# Patient Record
Sex: MALE | Race: WHITE | NOT HISPANIC OR LATINO | Employment: FULL TIME | URBAN - METROPOLITAN AREA
[De-identification: names, ages, dates, MRNs, and addresses within clinical notes are randomized per-mention and may not be internally consistent; named-entity substitution may affect disease eponyms.]

---

## 2019-08-13 ENCOUNTER — OFFICE VISIT (OUTPATIENT)
Dept: FAMILY MEDICINE CLINIC | Facility: CLINIC | Age: 64
End: 2019-08-13

## 2019-08-13 VITALS
HEART RATE: 105 BPM | SYSTOLIC BLOOD PRESSURE: 183 MMHG | WEIGHT: 205.4 LBS | RESPIRATION RATE: 12 BRPM | DIASTOLIC BLOOD PRESSURE: 128 MMHG

## 2019-08-13 DIAGNOSIS — R06.01 ORTHOPNEA: ICD-10-CM

## 2019-08-13 DIAGNOSIS — Z00.00 PHYSICAL EXAM: Primary | ICD-10-CM

## 2019-08-13 DIAGNOSIS — Z12.11 COLON CANCER SCREENING: ICD-10-CM

## 2019-08-13 PROBLEM — I10 ESSENTIAL HYPERTENSION: Status: ACTIVE | Noted: 2019-08-13

## 2019-08-13 PROCEDURE — 99396 PREV VISIT EST AGE 40-64: CPT | Performed by: FAMILY MEDICINE

## 2019-08-13 RX ORDER — LISINOPRIL 10 MG/1
10 TABLET ORAL DAILY
Qty: 30 TABLET | Refills: 5 | Status: SHIPPED | OUTPATIENT
Start: 2019-08-13 | End: 2019-08-27

## 2019-08-13 NOTE — PATIENT INSTRUCTIONS

## 2019-08-13 NOTE — PROGRESS NOTES
Rooming Tab(CC,VS,Pt Hx,Fall Screen)  Chief Complaint   Patient presents with   • Annual Exam     The patient is here for a new patient apt.       Subjective 64-year-old male here for a physical.  He is a new patient.  Patient does not exercise.  Patient denies any chest pain or dyspnea with exertion.  He has no dizziness or syncope.  The patient does complain of orthopnea and PND.  This is been going on for a couple of weeks.  He has no cough but does feel somewhat wheezy at times.  This only happens at night when he lays down.  He has no fever or chills.  He has no runny nose or congested nose and no history of lung disease and does not smoke.  He has no shortness of breath with walking and no history of congestive heart failure.  He does have untreated hypertension and is had this for years.  He denies any issues with bowel or bladder function.  He has no blood in his stool or blood in his urine.  He tries to eat right.    I have reviewed and updated his medications, medical history and problem list during today's office visit.     Patient Care Team:  Zev Li MD as PCP - General (Family Medicine)    Problem List Tab  Medications Tab  Synopsis Tab  Chart Review Tab  Care Everywhere Tab  Immunizations Tab  Patient History Tab    Social History     Tobacco Use   • Smoking status: Never Smoker   • Smokeless tobacco: Never Used   Substance Use Topics   • Alcohol use: No     Frequency: Never     ECG 12 Lead  Date/Time: 8/13/2019 2:30 PM  Performed by: Zev Li MD  Authorized by: Zev Li MD   Comparison: not compared with previous ECG   Previous ECG: no previous ECG available  Rhythm: sinus rhythm  Rate: normal  Conduction: left bundle branch block  QRS axis: left    Clinical impression: abnormal EKG  Comments: Will jahaira echo              Review of Systems   Constitutional: Negative for chills, diaphoresis, fatigue and fever.   HENT: Negative for nosebleeds.    Respiratory: Negative for chest  tightness and shortness of breath.         Orthopnea   Cardiovascular: Negative for chest pain and palpitations.   Gastrointestinal: Negative for abdominal pain and blood in stool.   Genitourinary: Negative for dysuria and hematuria.   Musculoskeletal: Negative for arthralgias.   Neurological: Negative for dizziness and syncope.   Psychiatric/Behavioral: Negative for depressed mood. The patient is not nervous/anxious.        Objective     Rooming Tab(CC,VS,Pt Hx,Fall Screen)  BP (!) 183/128 (BP Location: Left arm, Patient Position: Sitting, Cuff Size: Large Adult)   Pulse 105   Resp 12   Wt 93.2 kg (205 lb 6.4 oz)     There is no height or weight on file to calculate BMI.    Physical Exam   Constitutional: He is oriented to person, place, and time. He appears well-developed and well-nourished. He is cooperative.   HENT:   Head: Normocephalic and atraumatic.   Nose: Nose normal.   Mouth/Throat: Oropharynx is clear and moist.   Eyes: Conjunctivae and EOM are normal. Pupils are equal, round, and reactive to light.   Neck: Trachea normal and normal range of motion. Neck supple. No hepatojugular reflux and no JVD present. Carotid bruit is not present. No thyroid mass and no thyromegaly present.   No carotid bruits   Cardiovascular: Normal pulses.   No murmur heard.  Regular rate and rhythm with ectopy   Pulmonary/Chest: Effort normal and breath sounds normal. He exhibits no tenderness.   Abdominal: Soft. Normal appearance and bowel sounds are normal. He exhibits no mass. There is no hepatosplenomegaly. There is no tenderness. There is no guarding.   Genitourinary: Rectum normal.   Genitourinary Comments: Prostate mildly enlarged   Musculoskeletal: Normal range of motion. He exhibits no edema.   Neurological: He is alert and oriented to person, place, and time. He has normal strength and normal reflexes. No cranial nerve deficit or sensory deficit. He displays a negative Romberg sign.   Skin: Skin is warm and dry.  Turgor is normal.   Psychiatric: He has a normal mood and affect. His speech is normal and behavior is normal. Judgment and thought content normal. Cognition and memory are normal.   Nursing note and vitals reviewed.       Statin Choice Calculator  Data Reviewed:                   Assessment/Plan   Order Review Tab  Health Maintenance Tab  Patient Plan/Order Tab  Diagnoses and all orders for this visit:    1. Physical exam (Primary)  Assessment & Plan:  Weight loss  Diet  Weight loss  immunizations discussed    Orders:  -     Urinalysis With Microscopic - Urine, Clean Catch; Future  -     PSA Screen; Future  -     TSH; Future  -     Lipid Panel; Future  -     CBC & Differential; Future  -     Comprehensive Metabolic Panel; Future  -     ECG 12 Lead    2. Colon cancer screening  -     Ambulatory Referral For Screening Colonoscopy    3. Orthopnea  Assessment & Plan:  Chest x-ray PA and lateral, EKG  After review of his EKG he will need an echo.    Orders:  -     XR Chest PA & Lateral; Future    Other orders  -     lisinopril (PRINIVIL,ZESTRIL) 10 MG tablet; Take 1 tablet by mouth Daily.  Dispense: 30 tablet; Refill: 5      Wrapup Tab  Return in about 2 weeks (around 8/27/2019) for Recheck.

## 2019-08-14 DIAGNOSIS — I11.9 HYPERTENSIVE HEART DISEASE, UNSPECIFIED WHETHER HEART FAILURE PRESENT: Primary | ICD-10-CM

## 2019-08-14 DIAGNOSIS — I44.7 BUNDLE BRANCH BLOCK, LEFT: ICD-10-CM

## 2019-08-15 ENCOUNTER — LAB (OUTPATIENT)
Dept: FAMILY MEDICINE CLINIC | Facility: CLINIC | Age: 64
End: 2019-08-15

## 2019-08-15 DIAGNOSIS — Z00.00 PHYSICAL EXAM: ICD-10-CM

## 2019-08-15 DIAGNOSIS — R06.01 ORTHOPNEA: ICD-10-CM

## 2019-08-15 LAB
ALBUMIN SERPL-MCNC: 3.6 G/DL (ref 3.5–4.8)
ALBUMIN/GLOB SERPL: 1.5 G/DL (ref 1–1.7)
ALP SERPL-CCNC: 50 U/L (ref 32–91)
ALT SERPL W P-5'-P-CCNC: 30 U/L (ref 17–63)
ANION GAP SERPL CALCULATED.3IONS-SCNC: 13.6 MMOL/L (ref 5–15)
ARTICHOKE IGE QN: 144 MG/DL (ref 0–100)
AST SERPL-CCNC: 23 U/L (ref 15–41)
BACTERIA UR QL AUTO: ABNORMAL /HPF
BASOPHILS # BLD AUTO: 0 10*3/MM3 (ref 0–0.2)
BASOPHILS NFR BLD AUTO: 0.8 % (ref 0–1.5)
BILIRUB SERPL-MCNC: 1.2 MG/DL (ref 0.3–1.2)
BILIRUB UR QL STRIP: NEGATIVE
BUN BLD-MCNC: 13 MG/DL (ref 8–20)
BUN/CREAT SERPL: 10 (ref 6.2–20.3)
CALCIUM SPEC-SCNC: 8.8 MG/DL (ref 8.9–10.3)
CHLORIDE SERPL-SCNC: 107 MMOL/L (ref 101–111)
CHOLEST SERPL-MCNC: 182 MG/DL
CLARITY UR: CLEAR
CO2 SERPL-SCNC: 25 MMOL/L (ref 22–32)
COLOR UR: YELLOW
CREAT BLD-MCNC: 1.3 MG/DL (ref 0.7–1.2)
DEPRECATED RDW RBC AUTO: 45.1 FL (ref 37–54)
EOSINOPHIL # BLD AUTO: 0.1 10*3/MM3 (ref 0–0.4)
EOSINOPHIL NFR BLD AUTO: 2.1 % (ref 0.3–6.2)
ERYTHROCYTE [DISTWIDTH] IN BLOOD BY AUTOMATED COUNT: 14.3 % (ref 12.3–15.4)
GFR SERPL CREATININE-BSD FRML MDRD: 56 ML/MIN/1.73
GIANT PLATELETS: NORMAL
GLOBULIN UR ELPH-MCNC: 2.4 GM/DL (ref 2.5–3.8)
GLUCOSE BLD-MCNC: 97 MG/DL (ref 65–99)
GLUCOSE UR STRIP-MCNC: NEGATIVE MG/DL
HCT VFR BLD AUTO: 42.7 % (ref 37.5–51)
HDLC SERPL QL: 4.79
HDLC SERPL-MCNC: 38 MG/DL
HGB BLD-MCNC: 14.3 G/DL (ref 13–17.7)
HGB UR QL STRIP.AUTO: NEGATIVE
HYALINE CASTS UR QL AUTO: ABNORMAL /LPF
KETONES UR QL STRIP: NEGATIVE
LDLC/HDLC SERPL: 3.35 {RATIO}
LEUKOCYTE ESTERASE UR QL STRIP.AUTO: NEGATIVE
LYMPHOCYTES # BLD AUTO: 1.7 10*3/MM3 (ref 0.7–3.1)
LYMPHOCYTES NFR BLD AUTO: 29.1 % (ref 19.6–45.3)
MCH RBC QN AUTO: 30.3 PG (ref 26.6–33)
MCHC RBC AUTO-ENTMCNC: 33.6 G/DL (ref 31.5–35.7)
MCV RBC AUTO: 90.3 FL (ref 79–97)
MONOCYTES # BLD AUTO: 0.6 10*3/MM3 (ref 0.1–0.9)
MONOCYTES NFR BLD AUTO: 9.9 % (ref 5–12)
NEUTROPHILS # BLD AUTO: 3.4 10*3/MM3 (ref 1.7–7)
NEUTROPHILS NFR BLD AUTO: 58.1 % (ref 42.7–76)
NITRITE UR QL STRIP: NEGATIVE
NRBC BLD AUTO-RTO: 0.1 /100 WBC (ref 0–0.2)
PH UR STRIP.AUTO: 6 [PH] (ref 5–8)
PLATELET # BLD AUTO: 117 10*3/MM3 (ref 140–450)
PMV BLD AUTO: 11.6 FL (ref 6–12)
POTASSIUM BLD-SCNC: 4.6 MMOL/L (ref 3.6–5.1)
PROT SERPL-MCNC: 6 G/DL (ref 6.1–7.9)
PROT UR QL STRIP: NEGATIVE
PSA SERPL-MCNC: 0.52 NG/ML (ref 0–4)
RBC # BLD AUTO: 4.73 10*6/MM3 (ref 4.14–5.8)
RBC # UR: ABNORMAL /HPF
RBC MORPH BLD: NORMAL
REF LAB TEST METHOD: ABNORMAL
SMALL PLATELETS BLD QL SMEAR: ADEQUATE
SODIUM BLD-SCNC: 141 MMOL/L (ref 136–144)
SP GR UR STRIP: 1.01 (ref 1–1.03)
SQUAMOUS #/AREA URNS HPF: ABNORMAL /HPF
TRIGL SERPL-MCNC: 84 MG/DL
TSH SERPL DL<=0.05 MIU/L-ACNC: 2.13 MIU/ML (ref 0.34–5.6)
UROBILINOGEN UR QL STRIP: NORMAL
VLDLC SERPL-MCNC: 16.8 MG/DL
WBC MORPH BLD: NORMAL
WBC NRBC COR # BLD: 5.8 10*3/MM3 (ref 3.4–10.8)
WBC UR QL AUTO: ABNORMAL /HPF

## 2019-08-15 PROCEDURE — G0103 PSA SCREENING: HCPCS | Performed by: FAMILY MEDICINE

## 2019-08-15 PROCEDURE — 80061 LIPID PANEL: CPT | Performed by: FAMILY MEDICINE

## 2019-08-15 PROCEDURE — 85007 BL SMEAR W/DIFF WBC COUNT: CPT | Performed by: FAMILY MEDICINE

## 2019-08-15 PROCEDURE — 80053 COMPREHEN METABOLIC PANEL: CPT | Performed by: FAMILY MEDICINE

## 2019-08-15 PROCEDURE — 84443 ASSAY THYROID STIM HORMONE: CPT | Performed by: FAMILY MEDICINE

## 2019-08-15 PROCEDURE — 81001 URINALYSIS AUTO W/SCOPE: CPT | Performed by: FAMILY MEDICINE

## 2019-08-15 PROCEDURE — 85025 COMPLETE CBC W/AUTO DIFF WBC: CPT | Performed by: FAMILY MEDICINE

## 2019-08-16 ENCOUNTER — TELEPHONE (OUTPATIENT)
Dept: FAMILY MEDICINE CLINIC | Facility: CLINIC | Age: 64
End: 2019-08-16

## 2019-08-22 ENCOUNTER — HOSPITAL ENCOUNTER (OUTPATIENT)
Dept: CARDIOLOGY | Facility: HOSPITAL | Age: 64
Discharge: HOME | End: 2019-08-22
Admitting: FAMILY MEDICINE

## 2019-08-22 VITALS
SYSTOLIC BLOOD PRESSURE: 158 MMHG | BODY MASS INDEX: 28.63 KG/M2 | HEIGHT: 70 IN | DIASTOLIC BLOOD PRESSURE: 100 MMHG | WEIGHT: 200 LBS

## 2019-08-22 DIAGNOSIS — I11.9 HYPERTENSIVE HEART DISEASE, UNSPECIFIED WHETHER HEART FAILURE PRESENT: ICD-10-CM

## 2019-08-22 DIAGNOSIS — I44.7 BUNDLE BRANCH BLOCK, LEFT: ICD-10-CM

## 2019-08-22 LAB
BH CV ECHO MEAS - ACS: 2.3 CM
BH CV ECHO MEAS - AI DEC SLOPE: 250 CM/SEC^2
BH CV ECHO MEAS - AI DEC TIME: 2 SEC
BH CV ECHO MEAS - AI MAX PG: 97.8 MMHG
BH CV ECHO MEAS - AI MAX VEL: 494.4 CM/SEC
BH CV ECHO MEAS - AI P1/2T: 579.3 MSEC
BH CV ECHO MEAS - AO MAX PG (FULL): 3.2 MMHG
BH CV ECHO MEAS - AO MAX PG: 6.5 MMHG
BH CV ECHO MEAS - AO MEAN PG (FULL): 1.6 MMHG
BH CV ECHO MEAS - AO MEAN PG: 3.4 MMHG
BH CV ECHO MEAS - AO ROOT AREA (BSA CORRECTED): 1.8
BH CV ECHO MEAS - AO ROOT AREA: 11.1 CM^2
BH CV ECHO MEAS - AO ROOT DIAM: 3.8 CM
BH CV ECHO MEAS - AO V2 MAX: 127.6 CM/SEC
BH CV ECHO MEAS - AO V2 MEAN: 86.2 CM/SEC
BH CV ECHO MEAS - AO V2 VTI: 19.9 CM
BH CV ECHO MEAS - AORTIC HR: 73.8 BPM
BH CV ECHO MEAS - AORTIC R-R: 0.81 SEC
BH CV ECHO MEAS - AVA(I,A): 3.4 CM^2
BH CV ECHO MEAS - AVA(I,D): 3.4 CM^2
BH CV ECHO MEAS - AVA(V,A): 3.1 CM^2
BH CV ECHO MEAS - AVA(V,D): 3.1 CM^2
BH CV ECHO MEAS - BSA(HAYCOCK): 2.1 M^2
BH CV ECHO MEAS - BSA: 2.1 M^2
BH CV ECHO MEAS - BZI_BMI: 28.7 KILOGRAMS/M^2
BH CV ECHO MEAS - BZI_METRIC_HEIGHT: 177.8 CM
BH CV ECHO MEAS - BZI_METRIC_WEIGHT: 90.7 KG
BH CV ECHO MEAS - CI(AO): 7.8 L/MIN/M^2
BH CV ECHO MEAS - CI(LVOT): 2.4 L/MIN/M^2
BH CV ECHO MEAS - CO(AO): 16.3 L/MIN
BH CV ECHO MEAS - CO(LVOT): 5 L/MIN
BH CV ECHO MEAS - EDV(CUBED): 246.8 ML
BH CV ECHO MEAS - EDV(MOD-SP4): 255.8 ML
BH CV ECHO MEAS - EDV(TEICH): 199.2 ML
BH CV ECHO MEAS - EF(CUBED): 35.3 %
BH CV ECHO MEAS - EF(MOD-BP): 20 %
BH CV ECHO MEAS - EF(MOD-SP4): 20.1 %
BH CV ECHO MEAS - EF(TEICH): 28.3 %
BH CV ECHO MEAS - ESV(CUBED): 159.7 ML
BH CV ECHO MEAS - ESV(MOD-SP4): 204.3 ML
BH CV ECHO MEAS - ESV(TEICH): 142.9 ML
BH CV ECHO MEAS - FS: 13.5 %
BH CV ECHO MEAS - IVS/LVPW: 1
BH CV ECHO MEAS - IVSD: 1.4 CM
BH CV ECHO MEAS - LA DIMENSION(2D): 5 CM
BH CV ECHO MEAS - LV DIASTOLIC VOL/BSA (35-75): 122.5 ML/M^2
BH CV ECHO MEAS - LV MASS(C)D: 414.1 GRAMS
BH CV ECHO MEAS - LV MASS(C)DI: 198.4 GRAMS/M^2
BH CV ECHO MEAS - LV MAX PG: 3.3 MMHG
BH CV ECHO MEAS - LV MEAN PG: 1.8 MMHG
BH CV ECHO MEAS - LV SYSTOLIC VOL/BSA (12-30): 97.9 ML/M^2
BH CV ECHO MEAS - LV V1 MAX: 91 CM/SEC
BH CV ECHO MEAS - LV V1 MEAN: 61.1 CM/SEC
BH CV ECHO MEAS - LV V1 VTI: 15.7 CM
BH CV ECHO MEAS - LVIDD: 6.3 CM
BH CV ECHO MEAS - LVIDS: 5.4 CM
BH CV ECHO MEAS - LVOT AREA: 4.3 CM^2
BH CV ECHO MEAS - LVOT DIAM: 2.3 CM
BH CV ECHO MEAS - LVPWD: 1.4 CM
BH CV ECHO MEAS - MV A MAX VEL: 103.2 CM/SEC
BH CV ECHO MEAS - MV DEC SLOPE: 370.6 CM/SEC^2
BH CV ECHO MEAS - MV DEC TIME: 0.25 SEC
BH CV ECHO MEAS - MV E MAX VEL: 93.2 CM/SEC
BH CV ECHO MEAS - MV E/A: 0.9
BH CV ECHO MEAS - MV MAX PG: 4.9 MMHG
BH CV ECHO MEAS - MV MEAN PG: 3.4 MMHG
BH CV ECHO MEAS - MV V2 MAX: 110.8 CM/SEC
BH CV ECHO MEAS - MV V2 MEAN: 88.3 CM/SEC
BH CV ECHO MEAS - MV V2 VTI: 25.5 CM
BH CV ECHO MEAS - MVA(VTI): 2.6 CM^2
BH CV ECHO MEAS - PA MAX PG (FULL): 0.43 MMHG
BH CV ECHO MEAS - PA MAX PG: 1.4 MMHG
BH CV ECHO MEAS - PA V2 MAX: 59.8 CM/SEC
BH CV ECHO MEAS - PI END-D VEL: 180.1 CM/SEC
BH CV ECHO MEAS - PI MAX PG: 23.6 MMHG
BH CV ECHO MEAS - PI MAX VEL: 243.2 CM/SEC
BH CV ECHO MEAS - PULM A REVS DUR: 0.11 SEC
BH CV ECHO MEAS - PULM A REVS VEL: 22.5 CM/SEC
BH CV ECHO MEAS - PULM DIAS VEL: 33.2 CM/SEC
BH CV ECHO MEAS - PULM S/D: 1.6
BH CV ECHO MEAS - PULM SYS VEL: 54.3 CM/SEC
BH CV ECHO MEAS - PVA(V,A): 7.1 CM^2
BH CV ECHO MEAS - PVA(V,D): 7.1 CM^2
BH CV ECHO MEAS - QP/QS: 1.2
BH CV ECHO MEAS - RV MAX PG: 1 MMHG
BH CV ECHO MEAS - RV MEAN PG: 0.54 MMHG
BH CV ECHO MEAS - RV V1 MAX: 50 CM/SEC
BH CV ECHO MEAS - RV V1 MEAN: 35.2 CM/SEC
BH CV ECHO MEAS - RV V1 VTI: 9.7 CM
BH CV ECHO MEAS - RVDD: 3.6 CM
BH CV ECHO MEAS - RVOT AREA: 8.4 CM^2
BH CV ECHO MEAS - RVOT DIAM: 3.3 CM
BH CV ECHO MEAS - SI(AO): 106 ML/M^2
BH CV ECHO MEAS - SI(CUBED): 41.7 ML/M^2
BH CV ECHO MEAS - SI(LVOT): 32.3 ML/M^2
BH CV ECHO MEAS - SI(MOD-SP4): 24.7 ML/M^2
BH CV ECHO MEAS - SI(TEICH): 27 ML/M^2
BH CV ECHO MEAS - SV(AO): 221.3 ML
BH CV ECHO MEAS - SV(CUBED): 87 ML
BH CV ECHO MEAS - SV(LVOT): 67.4 ML
BH CV ECHO MEAS - SV(MOD-SP4): 51.5 ML
BH CV ECHO MEAS - SV(RVOT): 81.4 ML
BH CV ECHO MEAS - SV(TEICH): 56.3 ML
BH CV ECHO MEAS - TR MAX VEL: 191.1 CM/SEC
LV EF 2D ECHO EST: 20 %

## 2019-08-22 PROCEDURE — 93306 TTE W/DOPPLER COMPLETE: CPT

## 2019-08-22 PROCEDURE — 93306 TTE W/DOPPLER COMPLETE: CPT | Performed by: INTERNAL MEDICINE

## 2019-08-26 ENCOUNTER — OFFICE VISIT (OUTPATIENT)
Dept: CARDIOLOGY | Facility: CLINIC | Age: 64
End: 2019-08-26

## 2019-08-26 ENCOUNTER — TELEPHONE (OUTPATIENT)
Dept: FAMILY MEDICINE CLINIC | Facility: CLINIC | Age: 64
End: 2019-08-26

## 2019-08-26 ENCOUNTER — TELEPHONE (OUTPATIENT)
Dept: CARDIOLOGY | Facility: CLINIC | Age: 64
End: 2019-08-26

## 2019-08-26 VITALS
WEIGHT: 195 LBS | BODY MASS INDEX: 27.92 KG/M2 | OXYGEN SATURATION: 98 % | RESPIRATION RATE: 18 BRPM | HEIGHT: 70 IN | DIASTOLIC BLOOD PRESSURE: 104 MMHG | SYSTOLIC BLOOD PRESSURE: 173 MMHG | HEART RATE: 69 BPM

## 2019-08-26 DIAGNOSIS — I10 ESSENTIAL HYPERTENSION: ICD-10-CM

## 2019-08-26 DIAGNOSIS — I50.21 ACUTE SYSTOLIC CONGESTIVE HEART FAILURE (HCC): ICD-10-CM

## 2019-08-26 DIAGNOSIS — I42.0 DILATED CARDIOMYOPATHY (HCC): Primary | ICD-10-CM

## 2019-08-26 PROCEDURE — 99203 OFFICE O/P NEW LOW 30 MIN: CPT | Performed by: INTERNAL MEDICINE

## 2019-08-26 RX ORDER — SPIRONOLACTONE 25 MG/1
12.5 TABLET ORAL DAILY
Qty: 30 TABLET | Refills: 2 | Status: SHIPPED | OUTPATIENT
Start: 2019-08-26 | End: 2019-08-27

## 2019-08-26 NOTE — PROGRESS NOTES
Subjective:     Encounter Date:08/26/2019      Patient ID: Devaughn Valdez is a 64 y.o. male.    Chief Complaint:      Dear ,  It is my pleasure seeing patient Devaughn Valdez in the office today.  He is a pleasant 64-year-old male that was recently diagnosed with hypertension and also cardiomegaly presented with symptoms of orthopnea and PND  Patient denies any prior cardiac history  No recent cardiac evaluation or work-up  Presesented with symptoms of PND and orthopnea  He had labs and EKG and chest x-ray done so for that were reviewed with the patient  Chest x-ray with evidence of cardiomegaly  EKG with left bundle branch block  Patient has severe cardiomyopathy on echocardiogram with LV ejection fraction of 20%  Moderate to severe mitral regurgitation  Plan to arrange for cardiac catheterization left and right to further evaluate the cardiomyopathy and mitral regurgitation  Arrange for a wearable defibrillator  Referral for a sleep study  Start patient on beta-blocker and spironolactone  Close monitoring of renal function and potassium  Differential diagnosis and treatment options discussed the patient and family at length  Low-salt diet  Follow-up in office in 1 month            The following portions of the patient's history were reviewed and updated as appropriate: allergies, current medications, past family history, past medical history, past social history, past surgical history and problem list.    No Known Allergies      Current Outpatient Medications:   •  lisinopril (PRINIVIL,ZESTRIL) 10 MG tablet, Take 1 tablet by mouth Daily. (Patient taking differently: Take 10 mg by mouth 2 (Two) Times a Day.), Disp: 30 tablet, Rfl: 5    Family History   Problem Relation Age of Onset   • Cancer Mother    • Heart attack Father        Past Medical History:   Diagnosis Date   • Hypertension        History reviewed. No pertinent surgical history.    Social History     Socioeconomic History   • Marital status:       Spouse name: Not on file   • Number of children: Not on file   • Years of education: Not on file   • Highest education level: Not on file   Tobacco Use   • Smoking status: Never Smoker   • Smokeless tobacco: Never Used   Substance and Sexual Activity   • Alcohol use: No     Frequency: Never   • Drug use: No       Review of Systems   Constitution: Negative for chills, decreased appetite and malaise/fatigue.   HENT: Negative for congestion.    Eyes: Negative for blurred vision and double vision.   Cardiovascular: Positive for dyspnea on exertion, orthopnea and paroxysmal nocturnal dyspnea. Negative for chest pain, irregular heartbeat, leg swelling, near-syncope, palpitations and syncope.   Respiratory: Negative for cough and shortness of breath.    Hematologic/Lymphatic: Negative for adenopathy. Does not bruise/bleed easily.   Skin: Negative for dry skin, flushing, nail changes and rash.   Musculoskeletal: Negative for arthritis, back pain, gout and joint pain.   Gastrointestinal: Negative for bloating and abdominal pain.   Genitourinary: Negative for bladder incontinence, dysuria, flank pain and frequency.   Neurological: Negative for dizziness and focal weakness.   Psychiatric/Behavioral: Negative for altered mental status, hallucinations and hypervigilance. The patient does not have insomnia.             Objective:         Vitals:    08/26/19 1258   BP: (!) 173/104   Pulse: 69   Resp: 18   SpO2: 98%       Physical Exam   Constitutional: He is oriented to person, place, and time. He appears well-developed and well-nourished.   HENT:   Head: Normocephalic and atraumatic.   Eyes: Conjunctivae are normal. Pupils are equal, round, and reactive to light.   Neck: Normal range of motion. Neck supple. No thyromegaly present.   Cardiovascular: Normal rate, regular rhythm, S1 normal, S2 normal and intact distal pulses.   Pulmonary/Chest: Effort normal and breath sounds normal.   Abdominal: Soft. Bowel sounds are  normal.   Musculoskeletal: He exhibits no edema.   Neurological: He is alert and oriented to person, place, and time.   Skin: Skin is warm.   Nursing note and vitals reviewed.      EKG and labs reviewed

## 2019-08-26 NOTE — TELEPHONE ENCOUNTER
L/M with Dr. Diaz office to inform them that the mutal Pt had an echo and our office wanted him seen to discuss the echo. The Pt wanted to make sure Dr. Diaz office was aware of the follow up with Dr. Hawkins.

## 2019-08-27 ENCOUNTER — OFFICE VISIT (OUTPATIENT)
Dept: FAMILY MEDICINE CLINIC | Facility: CLINIC | Age: 64
End: 2019-08-27

## 2019-08-27 VITALS
SYSTOLIC BLOOD PRESSURE: 180 MMHG | DIASTOLIC BLOOD PRESSURE: 102 MMHG | TEMPERATURE: 97.6 F | HEART RATE: 60 BPM | RESPIRATION RATE: 8 BRPM | BODY MASS INDEX: 28.12 KG/M2 | WEIGHT: 196 LBS

## 2019-08-27 DIAGNOSIS — I42.0 DILATED CARDIOMYOPATHY (HCC): ICD-10-CM

## 2019-08-27 DIAGNOSIS — I10 ESSENTIAL HYPERTENSION: Primary | ICD-10-CM

## 2019-08-27 PROCEDURE — 99213 OFFICE O/P EST LOW 20 MIN: CPT | Performed by: FAMILY MEDICINE

## 2019-08-27 RX ORDER — LISINOPRIL 40 MG/1
40 TABLET ORAL DAILY
Qty: 30 TABLET | Refills: 5 | Status: SHIPPED | OUTPATIENT
Start: 2019-08-27 | End: 2019-09-18 | Stop reason: HOSPADM

## 2019-08-27 RX ORDER — SPIRONOLACTONE 25 MG/1
25 TABLET ORAL DAILY
Qty: 30 TABLET | Refills: 2 | COMMUNITY
Start: 2019-08-27 | End: 2019-09-12 | Stop reason: SDUPTHER

## 2019-08-27 NOTE — PROGRESS NOTES
Rooming Tab(CC,VS,Pt Hx,Fall Screen)  Chief Complaint   Patient presents with   • Follow-up     2 week       Subjective 64-year-old here for follow-up of his cardiomyopathy.  He had an echocardiogram done with noted an ejection fraction of 20%.  He has seen Dr. Hawkins and is to undergo a cardiac catheterization which I agree with.  Patient has no further complaints of orthopnea or PND.  He has no ARRIETA.  He has no chest pain with exertion.  He is tolerating his lisinopril and had spironolactone as well as metoprolol added.  He is doing okay otherwise.  I have reviewed and updated his medications, medical history and problem list during today's office visit.     Patient Care Team:  Zev Li MD as PCP - General (Family Medicine)    Problem List Tab  Medications Tab  Synopsis Tab  Chart Review Tab  Care Everywhere Tab  Immunizations Tab  Patient History Tab    Social History     Tobacco Use   • Smoking status: Never Smoker   • Smokeless tobacco: Never Used   Substance Use Topics   • Alcohol use: No     Frequency: Never       Review of Systems   Constitutional: Negative for chills, fatigue and fever.   HENT: Negative for nosebleeds.    Eyes: Negative for double vision.   Respiratory: Negative for chest tightness and shortness of breath.    Cardiovascular: Negative for chest pain and palpitations.   Gastrointestinal: Negative for blood in stool.   Genitourinary: Negative for dysuria and hematuria.   Neurological: Negative for dizziness and syncope.   Psychiatric/Behavioral: Negative for depressed mood.       Objective     Rooming Tab(CC,VS,Pt Hx,Fall Screen)  BP (!) 180/102 (BP Location: Right arm, Patient Position: Sitting, Cuff Size: Large Adult)   Pulse 60   Temp 97.6 °F (36.4 °C) (Oral)   Resp 8   Wt 88.9 kg (196 lb)   BMI 28.12 kg/m²     Body mass index is 28.12 kg/m².    Physical Exam   Constitutional: He is oriented to person, place, and time. He appears well-developed and well-nourished. No distress.    HENT:   Head: Normocephalic and atraumatic.   Nose: Nose normal.   Mouth/Throat: Oropharynx is clear and moist.   Eyes: Conjunctivae, EOM and lids are normal. Pupils are equal, round, and reactive to light.   Neck: Trachea normal and normal range of motion. Neck supple. No JVD present. Carotid bruit is not present. No thyroid mass and no thyromegaly present.   No carotid bruits   Cardiovascular: Normal rate, regular rhythm, normal heart sounds and intact distal pulses.   Pulmonary/Chest: Effort normal and breath sounds normal.   Musculoskeletal:   No c/c/e   Neurological: He is alert and oriented to person, place, and time. No cranial nerve deficit.   Skin: Skin is warm and dry.   Psychiatric: He has a normal mood and affect. His speech is normal and behavior is normal. He is attentive.   Nursing note and vitals reviewed.       Statin Choice Calculator  Data Reviewed:               Lab Results   Component Value Date    BUN 13 08/15/2019    CREATININE 1.30 (H) 08/15/2019    EGFRIFNONA 56 (L) 08/15/2019     08/15/2019    K 4.6 08/15/2019     08/15/2019    CALCIUM 8.8 (L) 08/15/2019    ALBUMIN 3.60 08/15/2019    BILITOT 1.2 08/15/2019    ALKPHOS 50 08/15/2019    AST 23 08/15/2019    ALT 30 08/15/2019    TRIG 84 08/15/2019    HDL 38 (L) 08/15/2019    VLDL 16.8 08/15/2019     (H) 08/15/2019    LDLHDL 3.35 08/15/2019    WBC 5.80 08/15/2019    RBC 4.73 08/15/2019    HCT 42.7 08/15/2019    MCV 90.3 08/15/2019    MCH 30.3 08/15/2019    TSH 2.130 08/15/2019    PSA 0.520 08/15/2019      Assessment/Plan   Order Review Tab  Health Maintenance Tab  Patient Plan/Order Tab  Diagnoses and all orders for this visit:    1. Essential hypertension (Primary)  Assessment & Plan:  Hypertension is improving with treatment.  Dietary sodium restriction.  Medication changes per orders.  Blood pressure will be reassessed in 4 weeks.  Increase lisinopril to 40 mg daily    Orders:  -     Basic Metabolic Panel; Future    2.  Dilated cardiomyopathy (CMS/HCC)  Assessment & Plan:  Congestive heart failure due to hypertension.  Heart failure is unchanged.  NYHA Class III.  Dietary sodium restriction.  Heart failure will be reassessed in 1 month.  Increase spironolactone to 25 mg daily      Other orders  -     lisinopril (PRINIVIL,ZESTRIL) 40 MG tablet; Take 1 tablet by mouth Daily.  Dispense: 30 tablet; Refill: 5  -     spironolactone (ALDACTONE) 25 MG tablet; Take 1 tablet by mouth Daily.  Dispense: 30 tablet; Refill: 2      Wrapup Tab  Return in about 4 weeks (around 9/24/2019) for Recheck.

## 2019-08-27 NOTE — ASSESSMENT & PLAN NOTE
Congestive heart failure due to hypertension.  Heart failure is unchanged.  NYHA Class III.  Dietary sodium restriction.  Heart failure will be reassessed in 1 month.  Increase spironolactone to 25 mg daily

## 2019-08-27 NOTE — ASSESSMENT & PLAN NOTE
Hypertension is improving with treatment.  Dietary sodium restriction.  Medication changes per orders.  Blood pressure will be reassessed in 4 weeks.  Increase lisinopril to 40 mg daily

## 2019-09-04 ENCOUNTER — LAB (OUTPATIENT)
Dept: FAMILY MEDICINE CLINIC | Facility: CLINIC | Age: 64
End: 2019-09-04

## 2019-09-04 DIAGNOSIS — I10 ESSENTIAL HYPERTENSION: ICD-10-CM

## 2019-09-04 LAB
ANION GAP SERPL CALCULATED.3IONS-SCNC: 11.3 MMOL/L (ref 5–15)
BUN BLD-MCNC: 20 MG/DL (ref 8–20)
BUN/CREAT SERPL: 18.2 (ref 6.2–20.3)
CALCIUM SPEC-SCNC: 8.8 MG/DL (ref 8.9–10.3)
CHLORIDE SERPL-SCNC: 106 MMOL/L (ref 101–111)
CO2 SERPL-SCNC: 25 MMOL/L (ref 22–32)
CREAT BLD-MCNC: 1.1 MG/DL (ref 0.7–1.2)
GFR SERPL CREATININE-BSD FRML MDRD: 68 ML/MIN/1.73
GLUCOSE BLD-MCNC: 108 MG/DL (ref 65–99)
POTASSIUM BLD-SCNC: 4.3 MMOL/L (ref 3.6–5.1)
SODIUM BLD-SCNC: 138 MMOL/L (ref 136–144)

## 2019-09-04 PROCEDURE — 80048 BASIC METABOLIC PNL TOTAL CA: CPT | Performed by: FAMILY MEDICINE

## 2019-09-12 ENCOUNTER — TELEPHONE (OUTPATIENT)
Dept: FAMILY MEDICINE CLINIC | Facility: CLINIC | Age: 64
End: 2019-09-12

## 2019-09-12 RX ORDER — SPIRONOLACTONE 25 MG/1
25 TABLET ORAL DAILY
Qty: 30 TABLET | Refills: 6 | Status: ON HOLD | COMMUNITY
Start: 2019-09-12 | End: 2019-09-13

## 2019-09-12 NOTE — TELEPHONE ENCOUNTER
Needs an rx sent in for Spironolactone 25mg once daily.  Said Dr Hawkins changed him from half tablet daily to a whole tablet daily.

## 2019-09-13 ENCOUNTER — HOSPITAL ENCOUNTER (INPATIENT)
Facility: HOSPITAL | Age: 64
LOS: 5 days | Discharge: HOME | End: 2019-09-18
Attending: INTERNAL MEDICINE | Admitting: INTERNAL MEDICINE

## 2019-09-13 ENCOUNTER — LAB (OUTPATIENT)
Dept: LAB | Facility: HOSPITAL | Age: 64
End: 2019-09-13

## 2019-09-13 ENCOUNTER — ANESTHESIA (OUTPATIENT)
Dept: CARDIOLOGY | Facility: HOSPITAL | Age: 64
End: 2019-09-13

## 2019-09-13 ENCOUNTER — APPOINTMENT (OUTPATIENT)
Dept: ULTRASOUND IMAGING | Facility: HOSPITAL | Age: 64
End: 2019-09-13

## 2019-09-13 ENCOUNTER — ANESTHESIA EVENT (OUTPATIENT)
Dept: CARDIOLOGY | Facility: HOSPITAL | Age: 64
End: 2019-09-13

## 2019-09-13 ENCOUNTER — APPOINTMENT (OUTPATIENT)
Dept: CARDIOLOGY | Facility: HOSPITAL | Age: 64
End: 2019-09-13

## 2019-09-13 VITALS — OXYGEN SATURATION: 100 %

## 2019-09-13 DIAGNOSIS — I10 ESSENTIAL HYPERTENSION: ICD-10-CM

## 2019-09-13 DIAGNOSIS — I50.21 ACUTE SYSTOLIC CONGESTIVE HEART FAILURE (HCC): ICD-10-CM

## 2019-09-13 DIAGNOSIS — I42.0 DILATED CARDIOMYOPATHY (HCC): ICD-10-CM

## 2019-09-13 DIAGNOSIS — R06.01 ORTHOPNEA: Primary | ICD-10-CM

## 2019-09-13 LAB
ANION GAP SERPL CALCULATED.3IONS-SCNC: 13.6 MMOL/L (ref 5–15)
ANION GAP SERPL CALCULATED.3IONS-SCNC: 14 MMOL/L (ref 5–15)
APTT PPP: 25.8 SECONDS (ref 24–31)
ARTICHOKE IGE QN: 152 MG/DL (ref 0–100)
BASOPHILS # BLD AUTO: 0 10*3/MM3 (ref 0–0.2)
BASOPHILS NFR BLD AUTO: 0.3 % (ref 0–1.5)
BH CV ECHO MEAS - AI DEC SLOPE: 44.9 CM/SEC^2
BH CV ECHO MEAS - AI DEC TIME: 9.1 SEC
BH CV ECHO MEAS - AI MAX PG: 66.6 MMHG
BH CV ECHO MEAS - AI MAX VEL: 407.9 CM/SEC
BH CV ECHO MEAS - AI P1/2T: 2662 MSEC
BH CV ECHO MEAS - BSA(HAYCOCK): 2.1 M^2
BH CV ECHO MEAS - BSA: 2.1 M^2
BH CV ECHO MEAS - BZI_BMI: 28 KILOGRAMS/M^2
BH CV ECHO MEAS - BZI_METRIC_HEIGHT: 177.8 CM
BH CV ECHO MEAS - BZI_METRIC_WEIGHT: 88.5 KG
BH CV ECHO MEAS - EDV(MOD-SP4): 173.7 ML
BH CV ECHO MEAS - EF(MOD-SP4): 42.5 %
BH CV ECHO MEAS - ESV(MOD-SP4): 99.9 ML
BH CV ECHO MEAS - LV DIASTOLIC VOL/BSA (35-75): 84.1 ML/M^2
BH CV ECHO MEAS - LV SYSTOLIC VOL/BSA (12-30): 48.4 ML/M^2
BH CV ECHO MEAS - SI(MOD-SP4): 35.7 ML/M^2
BH CV ECHO MEAS - SV(MOD-SP4): 73.8 ML
BILIRUB UR QL STRIP: NEGATIVE
BUN BLD-MCNC: 17 MG/DL (ref 8–20)
BUN BLD-MCNC: 17 MG/DL (ref 8–20)
BUN/CREAT SERPL: 13.1 (ref 6.2–20.3)
BUN/CREAT SERPL: 13.1 (ref 6.2–20.3)
CALCIUM SPEC-SCNC: 8.8 MG/DL (ref 8.9–10.3)
CALCIUM SPEC-SCNC: 9.1 MG/DL (ref 8.9–10.3)
CHLORIDE SERPL-SCNC: 105 MMOL/L (ref 101–111)
CHLORIDE SERPL-SCNC: 105 MMOL/L (ref 101–111)
CHOLEST SERPL-MCNC: 209 MG/DL
CK SERPL-CCNC: 42 U/L (ref 49–397)
CLARITY UR: ABNORMAL
CO2 SERPL-SCNC: 25 MMOL/L (ref 22–32)
CO2 SERPL-SCNC: 27 MMOL/L (ref 22–32)
COLOR UR: YELLOW
CORTIS SERPL-MCNC: 18.93 MCG/DL
CREAT BLD-MCNC: 1.3 MG/DL (ref 0.7–1.2)
CREAT BLD-MCNC: 1.3 MG/DL (ref 0.7–1.2)
DEPRECATED RDW RBC AUTO: 42.9 FL (ref 37–54)
EOSINOPHIL # BLD AUTO: 0 10*3/MM3 (ref 0–0.4)
EOSINOPHIL NFR BLD AUTO: 0.2 % (ref 0.3–6.2)
EOSINOPHIL SPEC QL MICRO: 0 % EOS/100 CELLS (ref 0–0)
ERYTHROCYTE [DISTWIDTH] IN BLOOD BY AUTOMATED COUNT: 13.9 % (ref 12.3–15.4)
GFR SERPL CREATININE-BSD FRML MDRD: 56 ML/MIN/1.73
GFR SERPL CREATININE-BSD FRML MDRD: 56 ML/MIN/1.73
GLUCOSE BLD-MCNC: 102 MG/DL (ref 65–99)
GLUCOSE BLD-MCNC: 165 MG/DL (ref 65–99)
GLUCOSE UR STRIP-MCNC: NEGATIVE MG/DL
HCT VFR BLD AUTO: 43.6 % (ref 37.5–51)
HDLC SERPL QL: 5.81
HDLC SERPL-MCNC: 36 MG/DL
HGB BLD-MCNC: 14.6 G/DL (ref 13–17.7)
HGB UR QL STRIP.AUTO: NEGATIVE
INR PPP: 1.03 (ref 0.9–1.1)
KETONES UR QL STRIP: NEGATIVE
LDLC/HDLC SERPL: 3.74 {RATIO}
LEUKOCYTE ESTERASE UR QL STRIP.AUTO: NEGATIVE
LYMPHOCYTES # BLD AUTO: 1.1 10*3/MM3 (ref 0.7–3.1)
LYMPHOCYTES NFR BLD AUTO: 9.8 % (ref 19.6–45.3)
MCH RBC QN AUTO: 29.8 PG (ref 26.6–33)
MCHC RBC AUTO-ENTMCNC: 33.5 G/DL (ref 31.5–35.7)
MCV RBC AUTO: 88.9 FL (ref 79–97)
MONOCYTES # BLD AUTO: 0.6 10*3/MM3 (ref 0.1–0.9)
MONOCYTES NFR BLD AUTO: 5.6 % (ref 5–12)
NEUTROPHILS # BLD AUTO: 9.3 10*3/MM3 (ref 1.7–7)
NEUTROPHILS NFR BLD AUTO: 84.1 % (ref 42.7–76)
NITRITE UR QL STRIP: NEGATIVE
NRBC BLD AUTO-RTO: 0 /100 WBC (ref 0–0.2)
PH UR STRIP.AUTO: 5.5 [PH] (ref 5–8)
PLATELET # BLD AUTO: 93 10*3/MM3 (ref 140–450)
PMV BLD AUTO: 10.6 FL (ref 6–12)
POTASSIUM BLD-SCNC: 4 MMOL/L (ref 3.6–5.1)
POTASSIUM BLD-SCNC: 4.6 MMOL/L (ref 3.6–5.1)
PROT UR QL STRIP: NEGATIVE
PROTHROMBIN TIME: 10.5 SECONDS (ref 9.6–11.7)
PTH-INTACT SERPL-MCNC: 38 PG/ML (ref 11–72)
RBC # BLD AUTO: 4.91 10*6/MM3 (ref 4.14–5.8)
SODIUM BLD-SCNC: 140 MMOL/L (ref 136–144)
SODIUM BLD-SCNC: 141 MMOL/L (ref 136–144)
SODIUM UR-SCNC: 101 MMOL/L
SP GR UR STRIP: 1.02 (ref 1–1.03)
TRIGL SERPL-MCNC: 191 MG/DL
URATE SERPL-MCNC: 7.9 MG/DL (ref 4.8–8.7)
UROBILINOGEN UR QL STRIP: ABNORMAL
VLDLC SERPL-MCNC: 38.2 MG/DL
WBC NRBC COR # BLD: 11.1 10*3/MM3 (ref 3.4–10.8)

## 2019-09-13 PROCEDURE — 84155 ASSAY OF PROTEIN SERUM: CPT | Performed by: INTERNAL MEDICINE

## 2019-09-13 PROCEDURE — 76775 US EXAM ABDO BACK WALL LIM: CPT

## 2019-09-13 PROCEDURE — 99153 MOD SED SAME PHYS/QHP EA: CPT | Performed by: INTERNAL MEDICINE

## 2019-09-13 PROCEDURE — 99254 IP/OBS CNSLTJ NEW/EST MOD 60: CPT | Performed by: NURSE PRACTITIONER

## 2019-09-13 PROCEDURE — 93320 DOPPLER ECHO COMPLETE: CPT | Performed by: INTERNAL MEDICINE

## 2019-09-13 PROCEDURE — 93005 ELECTROCARDIOGRAM TRACING: CPT | Performed by: INTERNAL MEDICINE

## 2019-09-13 PROCEDURE — 82550 ASSAY OF CK (CPK): CPT | Performed by: INTERNAL MEDICINE

## 2019-09-13 PROCEDURE — 25010000002 MIDAZOLAM PER 1 MG: Performed by: INTERNAL MEDICINE

## 2019-09-13 PROCEDURE — 93312 ECHO TRANSESOPHAGEAL: CPT

## 2019-09-13 PROCEDURE — 80048 BASIC METABOLIC PNL TOTAL CA: CPT | Performed by: INTERNAL MEDICINE

## 2019-09-13 PROCEDURE — 83970 ASSAY OF PARATHORMONE: CPT | Performed by: INTERNAL MEDICINE

## 2019-09-13 PROCEDURE — 81003 URINALYSIS AUTO W/O SCOPE: CPT | Performed by: INTERNAL MEDICINE

## 2019-09-13 PROCEDURE — 82533 TOTAL CORTISOL: CPT | Performed by: INTERNAL MEDICINE

## 2019-09-13 PROCEDURE — C1769 GUIDE WIRE: HCPCS | Performed by: INTERNAL MEDICINE

## 2019-09-13 PROCEDURE — 93320 DOPPLER ECHO COMPLETE: CPT

## 2019-09-13 PROCEDURE — 85730 THROMBOPLASTIN TIME PARTIAL: CPT | Performed by: INTERNAL MEDICINE

## 2019-09-13 PROCEDURE — 85610 PROTHROMBIN TIME: CPT | Performed by: INTERNAL MEDICINE

## 2019-09-13 PROCEDURE — 25010000002 PROPOFOL 10 MG/ML EMULSION: Performed by: ANESTHESIOLOGY

## 2019-09-13 PROCEDURE — 93325 DOPPLER ECHO COLOR FLOW MAPG: CPT

## 2019-09-13 PROCEDURE — 80061 LIPID PANEL: CPT | Performed by: INTERNAL MEDICINE

## 2019-09-13 PROCEDURE — 84165 PROTEIN E-PHORESIS SERUM: CPT | Performed by: INTERNAL MEDICINE

## 2019-09-13 PROCEDURE — 93312 ECHO TRANSESOPHAGEAL: CPT | Performed by: INTERNAL MEDICINE

## 2019-09-13 PROCEDURE — 93325 DOPPLER ECHO COLOR FLOW MAPG: CPT | Performed by: INTERNAL MEDICINE

## 2019-09-13 PROCEDURE — 4A023N8 MEASUREMENT OF CARDIAC SAMPLING AND PRESSURE, BILATERAL, PERCUTANEOUS APPROACH: ICD-10-PCS | Performed by: INTERNAL MEDICINE

## 2019-09-13 PROCEDURE — B2111ZZ FLUOROSCOPY OF MULTIPLE CORONARY ARTERIES USING LOW OSMOLAR CONTRAST: ICD-10-PCS | Performed by: INTERNAL MEDICINE

## 2019-09-13 PROCEDURE — 99152 MOD SED SAME PHYS/QHP 5/>YRS: CPT | Performed by: INTERNAL MEDICINE

## 2019-09-13 PROCEDURE — 36415 COLL VENOUS BLD VENIPUNCTURE: CPT

## 2019-09-13 PROCEDURE — 87205 SMEAR GRAM STAIN: CPT | Performed by: INTERNAL MEDICINE

## 2019-09-13 PROCEDURE — 84550 ASSAY OF BLOOD/URIC ACID: CPT | Performed by: INTERNAL MEDICINE

## 2019-09-13 PROCEDURE — 93460 R&L HRT ART/VENTRICLE ANGIO: CPT | Performed by: INTERNAL MEDICINE

## 2019-09-13 PROCEDURE — C1894 INTRO/SHEATH, NON-LASER: HCPCS | Performed by: INTERNAL MEDICINE

## 2019-09-13 PROCEDURE — 0 IOPAMIDOL PER 1 ML: Performed by: INTERNAL MEDICINE

## 2019-09-13 PROCEDURE — 84300 ASSAY OF URINE SODIUM: CPT | Performed by: INTERNAL MEDICINE

## 2019-09-13 PROCEDURE — 80048 BASIC METABOLIC PNL TOTAL CA: CPT

## 2019-09-13 PROCEDURE — 99252 IP/OBS CONSLTJ NEW/EST SF 35: CPT | Performed by: INTERNAL MEDICINE

## 2019-09-13 PROCEDURE — 85025 COMPLETE CBC W/AUTO DIFF WBC: CPT | Performed by: INTERNAL MEDICINE

## 2019-09-13 PROCEDURE — 99253 IP/OBS CNSLTJ NEW/EST LOW 45: CPT | Performed by: NURSE PRACTITIONER

## 2019-09-13 PROCEDURE — 25010000002 FENTANYL CITRATE (PF) 100 MCG/2ML SOLUTION: Performed by: INTERNAL MEDICINE

## 2019-09-13 RX ORDER — FENTANYL CITRATE 50 UG/ML
INJECTION, SOLUTION INTRAMUSCULAR; INTRAVENOUS AS NEEDED
Status: DISCONTINUED | OUTPATIENT
Start: 2019-09-13 | End: 2019-09-13 | Stop reason: HOSPADM

## 2019-09-13 RX ORDER — LISINOPRIL 20 MG/1
40 TABLET ORAL DAILY
Status: DISCONTINUED | OUTPATIENT
Start: 2019-09-13 | End: 2019-09-13

## 2019-09-13 RX ORDER — HYDRALAZINE HYDROCHLORIDE 25 MG/1
50 TABLET, FILM COATED ORAL EVERY 12 HOURS SCHEDULED
Status: DISCONTINUED | OUTPATIENT
Start: 2019-09-13 | End: 2019-09-17

## 2019-09-13 RX ORDER — SODIUM CHLORIDE 9 MG/ML
50 INJECTION, SOLUTION INTRAVENOUS CONTINUOUS
Status: DISCONTINUED | OUTPATIENT
Start: 2019-09-13 | End: 2019-09-13

## 2019-09-13 RX ORDER — ASPIRIN 81 MG/1
81 TABLET ORAL DAILY
Status: DISCONTINUED | OUTPATIENT
Start: 2019-09-13 | End: 2019-09-18 | Stop reason: HOSPADM

## 2019-09-13 RX ORDER — ACETAMINOPHEN 325 MG/1
650 TABLET ORAL EVERY 4 HOURS PRN
Status: DISCONTINUED | OUTPATIENT
Start: 2019-09-13 | End: 2019-09-18 | Stop reason: HOSPADM

## 2019-09-13 RX ORDER — SODIUM CHLORIDE 9 MG/ML
250 INJECTION, SOLUTION INTRAVENOUS ONCE AS NEEDED
Status: DISCONTINUED | OUTPATIENT
Start: 2019-09-13 | End: 2019-09-13

## 2019-09-13 RX ORDER — LIDOCAINE HYDROCHLORIDE 20 MG/ML
INJECTION, SOLUTION INFILTRATION; PERINEURAL AS NEEDED
Status: DISCONTINUED | OUTPATIENT
Start: 2019-09-13 | End: 2019-09-13 | Stop reason: HOSPADM

## 2019-09-13 RX ORDER — ATORVASTATIN CALCIUM 40 MG/1
40 TABLET, FILM COATED ORAL NIGHTLY
Status: DISCONTINUED | OUTPATIENT
Start: 2019-09-13 | End: 2019-09-18 | Stop reason: HOSPADM

## 2019-09-13 RX ORDER — ISOSORBIDE MONONITRATE 30 MG/1
30 TABLET, EXTENDED RELEASE ORAL
Status: DISCONTINUED | OUTPATIENT
Start: 2019-09-13 | End: 2019-09-17

## 2019-09-13 RX ORDER — PROPOFOL 10 MG/ML
VIAL (ML) INTRAVENOUS AS NEEDED
Status: DISCONTINUED | OUTPATIENT
Start: 2019-09-13 | End: 2019-09-13 | Stop reason: SURG

## 2019-09-13 RX ORDER — SODIUM CHLORIDE 9 MG/ML
75 INJECTION, SOLUTION INTRAVENOUS CONTINUOUS
Status: DISCONTINUED | OUTPATIENT
Start: 2019-09-13 | End: 2019-09-13

## 2019-09-13 RX ORDER — CLOPIDOGREL BISULFATE 75 MG/1
75 TABLET ORAL DAILY
Status: DISCONTINUED | OUTPATIENT
Start: 2019-09-13 | End: 2019-09-18 | Stop reason: HOSPADM

## 2019-09-13 RX ORDER — MIDAZOLAM HYDROCHLORIDE 1 MG/ML
INJECTION INTRAMUSCULAR; INTRAVENOUS AS NEEDED
Status: DISCONTINUED | OUTPATIENT
Start: 2019-09-13 | End: 2019-09-13 | Stop reason: HOSPADM

## 2019-09-13 RX ORDER — FAMOTIDINE 20 MG/1
20 TABLET, FILM COATED ORAL DAILY
Status: DISCONTINUED | OUTPATIENT
Start: 2019-09-13 | End: 2019-09-18 | Stop reason: HOSPADM

## 2019-09-13 RX ORDER — SODIUM CHLORIDE 9 MG/ML
30 INJECTION, SOLUTION INTRAVENOUS CONTINUOUS
Status: DISCONTINUED | OUTPATIENT
Start: 2019-09-13 | End: 2019-09-13

## 2019-09-13 RX ORDER — ATROPINE SULFATE 1 MG/ML
.5-1 INJECTION, SOLUTION INTRAMUSCULAR; INTRAVENOUS; SUBCUTANEOUS
Status: DISCONTINUED | OUTPATIENT
Start: 2019-09-13 | End: 2019-09-18 | Stop reason: HOSPADM

## 2019-09-13 RX ADMIN — PROPOFOL 20 MG: 10 INJECTION, EMULSION INTRAVENOUS at 11:19

## 2019-09-13 RX ADMIN — SODIUM CHLORIDE 30 ML/HR: 900 INJECTION, SOLUTION INTRAVENOUS at 06:47

## 2019-09-13 RX ADMIN — ATORVASTATIN CALCIUM 40 MG: 40 TABLET, FILM COATED ORAL at 21:23

## 2019-09-13 RX ADMIN — PROPOFOL 20 MG: 10 INJECTION, EMULSION INTRAVENOUS at 11:17

## 2019-09-13 RX ADMIN — CLOPIDOGREL BISULFATE 75 MG: 75 TABLET ORAL at 18:27

## 2019-09-13 RX ADMIN — HYDRALAZINE HYDROCHLORIDE 50 MG: 25 TABLET, FILM COATED ORAL at 14:40

## 2019-09-13 RX ADMIN — SODIUM CHLORIDE 50 ML/HR: 900 INJECTION, SOLUTION INTRAVENOUS at 07:02

## 2019-09-13 RX ADMIN — PROPOFOL 20 MG: 10 INJECTION, EMULSION INTRAVENOUS at 11:22

## 2019-09-13 RX ADMIN — SODIUM CHLORIDE 50 ML/HR: 900 INJECTION, SOLUTION INTRAVENOUS at 14:04

## 2019-09-13 RX ADMIN — Medication 1200 MG: at 14:39

## 2019-09-13 RX ADMIN — PROPOFOL 20 MG: 10 INJECTION, EMULSION INTRAVENOUS at 11:24

## 2019-09-13 RX ADMIN — PROPOFOL 20 MG: 10 INJECTION, EMULSION INTRAVENOUS at 11:14

## 2019-09-13 RX ADMIN — PROPOFOL 20 MG: 10 INJECTION, EMULSION INTRAVENOUS at 11:28

## 2019-09-13 RX ADMIN — METOPROLOL TARTRATE 25 MG: 25 TABLET, FILM COATED ORAL at 21:23

## 2019-09-13 RX ADMIN — PROPOFOL 20 MG: 10 INJECTION, EMULSION INTRAVENOUS at 11:20

## 2019-09-13 RX ADMIN — SODIUM CHLORIDE 75 ML/HR: 900 INJECTION, SOLUTION INTRAVENOUS at 14:27

## 2019-09-13 RX ADMIN — PROPOFOL 20 MG: 10 INJECTION, EMULSION INTRAVENOUS at 11:26

## 2019-09-13 RX ADMIN — Medication 1200 MG: at 21:23

## 2019-09-13 RX ADMIN — PROPOFOL 100 MG: 10 INJECTION, EMULSION INTRAVENOUS at 11:13

## 2019-09-13 RX ADMIN — FAMOTIDINE 20 MG: 20 TABLET, FILM COATED ORAL at 14:40

## 2019-09-13 RX ADMIN — ASPIRIN 81 MG: 81 TABLET, DELAYED RELEASE ORAL at 18:27

## 2019-09-13 RX ADMIN — ISOSORBIDE MONONITRATE 30 MG: 30 TABLET, EXTENDED RELEASE ORAL at 18:18

## 2019-09-13 RX ADMIN — PROPOFOL 20 MG: 10 INJECTION, EMULSION INTRAVENOUS at 11:15

## 2019-09-13 NOTE — CONSULTS
Patient Care Team:  Zev Li MD as PCP - General (Family Medicine)  Referring Provider:  Dr. Kowalski  Reason for consultation:  MR/AI/CAD/dilated CMO    Chief complaint:  Orthopnea/PND    Subjective     History of Present Illness:  65 [facility] Li [facility] mid-August to establish care.  At that visit, his BP was 183/128 and he reported orthopnea and PND for the last couple weeks.  His wife reports he was sitting up in bed to sleep and had audible crackles and rhonchi.  He denies any viral illnesses, edema, congestion, or weight gain.  He also denies any exertional SOA or CP.  Dr. Li obtained CXR which showed cardiomegaly and echo showed EF 20%, dilated LV, mod to severe MR, mod AI, mild to mod SC, and mild TR. He was started on lisinopril which has been titrated up to 40 mg and metoprolol.  Pt denies any edema but it is documented from office appts that he had pedal edema.  He was referred to Dr. Kowalski and had elective right and left cardiac cath and JENNY today.  Cath revealed 2V CAD and JENNY's verbal report is mod MR/AI.  Right heart cath showed normal PA pressures, CI 2.7.  LVEDP 12.  PMHx is negative except for known untreated HTN.  Father had rheumatic heart disease and first MI at age 39 with death at 59 r/t MI.  Brother has atrial fib.  Since starting BP meds, he has been sleeping well without snoring or orthopnea per wife's report.  We were consulted for surgical evaluation.    Review of Systems   Constitutional: Negative for fatigue, fever and unexpected weight change.   HENT: Negative for congestion.    Respiratory: Positive for cough and shortness of breath.    Cardiovascular: Negative for chest pain, palpitations and leg swelling.   Gastrointestinal: Negative for abdominal pain, constipation, diarrhea, nausea and vomiting.        Past Medical History:   Diagnosis Date   • Cardiomyopathy (CMS/HCC)     e.f 20%   • Hypertension      Past Surgical History:   Procedure  "Laterality Date   • CARDIAC CATHETERIZATION N/A 2019    Procedure: Left Heart Cath and right heart cath;  Surgeon: Griselda Kowalski MD;  Location: [facility] CATH INVASIVE LOCATION;  Service: Cardiovascular     Family History   Problem Relation Age of Onset   • Cancer Mother    • Heart attack Father    · Brother has atrial fib  · Father had rheumatic heart disease, CAD,  at age 59 from MI  Social History   · Pt is a [employer]  and is .  Wife supportive.  Tobacco Use   • Smoking status: Never Smoker   • Smokeless tobacco: Never Used   Substance Use Topics   • Alcohol use: No     Frequency: Never   • Drug use: No     Medications Prior to Admission   Medication Sig Dispense Refill Last Dose   • lisinopril (PRINIVIL,ZESTRIL) 40 MG tablet Take 1 tablet by mouth Daily. 30 tablet 5 2019 at Unknown time   • metoprolol tartrate (LOPRESSOR) 25 MG tablet Take 1 tablet by mouth 2 (Two) Times a Day. 60 tablet 2 2019 at Unknown time       lisinopril 40 mg Oral Daily   metoprolol tartrate 25 mg Oral BID     Allergies:  Patient has no known allergies.    Objective      Vital Signs  Temp:  [97.7 °F (36.5 °C)-98 °F (36.7 °C)] 98 °F (36.7 °C)  Heart Rate:  [45-64] 53  Resp:  [13-18] 18  BP: (127-155)/(68-95) 142/71    Flowsheet Rows      First Filed Value   Admission Height  172.7 cm (68\") Documented at 2019 0628   Admission Weight  87 kg (191 lb 12.8 oz) Documented at 2019 0628        177.8 cm (70\")    Physical Exam   Constitutional: He is oriented to person, place, and time. Vital signs are normal. He appears well-developed and well-nourished. He is active and cooperative.   HENT:   Head: Normocephalic and atraumatic.   Nose: Nose normal.   Mouth/Throat: Uvula is midline, oropharynx is clear and moist and mucous membranes are normal.   Eyes: Conjunctivae, EOM and lids are normal. Pupils are equal, round, and reactive to light. No scleral icterus.   +glasses   Neck: Trachea normal. Neck " supple. Normal carotid pulses, no hepatojugular reflux and no JVD present. Carotid bruit is not present. No thyroid mass and no thyromegaly present.   Cardiovascular: Normal rate, regular rhythm, normal heart sounds and intact distal pulses.   No murmur heard.  Pulses:       Carotid pulses are 2+ on the right side, and 2+ on the left side.       Radial pulses are 2+ on the right side, and 2+ on the left side.        Femoral pulses are 2+ on the right side, and 2+ on the left side.       Popliteal pulses are 2+ on the right side, and 2+ on the left side.        Dorsalis pedis pulses are 2+ on the right side, and 2+ on the left side.        Posterior tibial pulses are 2+ on the right side, and 2+ on the left side.   Pulmonary/Chest: Effort normal and breath sounds normal.   On room air   Abdominal: Soft. Normal appearance, normal aorta and bowel sounds are normal. He exhibits no distension and no abdominal bruit. There is no hepatosplenomegaly. There is no tenderness.   Lymphadenopathy:     He has no cervical adenopathy.        Right: No supraclavicular adenopathy present.        Left: No supraclavicular adenopathy present.   Neurological: He is alert and oriented to person, place, and time. GCS eye subscore is 4. GCS verbal subscore is 5. GCS motor subscore is 6.   Skin: Skin is warm, dry and intact. Capillary refill takes less than 2 seconds. No rash noted. No cyanosis or erythema. Nails show no clubbing.   Psychiatric: He has a normal mood and affect. His speech is normal and behavior is normal. Judgment and thought content normal. Cognition and memory are normal.   Nursing note and vitals reviewed.      Results Review:   Lab Results (last 24 hours)     ** No results found for the last 24 hours. **      na 141  k 4.6  Cr 1.3 (1.1)        Assessment/Plan       Essential hypertension    Dilated cardiomyopathy (CMS/HCC)    Acute systolic congestive heart failure (CMS/HCC)    Dilated non-ischemic cardiomyopathy, EF  20%--evaluation in progress  MV CAD--evaluation in progress  VHD--moderate MR/AI  Acute HFrEF, NYHA class 4--improved  HTN  CKD, stage 3--renal consulted  Left bundle branch block    Assessment & Plan  Patient has mild to moderate AI and MR and 2 vessel CAD. No CP. Recommend PCI vs. Medical TX per cardiology. F/U of his valves with echo     Aviva Lawson, ROSALINA  09/13/19  1:55 PM    **all problems new to this examiner  **EKG and CXR independently reviewed and interpreted

## 2019-09-13 NOTE — CONSULTS
Referring Provider: Dr. Garcia  Reason for Consultation: Cardiomyopathy    Chief complaint shortness of breath        History of present illness:  Devaughn Valdez is a 64 y.o. male who presents with recent diagnosis of severe dilated cardiomyopathy and severe LV dysfunction.   Presented for outpatient cardiac catheterization and noted to have worsening renal failure and diagnosed with a severe two-vessel coronary artery disease    Plan to admit the patient to the hospital for worsening renal failure for nephrology evaluation and also for consideration for high risk PCI to the LAD and right coronary artery    Assessment and plan:   Known severe cardiomyopathy  Severe two-vessel coronary artery disease  Moderate to severe mitral regurgitation on echocardiogram  Moderate aortic regurgitation on echocardiogram  Normal PA pressures  Worsening renal failure  Severe LV dysfunction     RECOMMENDATIONS:   Admit the patient to the hospital for further evaluation treatment options  JENNY to further assess the mitral regurgitation and aortic regurgitation  Nephrology evaluation for worsening renal failure  Surgical consultation for the valvular heart disease  Consider high risk intervention to the LAD  Further recommendations based on patient course      Review of Systems  Review of Systems   Constitution: Negative for chills, decreased appetite, malaise/fatigue and night sweats.   HENT: Negative for congestion and nosebleeds.    Eyes: Negative for blurred vision and double vision.   Cardiovascular: Positive for dyspnea on exertion, leg swelling, orthopnea and paroxysmal nocturnal dyspnea. Negative for chest pain, near-syncope, palpitations and syncope.   Respiratory: Positive for shortness of breath. Negative for cough, hemoptysis and wheezing.    Hematologic/Lymphatic: Negative for adenopathy. Does not bruise/bleed easily.   Skin: Negative for rash.   Musculoskeletal: Negative for arthritis, falls, joint swelling and muscle  weakness.   Gastrointestinal: Negative for bloating, abdominal pain, bowel incontinence, constipation, hematemesis and hematochezia.   Genitourinary: Negative for flank pain and hematuria.   Neurological: Negative for dizziness, focal weakness, loss of balance, paresthesias and seizures.   Psychiatric/Behavioral: Negative for altered mental status and substance abuse.       Past Medical History  Past Medical History:   Diagnosis Date   • Cardiomyopathy (CMS/HCC)     e.f 20%   • Hypertension     and History reviewed. No pertinent surgical history.    Family History  Family History   Problem Relation Age of Onset   • Cancer Mother    • Heart attack Father        Social History  Social History     Socioeconomic History   • Marital status:      Spouse name: Not on file   • Number of children: Not on file   • Years of education: Not on file   • Highest education level: Not on file   Tobacco Use   • Smoking status: Never Smoker   • Smokeless tobacco: Never Used   Substance and Sexual Activity   • Alcohol use: No     Frequency: Never   • Drug use: No       Objective     Physical Exam:  Physical Exam   Constitutional: He is oriented to person, place, and time. He appears well-developed and well-nourished.   HENT:   Head: Normocephalic and atraumatic.   Eyes: Conjunctivae are normal. Pupils are equal, round, and reactive to light.   Neck: Normal range of motion. Neck supple. No thyromegaly present.   Cardiovascular: Normal rate, regular rhythm, S1 normal, S2 normal and intact distal pulses. PMI is displaced.   Murmur heard.   Medium-pitched holosystolic murmur is present with a grade of 2/6 at the upper right sternal border.  Pulmonary/Chest: Effort normal and breath sounds normal.   Abdominal: Soft. Bowel sounds are normal.   Musculoskeletal: He exhibits no edema.   Neurological: He is alert and oriented to person, place, and time.   Skin: Skin is warm.   Nursing note and vitals reviewed.      Vital Signs  Vitals:     "09/13/19 0803 09/13/19 0813 09/13/19 0836 09/13/19 1135   BP:    127/74   Pulse:    64   Resp:    14   Temp:       TempSrc:       SpO2: 100% 93% 98% 98%   Weight:       Height:           Weight  Flowsheet Rows      First Filed Value   Admission Height  172.7 cm (68\") Documented at 09/13/2019 0628   Admission Weight  87 kg (191 lb 12.8 oz) Documented at 09/13/2019 0628              Results Review:  Lab Results (last 24 hours)     ** No results found for the last 24 hours. **        Imaging Results (last 72 hours)     ** No results found for the last 72 hours. **          Results for orders placed during the hospital encounter of 08/22/19   Adult Transthoracic Echo Complete W/ Cont if Necessary Per Protocol    Narrative · The left ventricular cavity is moderately dilated.  · Left ventricular wall thickness is consistent with mild concentric   hypertrophy.  · Mild-to-moderate pulmonic valve regurgitation is present.  · Estimated EF = 20%.  · Left ventricular systolic function is severely decreased.  · Moderate-to-severe mitral valve regurgitation is present  · Left ventricular diastolic dysfunction (grade I a) consistent with   impaired relaxation.  · Moderate aortic valve regurgitation is present.  · Mild tricuspid valve regurgitation is present.  · Left atrial cavity size is mild-to-moderately dilated.  · Right ventricular cavity is mildly dilated.  · Mildly reduced right ventricular systolic function noted.  · The following left ventricular wall segments are hypokinetic: mid   anterior, apical anterior, basal anterolateral, mid anterolateral, apical   lateral, basal inferolateral, mid inferolateral, apical inferior, mid   inferior, apical septal, basal inferoseptal, mid inferoseptal, apex   hypokinetic, mid anteroseptal, basal anterior, basal inferior and basal   inferoseptal.          Medication Review  Scheduled Meds:  lisinopril 40 mg Oral Daily   metoprolol tartrate 25 mg Oral BID     Continuous " Infusions:  sodium chloride 30 mL/hr Last Rate: 0 mL/hr (09/13/19 0700)   sodium chloride 50 mL/hr Last Rate: 50 mL/hr (09/13/19 0702)     PRN Meds:.•  atropine  •  sodium chloride    Assessment/Plan       Dilated cardiomyopathy (CMS/HCC)    Acute systolic congestive heart failure (CMS/HCC)    Essential hypertension      Patient Active Problem List   Diagnosis   • Physical exam   • Orthopnea   • Colon cancer screening   • Essential hypertension   • Dilated cardiomyopathy (CMS/HCC)   • Acute systolic congestive heart failure (CMS/HCC)           Griselda Kowalski MD  09/13/19  11:58 AM

## 2019-09-13 NOTE — ANESTHESIA POSTPROCEDURE EVALUATION
Patient: Devaughn Valdez    Procedure Summary     Date:  09/13/19 Room / Location:  [facility]    Anesthesia Start:  1110 Anesthesia Stop:  1137    Procedure:  ADULT TRANSESOPHAGEAL ECHO (JENNY) W/ CONT IF NECESSARY PER PROTOCOL Diagnosis:  (Valvular Disease)    Scheduled Providers:  Raphael Muniz DO Provider:  Eugene Hollins MD    Anesthesia Type:  MAC ASA Status:  4          Anesthesia Type: MAC  Last vitals  BP   127/74 (09/13/19 1135)   Temp   97.7 °F (36.5 °C) (09/13/19 0628)   Pulse   64 (09/13/19 1135)   Resp   14 (09/13/19 1135)     SpO2   98 % (09/13/19 1135)     Post Anesthesia Care and Evaluation    Patient location during evaluation: bedside  Patient participation: complete - patient participated  Level of consciousness: awake  Pain scale: See nurse's notes for pain score.  Pain management: adequate  Airway patency: patent  Anesthetic complications: No anesthetic complications  PONV Status: none  Cardiovascular status: acceptable  Respiratory status: acceptable  Hydration status: acceptable    Comments: Patient seen and examined postoperatively; vital signs stable; SpO2 greater than or equal to 90%; cardiopulmonary status stable; nausea/vomiting adequately controlled; pain adequately controlled; no apparent anesthesia complications; patient discharged from anesthesia care when discharge criteria were met

## 2019-09-13 NOTE — ANESTHESIA POSTPROCEDURE EVALUATION
Patient: Devaughn Valdez    Procedure Summary     Date:  09/13/19 Room / Location:  [facility]    Anesthesia Start:  1110 Anesthesia Stop:  1137    Procedure:  ADULT TRANSESOPHAGEAL ECHO (JENNY) W/ CONT IF NECESSARY PER PROTOCOL Diagnosis:  (Valvular Disease)    Scheduled Providers:  Raphael Muniz DO Provider:  Eugene Hollins MD    Anesthesia Type:  MAC ASA Status:  4          Anesthesia Type: MAC  Last vitals  BP   127/74 (09/13/19 1135)   Temp   97.7 °F (36.5 °C) (09/13/19 0628)   Pulse   64 (09/13/19 1135)   Resp   14 (09/13/19 1135)     SpO2   98 % (09/13/19 1135)     Post Anesthesia Care and Evaluation    Patient location during evaluation: bedside  Patient participation: complete - patient participated  Level of consciousness: awake  Pain scale: See nurse's notes for pain score.  Pain management: adequate  Airway patency: patent  Anesthetic complications: No anesthetic complications  PONV Status: none  Cardiovascular status: acceptable  Respiratory status: acceptable  Hydration status: acceptable    Comments: Patient seen and examined postoperatively; vital signs stable; SpO2 greater than or equal to 90%; cardiopulmonary status stable; nausea/vomiting adequately controlled; pain adequately controlled; no apparent anesthesia complications; patient discharged from anesthesia care when discharge criteria were met

## 2019-09-13 NOTE — ADDENDUM NOTE
Addendum  created 09/13/19 1212 by Eugene Hollins MD    Allergies reviewed, Review and Sign - Ready for Procedure

## 2019-09-13 NOTE — CONSULTS
Referring Provider: Griselda Kowalski,*  Reason for Consultation:  medical management    Patient Care Team:  Zev Li MD as PCP - General (Family Medicine)    Chief complaint shortness of breath, LE edema     Subjective .     History of present illness:  Devaughn Valdez is a 64 y.o. male with recent diagnosis of severe dilated cardiomyopathy and severe LV dysfunction EF 20% who was brought in for left heart catheterization. He was found to have severe two-vessel disease. Patient was admitted to the PCU for further evaluation by CT surgery to determine if patient is a candidate for surgery versus high risk PCI to LAD and right coronary artery. Patient also has some acute kidney injury with creatinine 1.3 and nephrology was consulted. Pt was admitted to the hospitalist group. Pt denied any current complaints on exam. He stated he was referred to Dr. Kowalski recently by PCP Dr. Li for worsening shortness of breath. He then had the echo that showed the cardiomyopathy. He was started on lisinopril and metoprolol, which he has been taking.       Review of Systems  Pertinent items are noted in HPI, all other systems reviewed and negative    History  Past Medical History:   Diagnosis Date   • Cardiomyopathy (CMS/HCC)     e.f 20%   • Hypertension    , History reviewed. No pertinent surgical history.,   Family History   Problem Relation Age of Onset   • Cancer Mother    • Heart attack Father    ,   Social History     Tobacco Use   • Smoking status: Never Smoker   • Smokeless tobacco: Never Used   Substance Use Topics   • Alcohol use: No     Frequency: Never   • Drug use: No    and Allergies:  Patient has no known allergies.    Objective     Vital Signs   Temp:  [97.7 °F (36.5 °C)] 97.7 °F (36.5 °C)  Heart Rate:  [51-64] 64  Resp:  [14] 14  BP: (127-155)/(74-79) 127/74    Physical Exam:     General Appearance:    Alert, cooperative, in no acute distress   Head:    Normocephalic, without obvious abnormality,  atraumatic   Eyes:            Lids and lashes normal, conjunctivae and sclerae normal, no   icterus, no pallor, corneas clear, PERRLA   Neck:   No adenopathy, supple, trachea midline, no thyromegaly, no   carotid bruit, no JVD   Back:     range of motion normal   Lungs:     Clear to auscultation,respirations regular, even and                  unlabored    Heart:    Bradycardia, Regular rhythm and normal rate, normal S1 and S2, no murmur   Chest Wall:    No abnormalities observed   Abdomen:     Normal bowel sounds, no masses, no organomegaly, soft        non-tender   Rectal:     Deferred   Extremities:   Moves all extremities well, no edema, no cyanosis, no             redness   Pulses:   Pulses palpable and equal bilaterally   Skin:   No bleeding, bruising or rash   Neurologic:   Cranial nerves 2 - 12 grossly intact       Assessment/Plan     Two vessel Coronary Artery Disease   - [facility] 9/13/2019:       Left main normal.       LAD: Mid LAD has angiographic 30 to 40% stenosis before the second diagonal branch. Mid LAD at the origin of the second diagonal branch is a 1 focal area of 90% stenosis with haziness. Ostium of the second diagonal branch has a angiographic 50% stenosis. First diagonal branch is a small sized vessel has ostial 90% stenosis      Left circumflex artery: Mid 20% stenosis      RCA: Mid right coronary artery has 70% stenosis      Known severe cardiomyopathy       Moderate to severe mitral regurgitation on echocardiogram      Moderate aortic regurgitation on echocardiogram  - JENNY ordered  - CT surgery consulted to determine surgical candidate versus high risk intervention to LAD  - cardiology managing     Chronic systolic congestive heart failure (CMS/HCC), Dilated cardiomyopathy   - known EF 20%  - stable. No edema or SOA currently  - cont home metoprolol  - hold ACE-I per nephrology    Acute kidney Injury  - Cr 1.3 prior to heart cath then patient received contrast dye  - nephrology consulted and  wants ACE-I stopped, bilateral renal US, on mucomyst 1200mg x4 doses, on IVFs 75cc/hr    Hypertension  - borderline controlled on metoprolol  - hydralazine added by nephrology    DVT prophylaxis - none today as patient just had heart cath    Isabela Llanes, ROSALINA  09/13/19  12:08 PM

## 2019-09-13 NOTE — CONSULTS
NEPHROLOGY CONSULTATION-----[facility]    [facility]  [phone]  Heide Reno MD    Patient Care Team:  Zev Li MD as PCP - General (Family Medicine)    CC/REASON FOR CONSULTATION: RENAL FAILURE/ELEVATED SERUM CREATININE  CONSULTING PHYSICIAN: DR. CAMERON    History of Present Illness    HPI    Patient is a 64 y.o. WM whom I was asked to see in consultation for evaluation and management of renal failure/elevated serum creatinine. Patient was admitted with shortness of breath, cardiomyopathy and is S/P heart cath.   Patient denies prior knowledge of functional kidney disease, proteinuria, or hematuria. +Very Occasional NSAID in form of Advil.  About 2 weeks ago was started on Lisinopril by Dr.Karem bey/abiel of a SBP of 190 at his office. +  IV dye exposure today from heart catheterization.  No known h/o hepatitis, TB, rheumatic fever, jaundice, SLE, bleeding/bruising disorders.  No urinary sx. No edema or fluid retention.  +Compliance with home meds.  Was on ACE-I/ARB in the form of lisinopril prior to admission. No herbal med use.    Review of Systems   Constitutional: Positive for fatigue. Negative for activity change, appetite change, chills, diaphoresis, fever and unexpected weight change.   HENT: Negative for congestion, dental problem, drooling, ear discharge, ear pain, facial swelling, hearing loss, mouth sores, nosebleeds, postnasal drip, rhinorrhea, sinus pressure, sinus pain, sneezing, sore throat, tinnitus, trouble swallowing and voice change.    Eyes: Negative for photophobia, pain, discharge, redness, itching and visual disturbance.   Respiratory: Positive for shortness of breath. Negative for apnea, cough, choking, chest tightness, wheezing and stridor.    Cardiovascular: Positive for leg swelling. Negative for chest pain and palpitations.   Gastrointestinal: Negative for abdominal distention, abdominal pain, anal bleeding, blood in stool,  constipation, diarrhea, nausea, rectal pain and vomiting.   Endocrine: Negative for cold intolerance, heat intolerance, polydipsia, polyphagia and polyuria.   Genitourinary: Negative for decreased urine volume, difficulty urinating, dysuria, enuresis, flank pain, frequency, genital sores, hematuria and urgency.   Musculoskeletal: Positive for arthralgias and myalgias. Negative for back pain, gait problem, joint swelling, neck pain and neck stiffness.   Skin: Negative for color change, pallor, rash and wound.   Allergic/Immunologic: Negative for environmental allergies, food allergies and immunocompromised state.   Neurological: Negative for dizziness, tremors, seizures, syncope, facial asymmetry, speech difficulty, weakness, light-headedness, numbness and headaches.   Hematological: Negative for adenopathy. Does not bruise/bleed easily.   Psychiatric/Behavioral: Negative for agitation, behavioral problems, confusion, decreased concentration, dysphoric mood, hallucinations, self-injury, sleep disturbance and suicidal ideas. The patient is not nervous/anxious and is not hyperactive.               Past Medical History:   Diagnosis Date   • Cardiomyopathy (CMS/HCC)     e.f 20%   • Hypertension        Past Surgical History:   Procedure Laterality Date   • CARDIAC CATHETERIZATION N/A 9/13/2019    Procedure: Left Heart Cath and right heart cath;  Surgeon: Griselda Kowalski MD;  Location: [facility] CATH INVASIVE LOC[facility];  Service: Cardiovascular       Family History   Problem Relation Age of Onset   • Cancer Mother    • Heart attack Father        Social History     Tobacco Use   • Smoking status: Never Smoker   • Smokeless tobacco: Never Used   Substance Use Topics   • Alcohol use: No     Frequency: Never   • Drug use: No       Home Meds:   Medications Prior to Admission   Medication Sig Dispense Refill Last Dose   • lisinopril (PRINIVIL,ZESTRIL) 40 MG tablet Take 1 tablet by mouth Daily. 30 tablet 5 9/12/2019 at  Unknown time   • metoprolol tartrate (LOPRESSOR) 25 MG tablet Take 1 tablet by mouth 2 (Two) Times a Day. 60 tablet 2 9/13/2019 at Unknown time       Scheduled Meds:    lisinopril 40 mg Oral Daily   metoprolol tartrate 25 mg Oral BID       Continuous Infusions:    sodium chloride 30 mL/hr Last Rate: 0 mL/hr (09/13/19 0700)   sodium chloride 50 mL/hr Last Rate: 50 mL/hr (09/13/19 0702)       PRN Meds:  •  atropine  •  sodium chloride    Allergies:  Patient has no known allergies.    OBJECTIVE    Vital Signs  Temp:  [97.7 °F (36.5 °C)-98 °F (36.7 °C)] 98 °F (36.7 °C)  Heart Rate:  [45-64] 53  Resp:  [13-18] 18  BP: (127-155)/(68-95) 142/71    I/O this shift:  In: 100 [I.V.:100]  Out: -   No intake/output data recorded.    Physical Exam:  General Appearance: alert, appears stated age and cooperative  Head: normocephalic, without obvious abnormality and atraumatic  Eyes: conjunctivae and sclerae normal and no icterus  Neck: supple and no JVD  Lungs: clear to auscultation and respirations regular. +SCATTERED RHONCHI  Heart: regular rhythm & normal rate and normal S1, S2. +DAVID  Chest Wall: no abnormalities observed  Abdomen: normal bowel sounds and soft non-tender  Extremities: moves extremities well, no edema, no cyanosis and no redness. +1BLLE  Skin: no bleeding, bruising or rash  Neurologic: Alert, and oriented. No focal deficits    Results Review:    I reviewed the patient's new clinical results.    WBC No results found for: WBC   HGB No results found for: HGB   HCT No results found for: HCT   Platlets No results found for: LABPLAT   MCV No results found for: MCV       Sodium Sodium   Date Value Ref Range Status   09/13/2019 141 136 - 144 mmol/L Final      Potassium Potassium   Date Value Ref Range Status   09/13/2019 4.6 3.6 - 5.1 mmol/L Final      Chloride Chloride   Date Value Ref Range Status   09/13/2019 105 101 - 111 mmol/L Final      CO2 CO2   Date Value Ref Range Status   09/13/2019 27.0 22.0 - 32.0 mmol/L  Final      BUN BUN   Date Value Ref Range Status   09/13/2019 17 8 - 20 mg/dL Final      Creatinine Creatinine   Date Value Ref Range Status   09/13/2019 1.30 (H) 0.70 - 1.20 mg/dL Final      Calcium Calcium   Date Value Ref Range Status   09/13/2019 9.1 8.9 - 10.3 mg/dL Final      PO4 No results found for: CAPO4   Albumin No results found for: ALBUMIN   Magnesium No results found for: MG   Uric Acid No results found for: URICACID       Imaging Results (last 72 hours)     ** No results found for the last 72 hours. **            Results for orders placed in visit on 08/15/19   XR Chest PA & Lateral              ASSESSMENT / PLAN      Essential hypertension    Dilated cardiomyopathy (CMS/HCC)    Acute systolic congestive heart failure (CMS/HCC)    1. RENAL FAILURE--- +Nonoliguric. +very mild ARF/MARINA on top of what appears to be CRF/CKD stage 2 with baseline serum creatinine of 1.1mg/dl. Scr currently 1.3mg/dl. +ARF/MARINA secondary to prerenal state/intravascular volume depletion with concomitant use of ACE-I in form of lisinopril. Stop lisinopril. Will continue very gentle IVFs until tomorrow AM. Check serum and urine studies as well as Bilateral Renal US and renal artery duplex and PVR. No NSAIDs. Will keep on Mucomyst as patient may need repeat PCI on Monday. Will also need gentle IVFs started on Sunday night if PCI planned for Monday. Patient was explained the risks of IV dye he was exposed to today and I will given mucomyst and IVF post procedure. Dose meds for CrCl<30cc/min.    2. CAD--- s/p heart cath today. Lesion noted in LAD at bifurcation. Will discuss stent option vs CTS. CTS to evaluate. Patient on IVF for 8 hours post heart cath today.     3. VALVULAR HEART DISEASE/CARDIOMYOPATHY--- EF reportedly 20% on last echo but patient without edema or shortness of breath.Gentle IVF for 12 hours post heart cath.     4. HTN WITH CKD---- No ACE/ARB/DRI/Diuretics at this time. Agree with addition of Hydralazine for  afterload reduction. Will add low dose Imdur too.    5. PUD PROPHYLAXIS--- Start renal dose pepcid.    6. DVT PROPHYLAXIS--- SCD's     7. OA/DJD------No NSAIDs. Check uric acid level      I discussed the patients findings and my recommendations with patient, family and nursing staff    Will follow along closely. Thank you for allowing us to see this patient in renal consultation.    [facility]  [phone]  MD Heide Hinojosa MD  09/13/19  1:45 PM

## 2019-09-13 NOTE — ANESTHESIA PREPROCEDURE EVALUATION
Anesthesia Evaluation     NPO Solid Status: > 8 hours  NPO Liquid Status: > 8 hours           Airway   Mallampati: I  TM distance: >3 FB  Neck ROM: full  No difficulty expected  Dental - normal exam         Pulmonary - normal exam   (+) shortness of breath,   Cardiovascular - normal exam  Exercise tolerance: poor (<4 METS)    (+) hypertension, CHF, orthopnea,       Neuro/Psych  GI/Hepatic/Renal/Endo      Musculoskeletal     Abdominal  - normal exam    Bowel sounds: normal.   Substance History      OB/GYN          Other                        Anesthesia Plan    ASA 4     MAC     intravenous induction   Anesthetic plan, all risks, benefits, and alternatives have been provided, discussed and informed consent has been obtained with: spouse/significant other.

## 2019-09-14 ENCOUNTER — HOSPITAL ENCOUNTER (INPATIENT)
Dept: CARDIOLOGY | Facility: HOSPITAL | Age: 64
Discharge: HOME | End: 2019-09-14

## 2019-09-14 PROBLEM — I34.0 NON-RHEUMATIC MITRAL REGURGITATION: Status: ACTIVE | Noted: 2019-09-14

## 2019-09-14 PROBLEM — I25.119 CORONARY ARTERY DISEASE INVOLVING NATIVE CORONARY ARTERY OF NATIVE HEART WITH ANGINA PECTORIS: Status: ACTIVE | Noted: 2019-09-14

## 2019-09-14 PROBLEM — I35.1 NONRHEUMATIC AORTIC VALVE INSUFFICIENCY: Status: ACTIVE | Noted: 2019-09-14

## 2019-09-14 LAB
ALBUMIN SERPL-MCNC: 3 G/DL (ref 3.5–4.8)
ALBUMIN/GLOB SERPL: 1.1 G/DL (ref 1–1.7)
ALP SERPL-CCNC: 44 U/L (ref 32–91)
ALT SERPL W P-5'-P-CCNC: 26 U/L (ref 17–63)
ANION GAP SERPL CALCULATED.3IONS-SCNC: 9.9 MMOL/L (ref 5–15)
AST SERPL-CCNC: 21 U/L (ref 15–41)
BH CV ECHO MEAS - DIST REN A PSV LEFT: 64 CM/S
BH CV ECHO MEAS - MID REN A PSV LEFT: 56 CM/S
BH CV ECHO MEAS - PROX REN A PSV LEFT: 78 CM/S
BH CV VAS RENAL AORTIC MID PSV: 122 CM/S
BH CV XLRA MEAS - KID L LEFT: 10.7 CM
BH CV XLRA MEAS DIST REN A PSV RIGHT: 46 CM/S
BH CV XLRA MEAS KID L RIGHT: 9.6 CM
BH CV XLRA MEAS MID REN A PSV RIGHT: 85 CM/S
BH CV XLRA MEAS PROX REN A PSV RIGHT: 133 CM/S
BH CV XLRA MEAS RAR LEFT: 0.64
BH CV XLRA MEAS RAR RIGHT: 1.1
BILIRUB SERPL-MCNC: 1.3 MG/DL (ref 0.3–1.2)
BUN BLD-MCNC: 17 MG/DL (ref 8–20)
BUN/CREAT SERPL: 15.5 (ref 6.2–20.3)
CA-I SERPL ISE-MCNC: 1.21 MMOL/L (ref 1.2–1.3)
CALCIUM SPEC-SCNC: 8.3 MG/DL (ref 8.9–10.3)
CHLORIDE SERPL-SCNC: 108 MMOL/L (ref 101–111)
CK SERPL-CCNC: 29 U/L (ref 49–397)
CO2 SERPL-SCNC: 24 MMOL/L (ref 22–32)
CREAT BLD-MCNC: 1.1 MG/DL (ref 0.7–1.2)
DEPRECATED RDW RBC AUTO: 42.9 FL (ref 37–54)
ERYTHROCYTE [DISTWIDTH] IN BLOOD BY AUTOMATED COUNT: 13.8 % (ref 12.3–15.4)
GFR SERPL CREATININE-BSD FRML MDRD: 68 ML/MIN/1.73
GLOBULIN UR ELPH-MCNC: 2.7 GM/DL (ref 2.5–3.8)
GLUCOSE BLD-MCNC: 123 MG/DL (ref 65–99)
HCT VFR BLD AUTO: 39.9 % (ref 37.5–51)
HGB BLD-MCNC: 13.6 G/DL (ref 13–17.7)
IRON 24H UR-MRATE: 56 MCG/DL (ref 45–182)
MAGNESIUM SERPL-MCNC: 1.8 MG/DL (ref 1.8–2.5)
MCH RBC QN AUTO: 30.3 PG (ref 26.6–33)
MCHC RBC AUTO-ENTMCNC: 34.1 G/DL (ref 31.5–35.7)
MCV RBC AUTO: 88.9 FL (ref 79–97)
PHOSPHATE SERPL-MCNC: 3 MG/DL (ref 2.4–4.7)
PLATELET # BLD AUTO: 84 10*3/MM3 (ref 140–450)
PMV BLD AUTO: 10.6 FL (ref 6–12)
POTASSIUM BLD-SCNC: 3.9 MMOL/L (ref 3.6–5.1)
PROT SERPL-MCNC: 5.7 G/DL (ref 6.1–7.9)
RBC # BLD AUTO: 4.49 10*6/MM3 (ref 4.14–5.8)
SODIUM BLD-SCNC: 138 MMOL/L (ref 136–144)
TSH SERPL DL<=0.05 MIU/L-ACNC: 0.67 UIU/ML (ref 0.34–5.6)
WBC NRBC COR # BLD: 10.7 10*3/MM3 (ref 3.4–10.8)

## 2019-09-14 PROCEDURE — 84100 ASSAY OF PHOSPHORUS: CPT | Performed by: INTERNAL MEDICINE

## 2019-09-14 PROCEDURE — 80053 COMPREHEN METABOLIC PANEL: CPT | Performed by: INTERNAL MEDICINE

## 2019-09-14 PROCEDURE — 82550 ASSAY OF CK (CPK): CPT | Performed by: INTERNAL MEDICINE

## 2019-09-14 PROCEDURE — 99233 SBSQ HOSP IP/OBS HIGH 50: CPT | Performed by: INTERNAL MEDICINE

## 2019-09-14 PROCEDURE — 85027 COMPLETE CBC AUTOMATED: CPT | Performed by: INTERNAL MEDICINE

## 2019-09-14 PROCEDURE — 84443 ASSAY THYROID STIM HORMONE: CPT | Performed by: INTERNAL MEDICINE

## 2019-09-14 PROCEDURE — 82330 ASSAY OF CALCIUM: CPT | Performed by: INTERNAL MEDICINE

## 2019-09-14 PROCEDURE — 83540 ASSAY OF IRON: CPT | Performed by: INTERNAL MEDICINE

## 2019-09-14 PROCEDURE — 93975 VASCULAR STUDY: CPT

## 2019-09-14 PROCEDURE — 99232 SBSQ HOSP IP/OBS MODERATE 35: CPT | Performed by: INTERNAL MEDICINE

## 2019-09-14 PROCEDURE — 83735 ASSAY OF MAGNESIUM: CPT | Performed by: INTERNAL MEDICINE

## 2019-09-14 RX ADMIN — ASPIRIN 81 MG: 81 TABLET, DELAYED RELEASE ORAL at 10:52

## 2019-09-14 RX ADMIN — METOPROLOL TARTRATE 25 MG: 25 TABLET, FILM COATED ORAL at 20:26

## 2019-09-14 RX ADMIN — METOPROLOL TARTRATE 25 MG: 25 TABLET, FILM COATED ORAL at 10:52

## 2019-09-14 RX ADMIN — ATORVASTATIN CALCIUM 40 MG: 40 TABLET, FILM COATED ORAL at 20:26

## 2019-09-14 RX ADMIN — ISOSORBIDE MONONITRATE 30 MG: 30 TABLET, EXTENDED RELEASE ORAL at 10:54

## 2019-09-14 RX ADMIN — ACETAMINOPHEN 650 MG: 325 TABLET ORAL at 18:10

## 2019-09-14 RX ADMIN — HYDRALAZINE HYDROCHLORIDE 50 MG: 25 TABLET, FILM COATED ORAL at 20:26

## 2019-09-14 RX ADMIN — Medication 1200 MG: at 20:26

## 2019-09-14 RX ADMIN — FAMOTIDINE 20 MG: 20 TABLET, FILM COATED ORAL at 10:53

## 2019-09-14 RX ADMIN — Medication 1200 MG: at 10:55

## 2019-09-14 RX ADMIN — HYDRALAZINE HYDROCHLORIDE 50 MG: 25 TABLET, FILM COATED ORAL at 10:54

## 2019-09-14 RX ADMIN — CLOPIDOGREL BISULFATE 75 MG: 75 TABLET ORAL at 10:53

## 2019-09-14 NOTE — PROGRESS NOTES
"Hospitalist Team      Chief Complaint:  Follow up cardiomyopathy   HPI:  64 y.o. male with recent diagnosis of severe dilated cardiomyopathy and severe LV dysfunction EF 20% who was brought in for left heart catheterization. He was found to have severe two-vessel disease. Patient was admitted to the PCU for further evaluation by CT surgery to determine if patient is a candidate for surgery versus high risk PCI to LAD and right coronary artery. Patient also has some acute kidney injury with creatinine 1.3 and nephrology was consulted. Pt was admitted to the hospitalist group. Pt denied any current complaints on exam. He stated he was referred to Dr. Kowalski recently by PCP Dr. Li for worsening shortness of breath. He then had the echo that showed the cardiomyopathy. He was started on lisinopril and metoprolol, which he has been taking.       Subjective      No new issues. No chest pain. No shortness of breath   Review of Systems   Respiratory: Negative for shortness of breath.    Cardiovascular: Negative for chest pain.       Objective    Vital Signs  Temp:  [97.4 °F (36.3 °C)-100.7 °F (38.2 °C)] 97.4 °F (36.3 °C)  Heart Rate:  [] 71  Resp:  [14-20] 18  BP: (107-162)/(58-87) 131/69  Oxygen Therapy  SpO2: 98 %  Pulse Oximetry Type: Intermittent  Device (Oxygen Therapy): room air  Flowsheet Rows      First Filed Value   Admission Height  172.7 cm (68\") Documented at 09/13/2019 0628   Admission Weight  87 kg (191 lb 12.8 oz) Documented at 09/13/2019 0628        Intake & Output (last 3 days)       09/11 0701 - 09/12 0700 09/12 0701 - 09/13 0700 09/13 0701 - 09/14 0700 09/14 0701 - 09/15 0700    P.O.   120     I.V. (mL/kg)   100 (1.1)     Total Intake(mL/kg)   220 (2.5)     Urine (mL/kg/hr)   1890 (0.9)     Total Output   1890     Net   -1670                 Lines, Drains & Airways    Active LDAs     Name:   Placement date:   Placement time:   Site:   Days:    Peripheral IV 09/13/19 0645 Left Antecubital   " 09/13/19    0645    Antecubital   1                Physical Exam:    Gen: NAD  HEENT: EOMI  Heart: RRR, no murmur  Lung: CTA b/l, adequate air movement  ABD: soft, NT  MSK: moves ext spontaneously  Neuro: AO x 3  Psych: no anxiety, no depression  Skin: warm, dry, intact  Extremities:  No edema    Results Review:     I reviewed the patient's new clinical results.    Results from last 7 days   Lab Units 09/14/19 0220 09/13/19 1930   WBC 10*3/mm3 10.70 11.10*   HEMOGLOBIN g/dL 13.6 14.6   HEMATOCRIT % 39.9 43.6   PLATELETS 10*3/mm3 84* 93*     Results from last 7 days   Lab Units 09/14/19 0220 09/13/19 1930 09/13/19 0609   SODIUM mmol/L 138 140 141   POTASSIUM mmol/L 3.9 4.0 4.6   CHLORIDE mmol/L 108 105 105   CO2 mmol/L 24.0 25.0 27.0   BUN mg/dL 17 17 17   CREATININE mg/dL 1.10 1.30* 1.30*   CALCIUM mg/dL 8.3* 8.8* 9.1   BILIRUBIN mg/dL 1.3*  --   --    ALK PHOS U/L 44  --   --    ALT (SGPT) U/L 26  --   --    AST (SGOT) U/L 21  --   --    GLUCOSE mg/dL 123* 165* 102*     Results from last 7 days   Lab Units 09/14/19 0220   MAGNESIUM mg/dL 1.8                 @lastrespira@                   Imaging Results (last 24 hours)     Procedure Component Value Units Date/Time    US Renal Bilateral [[id number]] Collected:  09/13/19 2059     Updated:  09/13/19 2103    Narrative:       Examination:      Date of Exam: 9/13/2019 7:45 PM     Indication: acute renal failure; R06.01-Orthopnea; I42.0-Dilated  cardiomyopathy; I50.21-Acute systolic (congestive) heart failure;  I10-Essential (primary) hypertension.     Comparison: None available.     Technique: Grayscale and color Doppler ultrasound evaluation of the  kidneys and urinary bladder was performed     Findings:  The right kidney is about 11 cm in length by 4.7 x 7.5 seen.     The left kidney is about 10.5 cm in length by 4 x 5 cm.        Renal cortical thickness and echogenicity appear within normal. Neither  kidney is hydronephrotic. There is color flow to each kidney.       Limited exam of the bladder shows a volume of 204 cc. The prostate is  enlarged and lobulated in shape. It measures about 4 cm diameter.       Impression:          1. Normal appearance of kidneys.  2. Mild prostatic enlargement.     Electronically Signed By-Saniya Dejesus On:9/13/2019 9:01 PM  This report was finalized on [id number] by  Saniya Dejesus .          Xrays, labs reviewed personally by physician.    ECG/EMG Results (most recent)     Procedure Component Value Units Date/Time    Adult Transesophageal Echo (JENNY) W/ Cont if Necessary Per Protocol [[id number]] Collected:  09/13/19 1108     Updated:  09/13/19 1503     BSA 2.1 m^2      EDV(MOD-sp4) 173.7 ml      ESV(MOD-sp4) 99.9 ml      EF(MOD-sp4) 42.5 %      SV(MOD-sp4) 73.8 ml      SI(MOD-sp4) 35.7 ml/m^2      LV Null Vol (BSA corrected) 84.1 ml/m^2      LV Sys Vol (BSA corrected) 48.4 ml/m^2      AI max kathryn 407.9 cm/sec      AI max PG 66.6 mmHg      AI dec slope 44.9 cm/sec^2      AI dec time 9.1 sec      AI P1/2t 2,662 msec       CV ECHO ANALIA - BZI_BMI 28.0 kilograms/m^2       CV ECHO ANALIA - BSA(HAYCOCK) 2.1 m^2       CV ECHO ANALIA - BZI_METRIC_WEIGHT 88.5 kg       CV ECHO ANALIA - BZI_METRIC_HEIGHT 177.8 cm     Narrative:       · Left ventricular systolic function is severely decreased.  · Estimated EF appears to be in the range of less than 20%.  · Moderate aortic valve regurgitation is present.  · Left atrial cavity size is borderline dilated.  · The left ventricular cavity is severely dilated.  · Right ventricular cavity is borderline dilated.  · Mild tricuspid valve regurgitation is present.       ECG 12 Lead [[id number]] Collected:  09/14/19 0300     Updated:  09/14/19 0302    Narrative:       HEART RATE= 67  bpm  RR Interval= 900  ms  NJ Interval= 218  ms  P Horizontal Axis= -76  deg  P Front Axis= -44  deg  QRSD Interval= 185  ms  QT Interval= 476  ms  QRS Axis= -59  deg  T Wave Axis= 105  deg  - ABNORMAL ECG -  Sinus rhythm  Borderline  prolonged HI interval  Left bundle branch block  ST elevation secondary to IVCD  Electronically Signed By:   Date and Time of Study: 2019-09-14 03:00:25          Medication Review:   I have reviewed the patient's current medication list    Current Facility-Administered Medications:   •  acetaminophen (TYLENOL) tablet 650 mg, 650 mg, Oral, Q4H PRN, Griselda Kowalski MD  •  Acetylcysteine capsule 1,200 mg, 1,200 mg, Oral, BID, Abrahan Reno MD, 1,200 mg at 09/14/19 1055  •  aspirin EC tablet 81 mg, 81 mg, Oral, Daily, Griselda Kowalski MD, 81 mg at 09/14/19 1052  •  atorvastatin (LIPITOR) tablet 40 mg, 40 mg, Oral, Nightly, Griselda Kowalski MD, 40 mg at 09/13/19 2123  •  atropine injection 0.5-1 mg, 0.5-1 mg, Intravenous, Q5 Min PRN, Griselda Kowalski MD  •  clopidogrel (PLAVIX) tablet 75 mg, 75 mg, Oral, Daily, Griselda Kowalski MD, 75 mg at 09/14/19 1053  •  famotidine (PEPCID) tablet 20 mg, 20 mg, Oral, Daily, Abrahan Reno MD, 20 mg at 09/14/19 1053  •  hydrALAZINE (APRESOLINE) tablet 50 mg, 50 mg, Oral, Q12H, Abrahan Reno MD, 50 mg at 09/14/19 1054  •  isosorbide mononitrate (IMDUR) 24 hr tablet 30 mg, 30 mg, Oral, Q24H, Abrahan Reno MD, 30 mg at 09/14/19 1054  •  metoprolol tartrate (LOPRESSOR) tablet 25 mg, 25 mg, Oral, BID, Griselda Kowalski MD, 25 mg at 09/14/19 1052      Assessment/Plan     2v CAD  -s/p LHC findings 90% LAD RCA 70%  -CV evaluated, recommended PCI  -high risk PCI planned for Monday  -on ASA and plavix     Chronic HFrEF d/t cardiomyopathy   -on BB  -holding ACE per nephrology  -on imdur and hydralazine     Acute kidney Injury  -resolved  - Cr now 1.1  -nephrology following    HTN  -chronic  -on BB, hydralazine, imdur  -better control now    DVT ppx-SCD    Plan for disposition:pending [facility] on Monday     Andi Garcia,   09/14/19  2:24 PM

## 2019-09-14 NOTE — PROGRESS NOTES
"NEPHROLOGY PROGRESS NOTE    [facility]  [phone]  Ghanshyam Bravo MD      Patient Care Team:  Zev Li MD as PCP - General (Family Medicine)      Provider:  Ghanshyam Bravo MD  Patient Name: Devaughn Valdez  :  1955    SUBJECTIVE:      Medication:    Acetylcysteine 1,200 mg Oral BID   aspirin 81 mg Oral Daily   atorvastatin 40 mg Oral Nightly   clopidogrel 75 mg Oral Daily   famotidine 20 mg Oral Daily   hydrALAZINE 50 mg Oral Q12H   isosorbide mononitrate 30 mg Oral Q24H   metoprolol tartrate 25 mg Oral BID          OBJECTIVE    Vital Sign Min/Max for last 24 hours  Temp  Min: 97.4 °F (36.3 °C)  Max: 100.7 °F (38.2 °C)   BP  Min: 107/58  Max: 162/83   Pulse  Min: 53  Max: 101   Resp  Min: 14  Max: 20   SpO2  Min: 95 %  Max: 99 %   No Data Recorded   Weight  Min: 87 kg (191 lb 12.8 oz)  Max: 89 kg (196 lb 3.4 oz)     Flowsheet Rows      First Filed Value   Admission Height  172.7 cm (68\") Documented at 201928   Admission Weight  87 kg (191 lb 12.8 oz) Documented at 2019 0628          No intake/output data recorded.  I/O last 3 completed shifts:  In: 220 [P.O.:120; I.V.:100]  Out: 1890 [Urine:1890]    Physical Exam:  General Appearance: alert, appears stated age and cooperative  Head: normocephalic, without obvious abnormality and atraumatic  Eyes: conjunctivae and sclerae normal and no icterus  Neck: supple and no JVD  Lungs: clear to auscultation and respirations regular  Heart: regular rhythm & normal rate and normal S1, S2  Chest: Wall no abnormalities observed  Abdomen: normal bowel sounds and soft non-tender  Extremities: moves extremities well, no edema, no cyanosis and no redness  Skin: no bleeding, bruising or rash, turgor normal, color normal and no leasions noted  Neurologic: Alert, and oriented. No focal deficits    Labs:    WBC WBC   Date Value Ref Range Status   2019 10.70 3.40 - 10.80 10*3/mm3 Final   2019 11.10 (H) 3.40 - 10.80 10*3/mm3 " Final      HGB Hemoglobin   Date Value Ref Range Status   09/14/2019 13.6 13.0 - 17.7 g/dL Final   09/13/2019 14.6 13.0 - 17.7 g/dL Final      HCT Hematocrit   Date Value Ref Range Status   09/14/2019 39.9 37.5 - 51.0 % Final   09/13/2019 43.6 37.5 - 51.0 % Final      Platlets No results found for: LABPLAT   MCV MCV   Date Value Ref Range Status   09/14/2019 88.9 79.0 - 97.0 fL Final   09/13/2019 88.9 79.0 - 97.0 fL Final          Sodium Sodium   Date Value Ref Range Status   09/14/2019 138 136 - 144 mmol/L Final   09/13/2019 140 136 - 144 mmol/L Final   09/13/2019 141 136 - 144 mmol/L Final      Potassium Potassium   Date Value Ref Range Status   09/14/2019 3.9 3.6 - 5.1 mmol/L Final   09/13/2019 4.0 3.6 - 5.1 mmol/L Final   09/13/2019 4.6 3.6 - 5.1 mmol/L Final      Chloride Chloride   Date Value Ref Range Status   09/14/2019 108 101 - 111 mmol/L Final   09/13/2019 105 101 - 111 mmol/L Final   09/13/2019 105 101 - 111 mmol/L Final      CO2 CO2   Date Value Ref Range Status   09/14/2019 24.0 22.0 - 32.0 mmol/L Final   09/13/2019 25.0 22.0 - 32.0 mmol/L Final   09/13/2019 27.0 22.0 - 32.0 mmol/L Final      BUN BUN   Date Value Ref Range Status   09/14/2019 17 8 - 20 mg/dL Final   09/13/2019 17 8 - 20 mg/dL Final   09/13/2019 17 8 - 20 mg/dL Final      Creatinine Creatinine   Date Value Ref Range Status   09/14/2019 1.10 0.70 - 1.20 mg/dL Final   09/13/2019 1.30 (H) 0.70 - 1.20 mg/dL Final   09/13/2019 1.30 (H) 0.70 - 1.20 mg/dL Final      Calcium Calcium   Date Value Ref Range Status   09/14/2019 8.3 (L) 8.9 - 10.3 mg/dL Final   09/13/2019 8.8 (L) 8.9 - 10.3 mg/dL Final   09/13/2019 9.1 8.9 - 10.3 mg/dL Final      PO4 No components found for: PO4   Albumin Albumin   Date Value Ref Range Status   09/14/2019 3.00 (L) 3.50 - 4.80 g/dL Final      Magnesium Magnesium   Date Value Ref Range Status   09/14/2019 1.8 1.8 - 2.5 mg/dL Final      Uric Acid No components found for: URIC ACID     Imaging Results (last 72 hours)      Procedure Component Value Units Date/Time    US Renal Bilateral [[id number]] Collected:  09/13/19 2059     Updated:  09/13/19 2103    Narrative:       Examination:      Date of Exam: 9/13/2019 7:45 PM     Indication: acute renal failure; R06.01-Orthopnea; I42.0-Dilated  cardiomyopathy; I50.21-Acute systolic (congestive) heart failure;  I10-Essential (primary) hypertension.     Comparison: None available.     Technique: Grayscale and color Doppler ultrasound evaluation of the  kidneys and urinary bladder was performed     Findings:  The right kidney is about 11 cm in length by 4.7 x 7.5 seen.     The left kidney is about 10.5 cm in length by 4 x 5 cm.        Renal cortical thickness and echogenicity appear within normal. Neither  kidney is hydronephrotic. There is color flow to each kidney.      Limited exam of the bladder shows a volume of 204 cc. The prostate is  enlarged and lobulated in shape. It measures about 4 cm diameter.       Impression:          1. Normal appearance of kidneys.  2. Mild prostatic enlargement.     Electronically Signed By-Saniya Dejesus On:9/13/2019 9:01 PM  This report was finalized on [id number] by  Saniya Dejesus, .          Results for orders placed in visit on 08/15/19   XR Chest PA & Lateral              ASSESSMENT / PLAN      Orthopnea    Essential hypertension    Dilated cardiomyopathy (CMS/HCC)    Acute systolic congestive heart failure (CMS/HCC)    1. MARINA/CKD3--- +Nonoliguric. +very mild ARF/MARINA on top of what appears to be CRF/CKDwith baseline serum creatinine of 1.1mg/dl. Scr currently 1.3mg/dl. +ARF/MARINA secondary to prerenal state/intravascular volume depletion with concomitant use of ACE-I in form of lisinopril. Stop lisinopril. renal artery duplex and PVR. No NSAIDs. Will keep on Mucomyst as patient may need repeat PCI on Monday. Patient was explained the risks of IV dye he was exposed to today and I will given mucomyst and IVF post procedure. Dose meds for  CrCl<30cc/min.     2. CAD--- s/p heart cath . Lesion noted in LAD at bifurcation. PCI planned for Monday      3. VALVULAR HEART DISEASE/CARDIOMYOPATHY--- EF reportedly 20%      4. HTN WITH CKD---- No ACE/ARB/DRI/Diuretics at this time. Agree with addition of Hydralazine for afterload reduction. Will add low dose Imdur too.     5. PUD PROPHYLAXIS--- Start renal dose pepcid.     6. DVT PROPHYLAXIS--- SCD's      Cr better  UA neg, renal US nomal  Will hydrate gently Sunday PM pre-cath planned for Monday   Labs in am      Ghanshyam Bravo MD  [facility]  [phone]  09/14/19  11:39 AM

## 2019-09-14 NOTE — PROGRESS NOTES
"    Reason for follow-up: Coronary artery disease  Cardiomyopathy  Mitral insufficiency  Aortic insufficiency  Hypertension and dyslipidemia     Patient Care Team:  Zev Li MD as PCP - General (Family Medicine)    Subjective .   Feels good     Review of Systems   Constitution: Positive for malaise/fatigue. Negative for fever.   HENT: Negative for congestion and hearing loss.    Eyes: Negative for double vision and visual disturbance.   Cardiovascular: Negative for chest pain, claudication, dyspnea on exertion, leg swelling and syncope.   Respiratory: Negative for cough and shortness of breath.    Endocrine: Negative for cold intolerance.   Skin: Negative for color change and rash.   Musculoskeletal: Negative for arthritis and joint pain.   Gastrointestinal: Negative for abdominal pain and heartburn.   Genitourinary: Negative for hematuria.   Neurological: Negative for excessive daytime sleepiness and dizziness.   Psychiatric/Behavioral: Negative for depression. The patient is not nervous/anxious.    All other systems reviewed and are negative.      Patient has no known allergies.    Scheduled Meds:    Acetylcysteine 1,200 mg Oral BID   aspirin 81 mg Oral Daily   atorvastatin 40 mg Oral Nightly   clopidogrel 75 mg Oral Daily   famotidine 20 mg Oral Daily   hydrALAZINE 50 mg Oral Q12H   isosorbide mononitrate 30 mg Oral Q24H   metoprolol tartrate 25 mg Oral BID     Continuous Infusions:   PRN Meds:.•  acetaminophen  •  atropine    Objective   Looks comfortable sitting in the chair    VITAL SIGNS  Vitals:    09/14/19 0232 09/14/19 0440 09/14/19 0553 09/14/19 1110   BP: 131/63  132/72 131/69   BP Location: Right arm  Right arm    Patient Position: Lying  Lying    Pulse: 73  63 71   Resp: 20  16 18   Temp: 98.6 °F (37 °C)  97.4 °F (36.3 °C)    TempSrc: Oral  Oral    SpO2: 96%  96% 98%   Weight:  89 kg (196 lb 3.4 oz)     Height:           Flowsheet Rows      First Filed Value   Admission Height  172.7 cm (68\") " Documented at 09/13/2019 0628   Admission Weight  87 kg (191 lb 12.8 oz) Documented at 09/13/2019 0628           TELEMETRY: Sinus rhythm    Physical Exam:  Physical Exam   Constitutional: He is oriented to person, place, and time. He appears well-developed and well-nourished. He is cooperative.   HENT:   Head: Normocephalic and atraumatic.   Mouth/Throat: Uvula is midline and oropharynx is clear and moist. No oral lesions.   Eyes: Conjunctivae are normal. No scleral icterus.   Neck: Trachea normal. Neck supple. No JVD present. Carotid bruit is not present. No thyromegaly present.   Cardiovascular: Normal rate, regular rhythm, S1 normal, S2 normal, intact distal pulses and normal pulses. PMI is not displaced. Exam reveals no gallop and no friction rub.   Murmur heard.  Pulmonary/Chest: Effort normal and breath sounds normal.   Abdominal: Soft. Bowel sounds are normal.   Musculoskeletal: Normal range of motion.   Neurological: He is alert and oriented to person, place, and time. He has normal strength.   No focal deficits   Skin: Skin is warm. No cyanosis.   Psychiatric: He has a normal mood and affect.                LAB RESULTS (LAST 7 DAYS)    CBC  Results from last 7 days   Lab Units 09/14/19 0220 09/13/19 1930   WBC 10*3/mm3 10.70 11.10*   RBC 10*6/mm3 4.49 4.91   HEMOGLOBIN g/dL 13.6 14.6   HEMATOCRIT % 39.9 43.6   MCV fL 88.9 88.9   PLATELETS 10*3/mm3 84* 93*       BMP  Results from last 7 days   Lab Units 09/14/19 0220 09/13/19 1930 09/13/19 0609   SODIUM mmol/L 138 140 141   POTASSIUM mmol/L 3.9 4.0 4.6   CHLORIDE mmol/L 108 105 105   CO2 mmol/L 24.0 25.0 27.0   BUN mg/dL 17 17 17   CREATININE mg/dL 1.10 1.30* 1.30*   GLUCOSE mg/dL 123* 165* 102*   MAGNESIUM mg/dL 1.8  --   --    PHOSPHORUS mg/dL 3.0  --   --        CMP   Results from last 7 days   Lab Units 09/14/19 0220 09/13/19 1930 09/13/19 0609   SODIUM mmol/L 138 140 141   POTASSIUM mmol/L 3.9 4.0 4.6   CHLORIDE mmol/L 108 105 105   CO2 mmol/L  24.0 25.0 27.0   BUN mg/dL 17 17 17   CREATININE mg/dL 1.10 1.30* 1.30*   GLUCOSE mg/dL 123* 165* 102*   ALBUMIN g/dL 3.00*  --   --    BILIRUBIN mg/dL 1.3*  --   --    ALK PHOS U/L 44  --   --    AST (SGOT) U/L 21  --   --    ALT (SGPT) U/L 26  --   --          BNP        TROPONIN  Results from last 7 days   Lab Units 09/14/19  0220   CK TOTAL U/L 29*       CoAg  Results from last 7 days   Lab Units 09/13/19  1930   INR  1.03   APTT seconds 25.8       Creatinine Clearance  Estimated Creatinine Clearance: 78.2 mL/min (by C-G formula based on SCr of 1.1 mg/dL).    ABG        Radiology  Us Renal Bilateral    Result Date: 9/13/2019   1. Normal appearance of kidneys. 2. Mild prostatic enlargement.  Electronically Signed By-Saniya Dejesus On:9/13/2019 9:01 PM This report was finalized on [id number] by  Saniya Dejesus, .            EKG    I personally viewed and interpreted the patient's EKG/Telemetry data:    ECHOCARDIOGRAM:    Results for orders placed during the hospital encounter of 09/13/19   Adult Transesophageal Echo (JENNY) W/ Cont if Necessary Per Protocol    Narrative · Left ventricular systolic function is severely decreased.  · Estimated EF appears to be in the range of less than 20%.  · Moderate aortic valve regurgitation is present.  · Left atrial cavity size is borderline dilated.  · The left ventricular cavity is severely dilated.  · Right ventricular cavity is borderline dilated.  · Mild tricuspid valve regurgitation is present.        STRESS MYOVIEW:    CARDIAC CATHETERIZATION:    OTHER:         Assessment/Plan       Orthopnea    Essential hypertension    Dilated cardiomyopathy (CMS/HCC)    Acute systolic congestive heart failure (CMS/HCC)    Coronary artery disease involving native coronary artery of native heart with angina pectoris (CMS/HCC)    Non-rheumatic mitral regurgitation    Nonrheumatic aortic valve insufficiency    Patient underwent cardiac catheter showed two-vessel CAD  Does have underlying  renal failure valvular disease  Patient was seen by CV surgery and was advised PCI   scheduled for Monday      I discussed the patients findings and my recommendations with patient and family at bedside    Riley Correia MD  09/14/19  1:45 PM               Patient

## 2019-09-15 LAB
ALBUMIN SERPL-MCNC: 3.2 G/DL (ref 3.5–4.8)
ANION GAP SERPL CALCULATED.3IONS-SCNC: 11.9 MMOL/L (ref 5–15)
BASOPHILS # BLD AUTO: 0 10*3/MM3 (ref 0–0.2)
BASOPHILS NFR BLD AUTO: 0.4 % (ref 0–1.5)
BUN BLD-MCNC: 17 MG/DL (ref 8–20)
BUN/CREAT SERPL: 17 (ref 6.2–20.3)
CALCIUM SPEC-SCNC: 8.5 MG/DL (ref 8.9–10.3)
CHLORIDE SERPL-SCNC: 104 MMOL/L (ref 101–111)
CO2 SERPL-SCNC: 23 MMOL/L (ref 22–32)
CREAT BLD-MCNC: 1 MG/DL (ref 0.7–1.2)
DEPRECATED RDW RBC AUTO: 43.3 FL (ref 37–54)
EOSINOPHIL # BLD AUTO: 0.2 10*3/MM3 (ref 0–0.4)
EOSINOPHIL NFR BLD AUTO: 2.6 % (ref 0.3–6.2)
ERYTHROCYTE [DISTWIDTH] IN BLOOD BY AUTOMATED COUNT: 14 % (ref 12.3–15.4)
GFR SERPL CREATININE-BSD FRML MDRD: 76 ML/MIN/1.73
GLUCOSE BLD-MCNC: 106 MG/DL (ref 65–99)
HCT VFR BLD AUTO: 38.6 % (ref 37.5–51)
HGB BLD-MCNC: 13.1 G/DL (ref 13–17.7)
LYMPHOCYTES # BLD AUTO: 2 10*3/MM3 (ref 0.7–3.1)
LYMPHOCYTES NFR BLD AUTO: 29.1 % (ref 19.6–45.3)
MAGNESIUM SERPL-MCNC: 1.9 MG/DL (ref 1.8–2.5)
MCH RBC QN AUTO: 30.1 PG (ref 26.6–33)
MCHC RBC AUTO-ENTMCNC: 34 G/DL (ref 31.5–35.7)
MCV RBC AUTO: 88.6 FL (ref 79–97)
MONOCYTES # BLD AUTO: 0.7 10*3/MM3 (ref 0.1–0.9)
MONOCYTES NFR BLD AUTO: 10.6 % (ref 5–12)
NEUTROPHILS # BLD AUTO: 4 10*3/MM3 (ref 1.7–7)
NEUTROPHILS NFR BLD AUTO: 57.3 % (ref 42.7–76)
NRBC BLD AUTO-RTO: 0 /100 WBC (ref 0–0.2)
PHOSPHATE SERPL-MCNC: 3.5 MG/DL (ref 2.4–4.7)
PLATELET # BLD AUTO: 79 10*3/MM3 (ref 140–450)
PMV BLD AUTO: 11.3 FL (ref 6–12)
POTASSIUM BLD-SCNC: 3.9 MMOL/L (ref 3.6–5.1)
RBC # BLD AUTO: 4.36 10*6/MM3 (ref 4.14–5.8)
SODIUM BLD-SCNC: 135 MMOL/L (ref 136–144)
WBC NRBC COR # BLD: 6.9 10*3/MM3 (ref 3.4–10.8)

## 2019-09-15 PROCEDURE — 99232 SBSQ HOSP IP/OBS MODERATE 35: CPT | Performed by: INTERNAL MEDICINE

## 2019-09-15 PROCEDURE — 85025 COMPLETE CBC W/AUTO DIFF WBC: CPT | Performed by: INTERNAL MEDICINE

## 2019-09-15 PROCEDURE — 83735 ASSAY OF MAGNESIUM: CPT | Performed by: INTERNAL MEDICINE

## 2019-09-15 PROCEDURE — 80069 RENAL FUNCTION PANEL: CPT | Performed by: INTERNAL MEDICINE

## 2019-09-15 RX ORDER — SODIUM CHLORIDE 9 MG/ML
75 INJECTION, SOLUTION INTRAVENOUS CONTINUOUS
Status: DISCONTINUED | OUTPATIENT
Start: 2019-09-16 | End: 2019-09-15

## 2019-09-15 RX ORDER — SODIUM CHLORIDE 9 MG/ML
60 INJECTION, SOLUTION INTRAVENOUS CONTINUOUS
Status: DISCONTINUED | OUTPATIENT
Start: 2019-09-16 | End: 2019-09-17

## 2019-09-15 RX ADMIN — FAMOTIDINE 20 MG: 20 TABLET, FILM COATED ORAL at 09:36

## 2019-09-15 RX ADMIN — HYDRALAZINE HYDROCHLORIDE 50 MG: 25 TABLET, FILM COATED ORAL at 20:00

## 2019-09-15 RX ADMIN — Medication 1200 MG: at 09:37

## 2019-09-15 RX ADMIN — HYDRALAZINE HYDROCHLORIDE 50 MG: 25 TABLET, FILM COATED ORAL at 09:36

## 2019-09-15 RX ADMIN — ISOSORBIDE MONONITRATE 30 MG: 30 TABLET, EXTENDED RELEASE ORAL at 09:36

## 2019-09-15 RX ADMIN — Medication 1200 MG: at 20:01

## 2019-09-15 RX ADMIN — METOPROLOL TARTRATE 25 MG: 25 TABLET, FILM COATED ORAL at 20:00

## 2019-09-15 RX ADMIN — METOPROLOL TARTRATE 25 MG: 25 TABLET, FILM COATED ORAL at 09:36

## 2019-09-15 RX ADMIN — CLOPIDOGREL BISULFATE 75 MG: 75 TABLET ORAL at 09:36

## 2019-09-15 RX ADMIN — ACETAMINOPHEN 650 MG: 325 TABLET ORAL at 14:42

## 2019-09-15 RX ADMIN — ATORVASTATIN CALCIUM 40 MG: 40 TABLET, FILM COATED ORAL at 20:00

## 2019-09-15 RX ADMIN — ASPIRIN 81 MG: 81 TABLET, DELAYED RELEASE ORAL at 09:36

## 2019-09-15 NOTE — PROGRESS NOTES
"    Reason for follow-up: Coronary artery disease  Cardiomyopathy  Mitral insufficiency  Aortic insufficiency  Hypertension and dyslipidemia     Patient Care Team:  Zev Li MD as PCP - General (Family Medicine)    Subjective .   Feels good     Review of Systems   Constitution: Positive for malaise/fatigue. Negative for fever.   HENT: Negative for congestion and hearing loss.    Eyes: Negative for double vision and visual disturbance.   Cardiovascular: Negative for chest pain, claudication, dyspnea on exertion, leg swelling and syncope.   Respiratory: Negative for cough and shortness of breath.    Endocrine: Negative for cold intolerance.   Skin: Negative for color change and rash.   Musculoskeletal: Negative for arthritis and joint pain.   Gastrointestinal: Negative for abdominal pain and heartburn.   Genitourinary: Negative for hematuria.   Neurological: Negative for excessive daytime sleepiness and dizziness.   Psychiatric/Behavioral: Negative for depression. The patient is not nervous/anxious.    All other systems reviewed and are negative.      Patient has no known allergies.    Scheduled Meds:    Acetylcysteine 1,200 mg Oral BID   aspirin 81 mg Oral Daily   atorvastatin 40 mg Oral Nightly   clopidogrel 75 mg Oral Daily   famotidine 20 mg Oral Daily   hydrALAZINE 50 mg Oral Q12H   isosorbide mononitrate 30 mg Oral Q24H   metoprolol tartrate 25 mg Oral BID     Continuous Infusions:    [START ON 9/16/2019] sodium chloride 75 mL/hr     PRN Meds:.•  acetaminophen  •  atropine    Objective   Looks comfortable sitting in the chair    VITAL SIGNS  Vitals:    09/15/19 0925 09/15/19 0930 09/15/19 0935 09/15/19 1116   BP:   151/72 127/77   Pulse: 80 85 81 67   Resp:    16   Temp:    98.1 °F (36.7 °C)   TempSrc:    Oral   SpO2:    100%   Weight:       Height:           Flowsheet Rows      First Filed Value   Admission Height  172.7 cm (68\") Documented at 09/13/2019 0628   Admission Weight  87 kg (191 lb 12.8 oz) " Documented at 09/13/2019 0628           TELEMETRY: Sinus rhythm    Physical Exam:  Physical Exam   Constitutional: He is oriented to person, place, and time. He appears well-developed and well-nourished. He is cooperative.   HENT:   Head: Normocephalic and atraumatic.   Mouth/Throat: Uvula is midline and oropharynx is clear and moist. No oral lesions.   Eyes: Conjunctivae are normal. No scleral icterus.   Neck: Trachea normal. Neck supple. No JVD present. Carotid bruit is not present. No thyromegaly present.   Cardiovascular: Normal rate, regular rhythm, S1 normal, S2 normal, intact distal pulses and normal pulses. PMI is not displaced. Exam reveals no gallop and no friction rub.   Murmur heard.  Pulmonary/Chest: Effort normal and breath sounds normal.   Abdominal: Soft. Bowel sounds are normal.   Musculoskeletal: Normal range of motion.   Neurological: He is alert and oriented to person, place, and time. He has normal strength.   No focal deficits   Skin: Skin is warm. No cyanosis.   Psychiatric: He has a normal mood and affect.                LAB RESULTS (LAST 7 DAYS)    CBC  Results from last 7 days   Lab Units 09/15/19  0213 09/14/19  0220 09/13/19  1930   WBC 10*3/mm3 6.90 10.70 11.10*   RBC 10*6/mm3 4.36 4.49 4.91   HEMOGLOBIN g/dL 13.1 13.6 14.6   HEMATOCRIT % 38.6 39.9 43.6   MCV fL 88.6 88.9 88.9   PLATELETS 10*3/mm3 79* 84* 93*       BMP  Results from last 7 days   Lab Units 09/15/19  0213 09/14/19  0220 09/13/19  1930 09/13/19  0609   SODIUM mmol/L 135* 138 140 141   POTASSIUM mmol/L 3.9 3.9 4.0 4.6   CHLORIDE mmol/L 104 108 105 105   CO2 mmol/L 23.0 24.0 25.0 27.0   BUN mg/dL 17 17 17 17   CREATININE mg/dL 1.00 1.10 1.30* 1.30*   GLUCOSE mg/dL 106* 123* 165* 102*   MAGNESIUM mg/dL 1.9 1.8  --   --    PHOSPHORUS mg/dL 3.5 3.0  --   --        CMP   Results from last 7 days   Lab Units 09/15/19  0213 09/14/19  0220 09/13/19  1930 09/13/19  0609   SODIUM mmol/L 135* 138 140 141   POTASSIUM mmol/L 3.9 3.9 4.0  4.6   CHLORIDE mmol/L 104 108 105 105   CO2 mmol/L 23.0 24.0 25.0 27.0   BUN mg/dL 17 17 17 17   CREATININE mg/dL 1.00 1.10 1.30* 1.30*   GLUCOSE mg/dL 106* 123* 165* 102*   ALBUMIN g/dL 3.20* 3.00*  --   --    BILIRUBIN mg/dL  --  1.3*  --   --    ALK PHOS U/L  --  44  --   --    AST (SGOT) U/L  --  21  --   --    ALT (SGPT) U/L  --  26  --   --          BNP        TROPONIN  Results from last 7 days   Lab Units 09/14/19  0220   CK TOTAL U/L 29*       CoAg  Results from last 7 days   Lab Units 09/13/19  1930   INR  1.03   APTT seconds 25.8       Creatinine Clearance  Estimated Creatinine Clearance: 86.1 mL/min (by C-G formula based on SCr of 1 mg/dL).    ABG        Radiology  Us Renal Bilateral    Result Date: 9/13/2019   1. Normal appearance of kidneys. 2. Mild prostatic enlargement.  Electronically Signed By-Saniya Dejesus On:9/13/2019 9:01 PM This report was finalized on [id number] by  Saniya Dejesus, .            EKG    I personally viewed and interpreted the patient's EKG/Telemetry data:    ECHOCARDIOGRAM:    Results for orders placed during the hospital encounter of 09/13/19   Adult Transesophageal Echo (JENNY) W/ Cont if Necessary Per Protocol    Narrative · Left ventricular systolic function is severely decreased.  · Estimated EF appears to be in the range of less than 20%.  · Moderate aortic valve regurgitation is present.  · Left atrial cavity size is borderline dilated.  · The left ventricular cavity is severely dilated.  · Right ventricular cavity is borderline dilated.  · Mild tricuspid valve regurgitation is present.        STRESS MYOVIEW:    CARDIAC CATHETERIZATION:    OTHER:         Assessment/Plan       Orthopnea    Essential hypertension    Dilated cardiomyopathy (CMS/HCC)    Acute systolic congestive heart failure (CMS/HCC)    Coronary artery disease involving native coronary artery of native heart with angina pectoris (CMS/HCC)    Non-rheumatic mitral regurgitation    Nonrheumatic aortic valve  insufficiency    Patient underwent cardiac catheter showed two-vessel CAD  Does have underlying renal failure valvular disease  Patient was seen by CV surgery and was advised PCI   scheduled for Monday      I discussed the patients findings and my recommendations with patient and family at bedside    Riley Correia MD  09/15/19  2:06 PM

## 2019-09-15 NOTE — PLAN OF CARE
Problem: Patient Care Overview  Goal: Plan of Care Review  Outcome: Ongoing (interventions implemented as appropriate)   09/15/19 0018   Coping/Psychosocial   Plan of Care Reviewed With patient   OTHER   Outcome Summary patient has no voiced complaint. plan is for him to have a cardiac cath on monday.       Problem: Cardiac: Heart Failure (Adult)  Goal: Signs and Symptoms of Listed Potential Problems Will be Absent, Minimized or Managed (Cardiac: Heart Failure)  Outcome: Ongoing (interventions implemented as appropriate)

## 2019-09-15 NOTE — PROGRESS NOTES
"NEPHROLOGY PROGRESS NOTE    [facility]  [phone]  Ghanshyam Bravo MD      Patient Care Team:  Zev Li MD as PCP - General (Family Medicine)      Provider:  Ghanshyam Bravo MD  Patient Name: Devaughn Valdez  :  1955    SUBJECTIVE:  F/u MARINA/CKD  No chest pain, dyspnea, dysuria or vomiting or diarrhea    Medication:    Acetylcysteine 1,200 mg Oral BID   aspirin 81 mg Oral Daily   atorvastatin 40 mg Oral Nightly   clopidogrel 75 mg Oral Daily   famotidine 20 mg Oral Daily   hydrALAZINE 50 mg Oral Q12H   isosorbide mononitrate 30 mg Oral Q24H   metoprolol tartrate 25 mg Oral BID          OBJECTIVE    Vital Sign Min/Max for last 24 hours  Temp  Min: 97.8 °F (36.6 °C)  Max: 98.6 °F (37 °C)   BP  Min: 116/66  Max: 145/76   Pulse  Min: 59  Max: 72   Resp  Min: 16  Max: 20   SpO2  Min: 93 %  Max: 100 %   No Data Recorded   Weight  Min: 89.2 kg (196 lb 10.4 oz)  Max: 89.2 kg (196 lb 10.4 oz)     Flowsheet Rows      First Filed Value   Admission Height  172.7 cm (68\") Documented at 2019   Admission Weight  87 kg (191 lb 12.8 oz) Documented at 2019 0628          No intake/output data recorded.  I/O last 3 completed shifts:  In: 220 [P.O.:120; I.V.:100]  Out:  [Urine:]    Physical Exam:  General Appearance: alert, appears stated age and cooperative  Head: normocephalic, without obvious abnormality and atraumatic  Eyes: conjunctivae and sclerae normal and no icterus  Neck: supple and no JVD  Lungs: clear to auscultation and respirations regular  Heart: regular rhythm & normal rate and normal S1, S2  Chest: Wall no abnormalities observed  Abdomen: normal bowel sounds and soft non-tender  Extremities: moves extremities well, no edema, no cyanosis and no redness  Skin: no bleeding, bruising or rash, turgor normal, color normal and no leasions noted  Neurologic: Alert, and oriented. No focal deficits    Labs:    WBC WBC   Date Value Ref Range Status   09/15/2019 6.90 3.40 " - 10.80 10*3/mm3 Final   09/14/2019 10.70 3.40 - 10.80 10*3/mm3 Final   09/13/2019 11.10 (H) 3.40 - 10.80 10*3/mm3 Final      HGB Hemoglobin   Date Value Ref Range Status   09/15/2019 13.1 13.0 - 17.7 g/dL Final   09/14/2019 13.6 13.0 - 17.7 g/dL Final   09/13/2019 14.6 13.0 - 17.7 g/dL Final      HCT Hematocrit   Date Value Ref Range Status   09/15/2019 38.6 37.5 - 51.0 % Final   09/14/2019 39.9 37.5 - 51.0 % Final   09/13/2019 43.6 37.5 - 51.0 % Final      Platlets No results found for: LABPLAT   MCV MCV   Date Value Ref Range Status   09/15/2019 88.6 79.0 - 97.0 fL Final   09/14/2019 88.9 79.0 - 97.0 fL Final   09/13/2019 88.9 79.0 - 97.0 fL Final          Sodium Sodium   Date Value Ref Range Status   09/15/2019 135 (L) 136 - 144 mmol/L Final   09/14/2019 138 136 - 144 mmol/L Final   09/13/2019 140 136 - 144 mmol/L Final   09/13/2019 141 136 - 144 mmol/L Final      Potassium Potassium   Date Value Ref Range Status   09/15/2019 3.9 3.6 - 5.1 mmol/L Final   09/14/2019 3.9 3.6 - 5.1 mmol/L Final   09/13/2019 4.0 3.6 - 5.1 mmol/L Final   09/13/2019 4.6 3.6 - 5.1 mmol/L Final      Chloride Chloride   Date Value Ref Range Status   09/15/2019 104 101 - 111 mmol/L Final   09/14/2019 108 101 - 111 mmol/L Final   09/13/2019 105 101 - 111 mmol/L Final   09/13/2019 105 101 - 111 mmol/L Final      CO2 CO2   Date Value Ref Range Status   09/15/2019 23.0 22.0 - 32.0 mmol/L Final   09/14/2019 24.0 22.0 - 32.0 mmol/L Final   09/13/2019 25.0 22.0 - 32.0 mmol/L Final   09/13/2019 27.0 22.0 - 32.0 mmol/L Final      BUN BUN   Date Value Ref Range Status   09/15/2019 17 8 - 20 mg/dL Final   09/14/2019 17 8 - 20 mg/dL Final   09/13/2019 17 8 - 20 mg/dL Final   09/13/2019 17 8 - 20 mg/dL Final      Creatinine Creatinine   Date Value Ref Range Status   09/15/2019 1.00 0.70 - 1.20 mg/dL Final   09/14/2019 1.10 0.70 - 1.20 mg/dL Final   09/13/2019 1.30 (H) 0.70 - 1.20 mg/dL Final   09/13/2019 1.30 (H) 0.70 - 1.20 mg/dL Final      Calcium  Calcium   Date Value Ref Range Status   09/15/2019 8.5 (L) 8.9 - 10.3 mg/dL Final   09/14/2019 8.3 (L) 8.9 - 10.3 mg/dL Final   09/13/2019 8.8 (L) 8.9 - 10.3 mg/dL Final   09/13/2019 9.1 8.9 - 10.3 mg/dL Final      PO4 No components found for: PO4   Albumin Albumin   Date Value Ref Range Status   09/15/2019 3.20 (L) 3.50 - 4.80 g/dL Final   09/14/2019 3.00 (L) 3.50 - 4.80 g/dL Final      Magnesium Magnesium   Date Value Ref Range Status   09/15/2019 1.9 1.8 - 2.5 mg/dL Final   09/14/2019 1.8 1.8 - 2.5 mg/dL Final      Uric Acid No components found for: URIC ACID     Imaging Results (last 72 hours)     Procedure Component Value Units Date/Time    US Renal Bilateral [[id number]] Collected:  09/13/19 2059     Updated:  09/13/19 2103    Narrative:       Examination:      Date of Exam: 9/13/2019 7:45 PM     Indication: acute renal failure; R06.01-Orthopnea; I42.0-Dilated  cardiomyopathy; I50.21-Acute systolic (congestive) heart failure;  I10-Essential (primary) hypertension.     Comparison: None available.     Technique: Grayscale and color Doppler ultrasound evaluation of the  kidneys and urinary bladder was performed     Findings:  The right kidney is about 11 cm in length by 4.7 x 7.5 seen.     The left kidney is about 10.5 cm in length by 4 x 5 cm.        Renal cortical thickness and echogenicity appear within normal. Neither  kidney is hydronephrotic. There is color flow to each kidney.      Limited exam of the bladder shows a volume of 204 cc. The prostate is  enlarged and lobulated in shape. It measures about 4 cm diameter.       Impression:          1. Normal appearance of kidneys.  2. Mild prostatic enlargement.     Electronically Signed By-Saniya Dejesus On:9/13/2019 9:01 PM  This report was finalized on [id number] by  Saniya Dejesus, .          Results for orders placed in visit on 08/15/19   XR Chest PA & Lateral       Results for orders placed during the hospital encounter of 09/13/19   Duplex Renal Artery -  Bilateral Complete CAR    Narrative · Normal right renal artery.  · Normal left renal artery.            ASSESSMENT / PLAN      Orthopnea    Essential hypertension    Dilated cardiomyopathy (CMS/HCC)    Acute systolic congestive heart failure (CMS/HCC)    Coronary artery disease involving native coronary artery of native heart with angina pectoris (CMS/HCC)    Non-rheumatic mitral regurgitation    Nonrheumatic aortic valve insufficiency    1. MARINA/CKD3--- +Nonoliguric. +very mild ARF/MARINA on top of what appears to be CRF/CKDwith baseline serum creatinine of 1.1mg/dl. Scr currently 1.3mg/dl. +ARF/MARINA secondary to prerenal state/intravascular volume depletion with concomitant use of ACE-I in form of lisinopril. Stop lisinopril. renal artery duplex and PVR. No NSAIDs. Will keep on Mucomyst as patient may need repeat PCI on Monday. Patient was explained the risks of IV dye he was exposed to today and I will given mucomyst and IVF post procedure. Dose meds for CrCl<30cc/min.     2. CAD--- s/p heart cath . Lesion noted in LAD at bifurcation. PCI planned for Monday      3. VALVULAR HEART DISEASE/CARDIOMYOPATHY--- EF reportedly 20%      4. HTN WITH CKD---- No ACE/ARB/DRI/Diuretics at this time. Agree with addition of Hydralazine for afterload reduction. Will add low dose Imdur too.     5. PUD PROPHYLAXIS--- Start renal dose pepcid.     6. DVT PROPHYLAXIS--- SCD's      Cr better  UA neg, renal US nomal  Will hydrate gently Sunday PM pre-cath planned for tomorrow        Ghanshyam Bravo MD  [facility]  [phone]  09/15/19  5:54 AM

## 2019-09-15 NOTE — PROGRESS NOTES
"Hospitalist Team      Chief Complaint:  Follow up cardiomyopathy   HPI:  64 y.o. male with recent diagnosis of severe dilated cardiomyopathy and severe LV dysfunction EF 20% who was brought in for left heart catheterization. He was found to have severe two-vessel disease. Patient was admitted to the PCU for further evaluation by CT surgery to determine if patient is a candidate for surgery versus high risk PCI to LAD and right coronary artery. Patient also has some acute kidney injury with creatinine 1.3 and nephrology was consulted. Pt was admitted to the hospitalist group. Pt denied any current complaints on exam. He stated he was referred to Dr. Kowalski recently by PCP Dr. Li for worsening shortness of breath. He then had the echo that showed the cardiomyopathy. He was started on lisinopril and metoprolol, which he has been taking.       Subjective      Setting in chair. No new issues. No chest pain, shortness of breath, no leg swelling. Anxious about heart cath tomorrow  Review of Systems   Respiratory: Negative for shortness of breath.    Cardiovascular: Negative for chest pain.       Objective    Vital Signs  Temp:  [97.8 °F (36.6 °C)-98.6 °F (37 °C)] 98.1 °F (36.7 °C)  Heart Rate:  [59-85] 67  Resp:  [16-20] 16  BP: (116-151)/(53-77) 127/77  Oxygen Therapy  SpO2: 100 %  Pulse Oximetry Type: Intermittent  Device (Oxygen Therapy): room air  Flowsheet Rows      First Filed Value   Admission Height  172.7 cm (68\") Documented at 09/13/2019 0628   Admission Weight  87 kg (191 lb 12.8 oz) Documented at 09/13/2019 0628        Intake & Output (last 3 days)       09/12 0701 - 09/13 0700 09/13 0701 - 09/14 0700 09/14 0701 - 09/15 0700 09/15 0701 - 09/16 0700    P.O.  120      I.V. (mL/kg)  100 (1.1)      Total Intake(mL/kg)  220 (2.5)      Urine (mL/kg/hr)  1890 (0.9) 150 (0.1)     Total Output  1890 150     Net  -1670 -150             Urine Unmeasured Occurrence   5 x 3 x        Lines, Drains & Airways    Active " LDAs     Name:   Placement date:   Placement time:   Site:   Days:    Peripheral IV 09/13/19 0645 Left Antecubital   09/13/19 0645    Antecubital   1                Physical Exam:    Gen: NAD  HEENT: EOMI  Heart: RRR, no murmur  Lung: CTA b/l, adequate air movement  ABD: soft, NT  MSK: moves ext spontaneously  Neuro: AO x 3  Psych: no anxiety, no depression  Skin: warm, dry, intact  Extremities:  No edema    Results Review:     I reviewed the patient's new clinical results.    Results from last 7 days   Lab Units 09/15/19  0213 09/14/19  0220 09/13/19  1930   WBC 10*3/mm3 6.90 10.70 11.10*   HEMOGLOBIN g/dL 13.1 13.6 14.6   HEMATOCRIT % 38.6 39.9 43.6   PLATELETS 10*3/mm3 79* 84* 93*     Results from last 7 days   Lab Units 09/15/19  0213 09/14/19  0220 09/13/19  1930   SODIUM mmol/L 135* 138 140   POTASSIUM mmol/L 3.9 3.9 4.0   CHLORIDE mmol/L 104 108 105   CO2 mmol/L 23.0 24.0 25.0   BUN mg/dL 17 17 17   CREATININE mg/dL 1.00 1.10 1.30*   CALCIUM mg/dL 8.5* 8.3* 8.8*   BILIRUBIN mg/dL  --  1.3*  --    ALK PHOS U/L  --  44  --    ALT (SGPT) U/L  --  26  --    AST (SGOT) U/L  --  21  --    GLUCOSE mg/dL 106* 123* 165*     Results from last 7 days   Lab Units 09/15/19  0213 09/14/19  0220   MAGNESIUM mg/dL 1.9 1.8                 @lastrespira@                   Imaging Results (last 24 hours)     ** No results found for the last 24 hours. **          Xrays, labs reviewed personally by physician.    ECG/EMG Results (most recent)     Procedure Component Value Units Date/Time    Adult Transesophageal Echo (JENNY) W/ Cont if Necessary Per Protocol [[id number]] Collected:  09/13/19 1108     Updated:  09/13/19 1503     BSA 2.1 m^2      EDV(MOD-sp4) 173.7 ml      ESV(MOD-sp4) 99.9 ml      EF(MOD-sp4) 42.5 %      SV(MOD-sp4) 73.8 ml      SI(MOD-sp4) 35.7 ml/m^2      LV Null Vol (BSA corrected) 84.1 ml/m^2      LV Sys Vol (BSA corrected) 48.4 ml/m^2      AI max kathryn 407.9 cm/sec      AI max PG 66.6 mmHg      AI dec slope 44.9  cm/sec^2      AI dec time 9.1 sec      AI P1/2t 2,662 msec       CV ECHO ANALIA - BZI_BMI 28.0 kilograms/m^2       CV ECHO ANALIA - BSA(HAYCOCK) 2.1 m^2       CV ECHO ANALIA - BZI_METRIC_WEIGHT 88.5 kg       CV ECHO ANALIA - BZI_METRIC_HEIGHT 177.8 cm     Narrative:       · Left ventricular systolic function is severely decreased.  · Estimated EF appears to be in the range of less than 20%.  · Moderate aortic valve regurgitation is present.  · Left atrial cavity size is borderline dilated.  · The left ventricular cavity is severely dilated.  · Right ventricular cavity is borderline dilated.  · Mild tricuspid valve regurgitation is present.       ECG 12 Lead [[id number]] Collected:  09/14/19 0300     Updated:  09/14/19 0302    Narrative:       HEART RATE= 67  bpm  RR Interval= 900  ms  TN Interval= 218  ms  P Horizontal Axis= -76  deg  P Front Axis= -44  deg  QRSD Interval= 185  ms  QT Interval= 476  ms  QRS Axis= -59  deg  T Wave Axis= 105  deg  - ABNORMAL ECG -  Sinus rhythm  Borderline prolonged TN interval  Left bundle branch block  ST elevation secondary to IVCD  Electronically Signed By:   Date and Time of Study: 2019-09-14 03:00:25          Medication Review:   I have reviewed the patient's current medication list    Current Facility-Administered Medications:   •  acetaminophen (TYLENOL) tablet 650 mg, 650 mg, Oral, Q4H PRN, Griselda Kowalski MD, 650 mg at 09/14/19 1810  •  Acetylcysteine capsule 1,200 mg, 1,200 mg, Oral, BID, Abrahan Reno MD, 1,200 mg at 09/15/19 0937  •  aspirin EC tablet 81 mg, 81 mg, Oral, Daily, Griselda Kowalski MD, 81 mg at 09/15/19 0936  •  atorvastatin (LIPITOR) tablet 40 mg, 40 mg, Oral, Nightly, Griselda Kowalski MD, 40 mg at 09/14/19 2026  •  atropine injection 0.5-1 mg, 0.5-1 mg, Intravenous, Q5 Min PRN, Griselda Kowalski MD  •  clopidogrel (PLAVIX) tablet 75 mg, 75 mg, Oral, Daily, Griselda Kowalski MD, 75 mg at 09/15/19 9315  •   famotidine (PEPCID) tablet 20 mg, 20 mg, Oral, Daily, Abrahan Reno MD, 20 mg at 09/15/19 0936  •  hydrALAZINE (APRESOLINE) tablet 50 mg, 50 mg, Oral, Q12H, Abrahan Reno MD, 50 mg at 09/15/19 0936  •  isosorbide mononitrate (IMDUR) 24 hr tablet 30 mg, 30 mg, Oral, Q24H, Abrahan Reno MD, 30 mg at 09/15/19 0936  •  metoprolol tartrate (LOPRESSOR) tablet 25 mg, 25 mg, Oral, BID, Griselda Kowalski MD, 25 mg at 09/15/19 0936  •  [START ON 9/16/2019] sodium chloride 0.9 % infusion, 75 mL/hr, Intravenous, Continuous, Ghanshyam Bravo MD      Assessment/Plan     2v CAD  -s/p C findings 90% LAD RCA 70%  -CV evaluated, recommended PCI  -high risk PCI planned for Monday  -on ASA and plavix     Chronic HFrEF d/t cardiomyopathy   -echo EF 20%  -on BB  -holding ACE per nephrology  -C/w imdur and hydralazine     Acute kidney Injury  -resolved, pre-[facility] protocol planned   - Cr now 1.0  -nephrology following    HTN  -chronic  -on BB, hydralazine, imdur  -better control now    Thrombocytopenia  -unclear etiology, but appears chronic   -platelets 80, were 110 before admit  -monitor    DVT ppx-SCD    Plan for disposition:pending [facility] on Monday     Andi Garcia DO  09/15/19  1:14 PM

## 2019-09-15 NOTE — PLAN OF CARE
Problem: Patient Care Overview  Goal: Plan of Care Review  Outcome: Ongoing (interventions implemented as appropriate)   09/15/19 9541   Coping/Psychosocial   Plan of Care Reviewed With spouse   OTHER   Outcome Summary medicated for headache; plan for heart cath in am; sent secure chat to all physicians regarding platelets dropping.       Problem: Hypertensive Disease/Crisis (Arterial) (Adult)  Goal: Signs and Symptoms of Listed Potential Problems Will be Absent, Minimized or Managed (Hypertensive Disease/Crisis)  Outcome: Outcome(s) achieved Date Met: 09/15/19      Problem: Cardiac: Heart Failure (Adult)  Goal: Signs and Symptoms of Listed Potential Problems Will be Absent, Minimized or Managed (Cardiac: Heart Failure)  Outcome: Ongoing (interventions implemented as appropriate)

## 2019-09-16 LAB
ACT BLD: 147 SECONDS (ref 89–137)
ACT BLD: 175 SECONDS (ref 89–137)
ACT BLD: 230 SECONDS (ref 89–137)
ALBUMIN SERPL-MCNC: 3.2 G/DL (ref 2.9–4.4)
ALBUMIN SERPL-MCNC: 3.3 G/DL (ref 3.5–4.8)
ALBUMIN/GLOB SERPL: 1 {RATIO} (ref 0.7–1.7)
ALPHA1 GLOB FLD ELPH-MCNC: 0.2 G/DL (ref 0–0.4)
ALPHA2 GLOB SERPL ELPH-MCNC: 0.7 G/DL (ref 0.4–1)
ANION GAP SERPL CALCULATED.3IONS-SCNC: 11.8 MMOL/L (ref 5–15)
APTT PPP: 25.3 SECONDS (ref 24–31)
B-GLOBULIN SERPL ELPH-MCNC: 1.1 G/DL (ref 0.7–1.3)
BASOPHILS # BLD AUTO: 0 10*3/MM3 (ref 0–0.2)
BASOPHILS NFR BLD AUTO: 0.3 % (ref 0–1.5)
BUN BLD-MCNC: 15 MG/DL (ref 8–20)
BUN/CREAT SERPL: 13.6 (ref 6.2–20.3)
CALCIUM SPEC-SCNC: 8.7 MG/DL (ref 8.9–10.3)
CHLORIDE SERPL-SCNC: 110 MMOL/L (ref 101–111)
CO2 SERPL-SCNC: 22 MMOL/L (ref 22–32)
CREAT BLD-MCNC: 1.1 MG/DL (ref 0.7–1.2)
DEPRECATED RDW RBC AUTO: 43.3 FL (ref 37–54)
EOSINOPHIL # BLD AUTO: 0.2 10*3/MM3 (ref 0–0.4)
EOSINOPHIL NFR BLD AUTO: 2.8 % (ref 0.3–6.2)
ERYTHROCYTE [DISTWIDTH] IN BLOOD BY AUTOMATED COUNT: 14 % (ref 12.3–15.4)
GAMMA GLOB SERPL ELPH-MCNC: 1.2 G/DL (ref 0.4–1.8)
GFR SERPL CREATININE-BSD FRML MDRD: 68 ML/MIN/1.73
GLOBULIN SER CALC-MCNC: 3.2 G/DL (ref 2.2–3.9)
GLUCOSE BLD-MCNC: 112 MG/DL (ref 65–99)
HCT VFR BLD AUTO: 39 % (ref 37.5–51)
HGB BLD-MCNC: 13.3 G/DL (ref 13–17.7)
INR PPP: 1.05 (ref 0.9–1.1)
LYMPHOCYTES # BLD AUTO: 1.7 10*3/MM3 (ref 0.7–3.1)
LYMPHOCYTES NFR BLD AUTO: 21.7 % (ref 19.6–45.3)
Lab: NORMAL
M-SPIKE: NORMAL G/DL
MCH RBC QN AUTO: 30.4 PG (ref 26.6–33)
MCHC RBC AUTO-ENTMCNC: 34.2 G/DL (ref 31.5–35.7)
MCV RBC AUTO: 89 FL (ref 79–97)
MONOCYTES # BLD AUTO: 0.7 10*3/MM3 (ref 0.1–0.9)
MONOCYTES NFR BLD AUTO: 9.2 % (ref 5–12)
NEUTROPHILS # BLD AUTO: 5.1 10*3/MM3 (ref 1.7–7)
NEUTROPHILS NFR BLD AUTO: 66 % (ref 42.7–76)
NRBC BLD AUTO-RTO: 0 /100 WBC (ref 0–0.2)
PHOSPHATE SERPL-MCNC: 3.9 MG/DL (ref 2.4–4.7)
PLATELET # BLD AUTO: 77 10*3/MM3 (ref 140–450)
PMV BLD AUTO: 10.9 FL (ref 6–12)
POTASSIUM BLD-SCNC: 3.8 MMOL/L (ref 3.6–5.1)
PROT PATTERN SERPL ELPH-IMP: NORMAL
PROT SERPL-MCNC: 6.4 G/DL (ref 6–8.5)
PROTHROMBIN TIME: 10.7 SECONDS (ref 9.6–11.7)
RBC # BLD AUTO: 4.38 10*6/MM3 (ref 4.14–5.8)
SODIUM BLD-SCNC: 140 MMOL/L (ref 136–144)
WBC NRBC COR # BLD: 7.8 10*3/MM3 (ref 3.4–10.8)

## 2019-09-16 PROCEDURE — 25010000002 HYDRALAZINE PER 20 MG: Performed by: INTERNAL MEDICINE

## 2019-09-16 PROCEDURE — C1874 STENT, COATED/COV W/DEL SYS: HCPCS | Performed by: INTERNAL MEDICINE

## 2019-09-16 PROCEDURE — C1769 GUIDE WIRE: HCPCS | Performed by: INTERNAL MEDICINE

## 2019-09-16 PROCEDURE — 027135Z DILATION OF CORONARY ARTERY, TWO ARTERIES WITH TWO DRUG-ELUTING INTRALUMINAL DEVICES, PERCUTANEOUS APPROACH: ICD-10-PCS | Performed by: INTERNAL MEDICINE

## 2019-09-16 PROCEDURE — 25010000002 HEPARIN (PORCINE) PER 1000 UNITS: Performed by: INTERNAL MEDICINE

## 2019-09-16 PROCEDURE — C1887 CATHETER, GUIDING: HCPCS | Performed by: INTERNAL MEDICINE

## 2019-09-16 PROCEDURE — 85347 COAGULATION TIME ACTIVATED: CPT

## 2019-09-16 PROCEDURE — 93005 ELECTROCARDIOGRAM TRACING: CPT | Performed by: INTERNAL MEDICINE

## 2019-09-16 PROCEDURE — 5A0221D ASSISTANCE WITH CARDIAC OUTPUT USING IMPELLER PUMP, CONTINUOUS: ICD-10-PCS | Performed by: INTERNAL MEDICINE

## 2019-09-16 PROCEDURE — 80069 RENAL FUNCTION PANEL: CPT | Performed by: INTERNAL MEDICINE

## 2019-09-16 PROCEDURE — 85610 PROTHROMBIN TIME: CPT | Performed by: INTERNAL MEDICINE

## 2019-09-16 PROCEDURE — 33990 INSJ PERQ VAD L HRT ARTERIAL: CPT | Performed by: INTERNAL MEDICINE

## 2019-09-16 PROCEDURE — 85025 COMPLETE CBC W/AUTO DIFF WBC: CPT | Performed by: INTERNAL MEDICINE

## 2019-09-16 PROCEDURE — 92928 PRQ TCAT PLMT NTRAC ST 1 LES: CPT | Performed by: INTERNAL MEDICINE

## 2019-09-16 PROCEDURE — C1760 CLOSURE DEV, VASC: HCPCS | Performed by: INTERNAL MEDICINE

## 2019-09-16 PROCEDURE — 25010000002 MIDAZOLAM PER 1 MG: Performed by: INTERNAL MEDICINE

## 2019-09-16 PROCEDURE — 0 IOPAMIDOL PER 1 ML: Performed by: INTERNAL MEDICINE

## 2019-09-16 PROCEDURE — C1894 INTRO/SHEATH, NON-LASER: HCPCS | Performed by: INTERNAL MEDICINE

## 2019-09-16 PROCEDURE — 85730 THROMBOPLASTIN TIME PARTIAL: CPT | Performed by: INTERNAL MEDICINE

## 2019-09-16 PROCEDURE — 02HA3RJ INSERTION OF SHORT-TERM EXTERNAL HEART ASSIST SYSTEM INTO HEART, INTRAOPERATIVE, PERCUTANEOUS APPROACH: ICD-10-PCS | Performed by: INTERNAL MEDICINE

## 2019-09-16 PROCEDURE — C9600 PERC DRUG-EL COR STENT SING: HCPCS | Performed by: INTERNAL MEDICINE

## 2019-09-16 PROCEDURE — 25010000002 FENTANYL CITRATE (PF) 100 MCG/2ML SOLUTION: Performed by: INTERNAL MEDICINE

## 2019-09-16 PROCEDURE — 99232 SBSQ HOSP IP/OBS MODERATE 35: CPT | Performed by: INTERNAL MEDICINE

## 2019-09-16 DEVICE — XIENCE SIERRA™ EVEROLIMUS ELUTING CORONARY STENT SYSTEM 3.00 MM X 18 MM / RAPID-EXCHANGE
Type: IMPLANTABLE DEVICE | Status: FUNCTIONAL
Brand: XIENCE SIERRA™

## 2019-09-16 DEVICE — XIENCE SIERRA™ EVEROLIMUS ELUTING CORONARY STENT SYSTEM 3.50 MM X 18 MM / RAPID-EXCHANGE
Type: IMPLANTABLE DEVICE | Status: FUNCTIONAL
Brand: XIENCE SIERRA™

## 2019-09-16 RX ORDER — ASPIRIN 81 MG/1
81 TABLET ORAL DAILY
Status: DISCONTINUED | OUTPATIENT
Start: 2019-09-16 | End: 2019-09-16 | Stop reason: SDUPTHER

## 2019-09-16 RX ORDER — ONDANSETRON 2 MG/ML
4 INJECTION INTRAMUSCULAR; INTRAVENOUS EVERY 6 HOURS PRN
Status: DISCONTINUED | OUTPATIENT
Start: 2019-09-16 | End: 2019-09-18 | Stop reason: HOSPADM

## 2019-09-16 RX ORDER — ATROPINE SULFATE 1 MG/ML
.5-1 INJECTION, SOLUTION INTRAMUSCULAR; INTRAVENOUS; SUBCUTANEOUS
Status: DISCONTINUED | OUTPATIENT
Start: 2019-09-16 | End: 2019-09-16 | Stop reason: SDUPTHER

## 2019-09-16 RX ORDER — LIDOCAINE HYDROCHLORIDE 20 MG/ML
INJECTION, SOLUTION INFILTRATION; PERINEURAL AS NEEDED
Status: DISCONTINUED | OUTPATIENT
Start: 2019-09-16 | End: 2019-09-16 | Stop reason: HOSPADM

## 2019-09-16 RX ORDER — CLOPIDOGREL 300 MG/1
TABLET, FILM COATED ORAL AS NEEDED
Status: DISCONTINUED | OUTPATIENT
Start: 2019-09-16 | End: 2019-09-16 | Stop reason: HOSPADM

## 2019-09-16 RX ORDER — NITROGLYCERIN 5 MG/ML
INJECTION, SOLUTION INTRAVENOUS AS NEEDED
Status: DISCONTINUED | OUTPATIENT
Start: 2019-09-16 | End: 2019-09-16 | Stop reason: HOSPADM

## 2019-09-16 RX ORDER — HEPARIN SODIUM 1000 [USP'U]/ML
INJECTION, SOLUTION INTRAVENOUS; SUBCUTANEOUS AS NEEDED
Status: DISCONTINUED | OUTPATIENT
Start: 2019-09-16 | End: 2019-09-16 | Stop reason: HOSPADM

## 2019-09-16 RX ORDER — NITROGLYCERIN 20 MG/100ML
10-50 INJECTION INTRAVENOUS
Status: DISCONTINUED | OUTPATIENT
Start: 2019-09-16 | End: 2019-09-18

## 2019-09-16 RX ORDER — FENTANYL CITRATE 50 UG/ML
INJECTION, SOLUTION INTRAMUSCULAR; INTRAVENOUS AS NEEDED
Status: DISCONTINUED | OUTPATIENT
Start: 2019-09-16 | End: 2019-09-16 | Stop reason: HOSPADM

## 2019-09-16 RX ORDER — ONDANSETRON 4 MG/1
4 TABLET, FILM COATED ORAL EVERY 6 HOURS PRN
Status: DISCONTINUED | OUTPATIENT
Start: 2019-09-16 | End: 2019-09-18 | Stop reason: HOSPADM

## 2019-09-16 RX ORDER — HYDRALAZINE HYDROCHLORIDE 20 MG/ML
10 INJECTION INTRAMUSCULAR; INTRAVENOUS ONCE
Status: COMPLETED | OUTPATIENT
Start: 2019-09-16 | End: 2019-09-16

## 2019-09-16 RX ORDER — ALUMINA, MAGNESIA, AND SIMETHICONE 2400; 2400; 240 MG/30ML; MG/30ML; MG/30ML
15 SUSPENSION ORAL EVERY 6 HOURS PRN
Status: DISCONTINUED | OUTPATIENT
Start: 2019-09-16 | End: 2019-09-18 | Stop reason: HOSPADM

## 2019-09-16 RX ORDER — MIDAZOLAM HYDROCHLORIDE 1 MG/ML
INJECTION INTRAMUSCULAR; INTRAVENOUS AS NEEDED
Status: DISCONTINUED | OUTPATIENT
Start: 2019-09-16 | End: 2019-09-16 | Stop reason: HOSPADM

## 2019-09-16 RX ORDER — ACETAMINOPHEN 325 MG/1
650 TABLET ORAL EVERY 4 HOURS PRN
Status: DISCONTINUED | OUTPATIENT
Start: 2019-09-16 | End: 2019-09-16 | Stop reason: SDUPTHER

## 2019-09-16 RX ORDER — DOCUSATE SODIUM 100 MG/1
100 CAPSULE, LIQUID FILLED ORAL 2 TIMES DAILY
Status: DISCONTINUED | OUTPATIENT
Start: 2019-09-16 | End: 2019-09-18 | Stop reason: HOSPADM

## 2019-09-16 RX ORDER — SODIUM CHLORIDE 9 MG/ML
250 INJECTION, SOLUTION INTRAVENOUS ONCE AS NEEDED
Status: DISCONTINUED | OUTPATIENT
Start: 2019-09-16 | End: 2019-09-18 | Stop reason: HOSPADM

## 2019-09-16 RX ADMIN — HYDRALAZINE HYDROCHLORIDE 10 MG: 20 INJECTION INTRAMUSCULAR; INTRAVENOUS at 13:59

## 2019-09-16 RX ADMIN — SODIUM CHLORIDE 75 ML/HR: 900 INJECTION, SOLUTION INTRAVENOUS at 02:58

## 2019-09-16 RX ADMIN — HYDRALAZINE HYDROCHLORIDE 50 MG: 25 TABLET, FILM COATED ORAL at 21:18

## 2019-09-16 RX ADMIN — HYDRALAZINE HYDROCHLORIDE 50 MG: 25 TABLET, FILM COATED ORAL at 07:50

## 2019-09-16 RX ADMIN — ISOSORBIDE MONONITRATE 30 MG: 30 TABLET, EXTENDED RELEASE ORAL at 07:50

## 2019-09-16 RX ADMIN — FAMOTIDINE 20 MG: 20 TABLET, FILM COATED ORAL at 07:51

## 2019-09-16 RX ADMIN — ATORVASTATIN CALCIUM 40 MG: 40 TABLET, FILM COATED ORAL at 21:19

## 2019-09-16 RX ADMIN — ASPIRIN 81 MG: 81 TABLET, DELAYED RELEASE ORAL at 07:50

## 2019-09-16 RX ADMIN — Medication 1200 MG: at 08:00

## 2019-09-16 RX ADMIN — CLOPIDOGREL BISULFATE 75 MG: 75 TABLET ORAL at 07:51

## 2019-09-16 RX ADMIN — METOPROLOL TARTRATE 25 MG: 25 TABLET, FILM COATED ORAL at 07:51

## 2019-09-16 RX ADMIN — ACETAMINOPHEN 650 MG: 325 TABLET ORAL at 21:24

## 2019-09-16 RX ADMIN — Medication 1200 MG: at 21:18

## 2019-09-16 RX ADMIN — METOPROLOL TARTRATE 25 MG: 25 TABLET, FILM COATED ORAL at 21:19

## 2019-09-16 RX ADMIN — DOCUSATE SODIUM 100 MG: 100 CAPSULE, LIQUID FILLED ORAL at 21:19

## 2019-09-16 NOTE — PLAN OF CARE
Problem: Patient Care Overview  Goal: Plan of Care Review  Outcome: Ongoing (interventions implemented as appropriate)    Goal: Individualization and Mutuality  Outcome: Ongoing (interventions implemented as appropriate)    Goal: Discharge Needs Assessment  Outcome: Ongoing (interventions implemented as appropriate)    Goal: Interprofessional Rounds/Family Conf  Outcome: Ongoing (interventions implemented as appropriate)      Problem: Cardiac: Heart Failure (Adult)  Goal: Signs and Symptoms of Listed Potential Problems Will be Absent, Minimized or Managed (Cardiac: Heart Failure)  Outcome: Ongoing (interventions implemented as appropriate)

## 2019-09-16 NOTE — PROGRESS NOTES
"NEPHROLOGY PROGRESS NOTE    [facility]  [phone]  Heide Reno MD      Patient Care Team:  Zev Li MD as PCP - General (Family Medicine)      Provider:  Heide Reno MD  Patient Name: Devaughn Valdez  :  1955    SUBJECTIVE:  F/u MARINA/CKD  No complaints. No angina. Awaiting cardiac cath. No dysuria    Medication:    Acetylcysteine 1,200 mg Oral BID   aspirin 81 mg Oral Daily   atorvastatin 40 mg Oral Nightly   clopidogrel 75 mg Oral Daily   famotidine 20 mg Oral Daily   hydrALAZINE 50 mg Oral Q12H   isosorbide mononitrate 30 mg Oral Q24H   metoprolol tartrate 25 mg Oral BID       sodium chloride 75 mL/hr Last Rate: 75 mL/hr (19 0258)       OBJECTIVE    Vital Sign Min/Max for last 24 hours  Temp  Min: 96.5 °F (35.8 °C)  Max: 99.3 °F (37.4 °C)   BP  Min: 127/77  Max: 152/79   Pulse  Min: 65  Max: 85   Resp  Min: 16  Max: 20   SpO2  Min: 94 %  Max: 100 %   No Data Recorded   Weight  Min: 89.2 kg (196 lb 10.4 oz)  Max: 89.2 kg (196 lb 10.4 oz)     Flowsheet Rows      First Filed Value   Admission Height  172.7 cm (68\") Documented at 2019 0628   Admission Weight  87 kg (191 lb 12.8 oz) Documented at 2019 0628          No intake/output data recorded.  I/O last 3 completed shifts:  In: 260 [P.O.:260]  Out: 150 [Urine:150]    Physical Exam:  General Appearance: alert, appears stated age and cooperative  Head: normocephalic, without obvious abnormality and atraumatic  Eyes: conjunctivae and sclerae normal and no icterus  Neck: supple and no JVD  Lungs: clear to auscultation and respirations regular  Heart: regular rhythm & normal rate and normal S1, S2 +DAVID  Chest: Wall no abnormalities observed  Abdomen: normal bowel sounds and soft non-tender  Extremities: moves extremities well, no edema, no cyanosis and no redness  Skin: no bleeding, bruising or rash, turgor normal, color normal and no leasions noted  Neurologic: Alert, and oriented. No focal " deficits    Labs:    WBC WBC   Date Value Ref Range Status   09/16/2019 7.80 3.40 - 10.80 10*3/mm3 Final   09/15/2019 6.90 3.40 - 10.80 10*3/mm3 Final   09/14/2019 10.70 3.40 - 10.80 10*3/mm3 Final   09/13/2019 11.10 (H) 3.40 - 10.80 10*3/mm3 Final      HGB Hemoglobin   Date Value Ref Range Status   09/16/2019 13.3 13.0 - 17.7 g/dL Final   09/15/2019 13.1 13.0 - 17.7 g/dL Final   09/14/2019 13.6 13.0 - 17.7 g/dL Final   09/13/2019 14.6 13.0 - 17.7 g/dL Final      HCT Hematocrit   Date Value Ref Range Status   09/16/2019 39.0 37.5 - 51.0 % Final   09/15/2019 38.6 37.5 - 51.0 % Final   09/14/2019 39.9 37.5 - 51.0 % Final   09/13/2019 43.6 37.5 - 51.0 % Final      Platlets No results found for: LABPLAT   MCV MCV   Date Value Ref Range Status   09/16/2019 89.0 79.0 - 97.0 fL Final   09/15/2019 88.6 79.0 - 97.0 fL Final   09/14/2019 88.9 79.0 - 97.0 fL Final   09/13/2019 88.9 79.0 - 97.0 fL Final          Sodium Sodium   Date Value Ref Range Status   09/16/2019 140 136 - 144 mmol/L Final   09/15/2019 135 (L) 136 - 144 mmol/L Final   09/14/2019 138 136 - 144 mmol/L Final   09/13/2019 140 136 - 144 mmol/L Final      Potassium Potassium   Date Value Ref Range Status   09/16/2019 3.8 3.6 - 5.1 mmol/L Final   09/15/2019 3.9 3.6 - 5.1 mmol/L Final   09/14/2019 3.9 3.6 - 5.1 mmol/L Final   09/13/2019 4.0 3.6 - 5.1 mmol/L Final      Chloride Chloride   Date Value Ref Range Status   09/16/2019 110 101 - 111 mmol/L Final   09/15/2019 104 101 - 111 mmol/L Final   09/14/2019 108 101 - 111 mmol/L Final   09/13/2019 105 101 - 111 mmol/L Final      CO2 CO2   Date Value Ref Range Status   09/16/2019 22.0 22.0 - 32.0 mmol/L Final   09/15/2019 23.0 22.0 - 32.0 mmol/L Final   09/14/2019 24.0 22.0 - 32.0 mmol/L Final   09/13/2019 25.0 22.0 - 32.0 mmol/L Final      BUN BUN   Date Value Ref Range Status   09/16/2019 15 8 - 20 mg/dL Final   09/15/2019 17 8 - 20 mg/dL Final   09/14/2019 17 8 - 20 mg/dL Final   09/13/2019 17 8 - 20 mg/dL Final       Creatinine Creatinine   Date Value Ref Range Status   09/16/2019 1.10 0.70 - 1.20 mg/dL Final   09/15/2019 1.00 0.70 - 1.20 mg/dL Final   09/14/2019 1.10 0.70 - 1.20 mg/dL Final   09/13/2019 1.30 (H) 0.70 - 1.20 mg/dL Final      Calcium Calcium   Date Value Ref Range Status   09/16/2019 8.7 (L) 8.9 - 10.3 mg/dL Final   09/15/2019 8.5 (L) 8.9 - 10.3 mg/dL Final   09/14/2019 8.3 (L) 8.9 - 10.3 mg/dL Final   09/13/2019 8.8 (L) 8.9 - 10.3 mg/dL Final      PO4 No components found for: PO4   Albumin Albumin   Date Value Ref Range Status   09/16/2019 3.30 (L) 3.50 - 4.80 g/dL Final   09/15/2019 3.20 (L) 3.50 - 4.80 g/dL Final   09/14/2019 3.00 (L) 3.50 - 4.80 g/dL Final      Magnesium Magnesium   Date Value Ref Range Status   09/15/2019 1.9 1.8 - 2.5 mg/dL Final   09/14/2019 1.8 1.8 - 2.5 mg/dL Final      Uric Acid No components found for: URIC ACID     Imaging Results (last 72 hours)     Procedure Component Value Units Date/Time    US Renal Bilateral [[id number]] Collected:  09/13/19 2059     Updated:  09/13/19 2103    Narrative:       Examination:      Date of Exam: 9/13/2019 7:45 PM     Indication: acute renal failure; R06.01-Orthopnea; I42.0-Dilated  cardiomyopathy; I50.21-Acute systolic (congestive) heart failure;  I10-Essential (primary) hypertension.     Comparison: None available.     Technique: Grayscale and color Doppler ultrasound evaluation of the  kidneys and urinary bladder was performed     Findings:  The right kidney is about 11 cm in length by 4.7 x 7.5 seen.     The left kidney is about 10.5 cm in length by 4 x 5 cm.        Renal cortical thickness and echogenicity appear within normal. Neither  kidney is hydronephrotic. There is color flow to each kidney.      Limited exam of the bladder shows a volume of 204 cc. The prostate is  enlarged and lobulated in shape. It measures about 4 cm diameter.       Impression:          1. Normal appearance of kidneys.  2. Mild prostatic enlargement.      Electronically Signed By-Saniya Dejesus On:9/13/2019 9:01 PM  This report was finalized on [id number] by  Saniya Dejesus, .          Results for orders placed in visit on 08/15/19   XR Chest PA & Lateral       Results for orders placed during the hospital encounter of 09/13/19   Duplex Renal Artery - Bilateral Complete CAR    Narrative · Normal right renal artery.  · Normal left renal artery.            ASSESSMENT / PLAN      Orthopnea    Essential hypertension    Dilated cardiomyopathy (CMS/HCC)    Acute systolic congestive heart failure (CMS/HCC)    Coronary artery disease involving native coronary artery of native heart with angina pectoris (CMS/HCC)    Non-rheumatic mitral regurgitation    Nonrheumatic aortic valve insufficiency    1. MARINA/CKD3--- +Nonoliguric. +very mild ARF/MARINA on top of what appears to be CRF/CKDwith baseline serum creatinine of 1.1mg/dl. Scr currently 1.3mg/dl. +ARF/MARINA secondary to prerenal state/intravascular volume depletion with concomitant use of ACE-I in form of lisinopril. Stop lisinopril. renal artery duplex and PVR. No NSAIDs. Premedicated for cath today. Patient was explained the risks of IV dye he was exposed to today and I will given mucomyst and IVF post procedure.      2. CAD--- s/p heart cath . Lesion noted in LAD at bifurcation. PCI planned for today     3. VALVULAR HEART DISEASE/CARDIOMYOPATHY--- EF reportedly 20%      4. HTN WITH CKD---- No ACE/ARB/DRI/Diuretics at this time. Agree with addition of Hydralazine for afterload reduction. Will add low dose Imdur too.     5. PUD PROPHYLAXIS--- Started renal dose pepcid.     6. DVT PROPHYLAXIS--- SCD's            Heide Reno MD  [facility]  [phone]  09/16/19  6:32 AM

## 2019-09-17 DIAGNOSIS — I42.0 DILATED CARDIOMYOPATHY (HCC): ICD-10-CM

## 2019-09-17 DIAGNOSIS — I10 ESSENTIAL HYPERTENSION: ICD-10-CM

## 2019-09-17 DIAGNOSIS — I50.21 ACUTE SYSTOLIC CONGESTIVE HEART FAILURE (HCC): ICD-10-CM

## 2019-09-17 LAB
ALBUMIN SERPL-MCNC: 3.2 G/DL (ref 3.5–4.8)
ALBUMIN/GLOB SERPL: 1 G/DL (ref 1–1.7)
ALP SERPL-CCNC: 49 U/L (ref 32–91)
ALT SERPL W P-5'-P-CCNC: 18 U/L (ref 17–63)
ANION GAP SERPL CALCULATED.3IONS-SCNC: 13 MMOL/L (ref 5–15)
ARTICHOKE IGE QN: 92 MG/DL (ref 0–100)
AST SERPL-CCNC: 22 U/L (ref 15–41)
BASOPHILS # BLD AUTO: 0 10*3/MM3 (ref 0–0.2)
BASOPHILS # BLD AUTO: 0 10*3/MM3 (ref 0–0.2)
BASOPHILS NFR BLD AUTO: 0.4 % (ref 0–1.5)
BASOPHILS NFR BLD AUTO: 0.6 % (ref 0–1.5)
BILIRUB SERPL-MCNC: 1 MG/DL (ref 0.3–1.2)
BUN BLD-MCNC: 13 MG/DL (ref 8–20)
BUN/CREAT SERPL: 13 (ref 6.2–20.3)
CA-I SERPL ISE-MCNC: 1.24 MMOL/L (ref 1.2–1.3)
CALCIUM SPEC-SCNC: 8.8 MG/DL (ref 8.9–10.3)
CHLORIDE SERPL-SCNC: 108 MMOL/L (ref 101–111)
CHOLEST SERPL-MCNC: 135 MG/DL
CO2 SERPL-SCNC: 25 MMOL/L (ref 22–32)
CREAT BLD-MCNC: 1 MG/DL (ref 0.7–1.2)
DEPRECATED RDW RBC AUTO: 42.4 FL (ref 37–54)
DEPRECATED RDW RBC AUTO: 44.2 FL (ref 37–54)
EOSINOPHIL # BLD AUTO: 0.2 10*3/MM3 (ref 0–0.4)
EOSINOPHIL # BLD AUTO: 0.2 10*3/MM3 (ref 0–0.4)
EOSINOPHIL NFR BLD AUTO: 2.5 % (ref 0.3–6.2)
EOSINOPHIL NFR BLD AUTO: 2.9 % (ref 0.3–6.2)
ERYTHROCYTE [DISTWIDTH] IN BLOOD BY AUTOMATED COUNT: 13.7 % (ref 12.3–15.4)
ERYTHROCYTE [DISTWIDTH] IN BLOOD BY AUTOMATED COUNT: 13.9 % (ref 12.3–15.4)
GFR SERPL CREATININE-BSD FRML MDRD: 76 ML/MIN/1.73
GLOBULIN UR ELPH-MCNC: 3.2 GM/DL (ref 2.5–3.8)
GLUCOSE BLD-MCNC: 102 MG/DL (ref 65–99)
HCT VFR BLD AUTO: 37.8 % (ref 37.5–51)
HCT VFR BLD AUTO: 39.4 % (ref 37.5–51)
HDLC SERPL QL: 3.86
HDLC SERPL-MCNC: 35 MG/DL
HGB BLD-MCNC: 12.7 G/DL (ref 13–17.7)
HGB BLD-MCNC: 13.1 G/DL (ref 13–17.7)
INR PPP: 1.02 (ref 0.9–1.1)
LDLC/HDLC SERPL: 2.46 {RATIO}
LYMPHOCYTES # BLD AUTO: 1.7 10*3/MM3 (ref 0.7–3.1)
LYMPHOCYTES # BLD AUTO: 1.8 10*3/MM3 (ref 0.7–3.1)
LYMPHOCYTES NFR BLD AUTO: 23.5 % (ref 19.6–45.3)
LYMPHOCYTES NFR BLD AUTO: 24.3 % (ref 19.6–45.3)
MAGNESIUM SERPL-MCNC: 1.8 MG/DL (ref 1.8–2.5)
MCH RBC QN AUTO: 29.9 PG (ref 26.6–33)
MCH RBC QN AUTO: 30 PG (ref 26.6–33)
MCHC RBC AUTO-ENTMCNC: 33.2 G/DL (ref 31.5–35.7)
MCHC RBC AUTO-ENTMCNC: 33.6 G/DL (ref 31.5–35.7)
MCV RBC AUTO: 89.1 FL (ref 79–97)
MCV RBC AUTO: 90.2 FL (ref 79–97)
MONOCYTES # BLD AUTO: 0.6 10*3/MM3 (ref 0.1–0.9)
MONOCYTES # BLD AUTO: 0.7 10*3/MM3 (ref 0.1–0.9)
MONOCYTES NFR BLD AUTO: 10.2 % (ref 5–12)
MONOCYTES NFR BLD AUTO: 9 % (ref 5–12)
NEUTROPHILS # BLD AUTO: 4.5 10*3/MM3 (ref 1.7–7)
NEUTROPHILS # BLD AUTO: 4.6 10*3/MM3 (ref 1.7–7)
NEUTROPHILS NFR BLD AUTO: 62 % (ref 42.7–76)
NEUTROPHILS NFR BLD AUTO: 64.6 % (ref 42.7–76)
NRBC BLD AUTO-RTO: 0 /100 WBC (ref 0–0.2)
NRBC BLD AUTO-RTO: 0 /100 WBC (ref 0–0.2)
PHOSPHATE SERPL-MCNC: 3.9 MG/DL (ref 2.4–4.7)
PLATELET # BLD AUTO: 83 10*3/MM3 (ref 140–450)
PLATELET # BLD AUTO: 94 10*3/MM3 (ref 140–450)
PMV BLD AUTO: 11.2 FL (ref 6–12)
PMV BLD AUTO: 11.6 FL (ref 6–12)
POTASSIUM BLD-SCNC: 4 MMOL/L (ref 3.6–5.1)
PROT SERPL-MCNC: 6.4 G/DL (ref 6.1–7.9)
PROTHROMBIN TIME: 10.5 SECONDS (ref 9.6–11.7)
RBC # BLD AUTO: 4.24 10*6/MM3 (ref 4.14–5.8)
RBC # BLD AUTO: 4.37 10*6/MM3 (ref 4.14–5.8)
SODIUM BLD-SCNC: 142 MMOL/L (ref 136–144)
TRIGL SERPL-MCNC: 69 MG/DL
VLDLC SERPL-MCNC: 13.8 MG/DL
WBC NRBC COR # BLD: 7 10*3/MM3 (ref 3.4–10.8)
WBC NRBC COR # BLD: 7.2 10*3/MM3 (ref 3.4–10.8)

## 2019-09-17 PROCEDURE — 25010000002 MAGNESIUM SULFATE IN D5W 1G/100ML (PREMIX) 1-5 GM/100ML-% SOLUTION: Performed by: INTERNAL MEDICINE

## 2019-09-17 PROCEDURE — 99232 SBSQ HOSP IP/OBS MODERATE 35: CPT | Performed by: INTERNAL MEDICINE

## 2019-09-17 PROCEDURE — 80061 LIPID PANEL: CPT | Performed by: INTERNAL MEDICINE

## 2019-09-17 PROCEDURE — 85025 COMPLETE CBC W/AUTO DIFF WBC: CPT

## 2019-09-17 PROCEDURE — 83735 ASSAY OF MAGNESIUM: CPT | Performed by: INTERNAL MEDICINE

## 2019-09-17 PROCEDURE — 82330 ASSAY OF CALCIUM: CPT | Performed by: INTERNAL MEDICINE

## 2019-09-17 PROCEDURE — 84100 ASSAY OF PHOSPHORUS: CPT | Performed by: INTERNAL MEDICINE

## 2019-09-17 PROCEDURE — 93005 ELECTROCARDIOGRAM TRACING: CPT | Performed by: INTERNAL MEDICINE

## 2019-09-17 PROCEDURE — 85025 COMPLETE CBC W/AUTO DIFF WBC: CPT | Performed by: INTERNAL MEDICINE

## 2019-09-17 PROCEDURE — 80053 COMPREHEN METABOLIC PANEL: CPT | Performed by: INTERNAL MEDICINE

## 2019-09-17 PROCEDURE — 85610 PROTHROMBIN TIME: CPT

## 2019-09-17 PROCEDURE — 36415 COLL VENOUS BLD VENIPUNCTURE: CPT

## 2019-09-17 RX ORDER — MAGNESIUM SULFATE 1 G/100ML
1 INJECTION INTRAVENOUS ONCE
Status: COMPLETED | OUTPATIENT
Start: 2019-09-17 | End: 2019-09-17

## 2019-09-17 RX ORDER — ISOSORBIDE MONONITRATE 60 MG/1
60 TABLET, EXTENDED RELEASE ORAL
Status: DISCONTINUED | OUTPATIENT
Start: 2019-09-18 | End: 2019-09-18 | Stop reason: HOSPADM

## 2019-09-17 RX ORDER — ISOSORBIDE MONONITRATE 30 MG/1
30 TABLET, EXTENDED RELEASE ORAL ONCE
Status: COMPLETED | OUTPATIENT
Start: 2019-09-17 | End: 2019-09-17

## 2019-09-17 RX ORDER — CALCIUM GLUCONATE 20 MG/ML
1 INJECTION, SOLUTION INTRAVENOUS ONCE
Status: COMPLETED | OUTPATIENT
Start: 2019-09-17 | End: 2019-09-17

## 2019-09-17 RX ORDER — HYDRALAZINE HYDROCHLORIDE 25 MG/1
50 TABLET, FILM COATED ORAL EVERY 8 HOURS SCHEDULED
Status: DISCONTINUED | OUTPATIENT
Start: 2019-09-17 | End: 2019-09-18 | Stop reason: HOSPADM

## 2019-09-17 RX ADMIN — HYDRALAZINE HYDROCHLORIDE 50 MG: 25 TABLET, FILM COATED ORAL at 14:05

## 2019-09-17 RX ADMIN — DOCUSATE SODIUM 100 MG: 100 CAPSULE, LIQUID FILLED ORAL at 08:08

## 2019-09-17 RX ADMIN — ACETAMINOPHEN 650 MG: 325 TABLET ORAL at 12:12

## 2019-09-17 RX ADMIN — METOPROLOL TARTRATE 25 MG: 25 TABLET, FILM COATED ORAL at 07:05

## 2019-09-17 RX ADMIN — DOCUSATE SODIUM 100 MG: 100 CAPSULE, LIQUID FILLED ORAL at 22:18

## 2019-09-17 RX ADMIN — CLOPIDOGREL BISULFATE 75 MG: 75 TABLET ORAL at 08:07

## 2019-09-17 RX ADMIN — ISOSORBIDE MONONITRATE 30 MG: 30 TABLET, EXTENDED RELEASE ORAL at 08:07

## 2019-09-17 RX ADMIN — Medication 1200 MG: at 08:09

## 2019-09-17 RX ADMIN — HYDRALAZINE HYDROCHLORIDE 50 MG: 25 TABLET, FILM COATED ORAL at 08:07

## 2019-09-17 RX ADMIN — ASPIRIN 81 MG: 81 TABLET, DELAYED RELEASE ORAL at 08:07

## 2019-09-17 RX ADMIN — HYDRALAZINE HYDROCHLORIDE 50 MG: 25 TABLET, FILM COATED ORAL at 22:18

## 2019-09-17 RX ADMIN — ISOSORBIDE MONONITRATE 30 MG: 30 TABLET, EXTENDED RELEASE ORAL at 07:06

## 2019-09-17 RX ADMIN — MAGNESIUM SULFATE HEPTAHYDRATE 1 G: 1 INJECTION, SOLUTION INTRAVENOUS at 08:08

## 2019-09-17 RX ADMIN — FAMOTIDINE 20 MG: 20 TABLET, FILM COATED ORAL at 08:07

## 2019-09-17 RX ADMIN — ATORVASTATIN CALCIUM 40 MG: 40 TABLET, FILM COATED ORAL at 22:17

## 2019-09-17 RX ADMIN — CALCIUM GLUCONATE 1 G: 20 INJECTION, SOLUTION INTRAVENOUS at 08:08

## 2019-09-17 RX ADMIN — METOPROLOL TARTRATE 25 MG: 25 TABLET, FILM COATED ORAL at 22:18

## 2019-09-17 NOTE — PROGRESS NOTES
"Cardiology Progress  Note      Patient Care Team:  Zev Li MD as PCP - General (Family Medicine)      PATIENT IDENTIFICATION    Name: Devaughn Valdez Age: 64 y.o. Sex: male :  1955 MRN: [id number]    REASON FOR FOLLOW-UP:  Multivessel coronary artery disease involving the mid LAD and mid RCA status post Impella assisted PCI with drug-eluting stent placement.        SUBJECTIVE    Patient denies any chest pain or shortness of breath  Denies any tenderness at cath site      REVIEW OF SYSTEMS:  Pertinent items are noted in HPI, all other systems reviewed and negative    OBJECTIVE   Sitting in chair watching television talking with wife    ASSESSMENT/PLAN    Orthopnea    Essential hypertension    Dilated cardiomyopathy (CMS/HCC)    Acute systolic congestive heart failure (CMS/HCC)    Coronary artery disease involving native coronary artery of native heart with angina pectoris (CMS/HCC)    Non-rheumatic mitral regurgitation    Nonrheumatic aortic valve insufficiency    Plan  Continue dual antiplatelet therapy consisting of aspirin 81 mg daily and Plavix 75 mg daily  Patient may transfer out of intensive care unit  Continue aggressive risk factor modification  Medical therapy for cardiomyopathy  Continue aspirin, statin, beta-blocker, long-acting nitroglycerin  ACE inhibitor contraindicated secondary to acute renal insufficiency  Nephrology is following  Further recommendations per cardiologist        Vital Signs  Visit Vitals  BP (!) 191/92   Pulse 68   Temp 98.5 °F (36.9 °C)   Resp 17   Ht 177.8 cm (70\")   Wt 86.7 kg (191 lb 2.2 oz)   SpO2 96%   BMI 27.43 kg/m²     Oxygen Therapy  SpO2: 96 %  Pulse Oximetry Type: Intermittent  Device (Oxygen Therapy): room air  Flow (L/min): 0  Flowsheet Rows      First Filed Value   Admission Height  172.7 cm (68\") Documented at 2019 06   Admission Weight  87 kg (191 lb 12.8 oz) Documented at 2019 0628        Intake & Output (last 3 days)        0701 " "- 09/15 0700 09/15 0701 - 09/16 0700 09/16 0701 - 09/17 0700 09/17 0701 - 09/18 0700    P.O.  260 320     I.V. (mL/kg)   0 (0)     Total Intake(mL/kg)  260 (2.9) 320 (3.7)     Urine (mL/kg/hr) 150 (0.1)  1050 (0.5)     Total Output 150  1050     Net -150 +260 -730             Urine Unmeasured Occurrence 5 x 7 x      Stool Unmeasured Occurrence  1 x          Lines, Drains & Airways    Active LDAs     Name:   Placement date:   Placement time:   Site:   Days:    Peripheral IV 09/13/19 0645 Left Antecubital   09/13/19 0645    Antecubital   4                       BP (!) 191/92   Pulse 68   Temp 98.5 °F (36.9 °C)   Resp 17   Ht 177.8 cm (70\")   Wt 86.7 kg (191 lb 2.2 oz)   SpO2 96%   BMI 27.43 kg/m²   Intake/Output last 3 shifts:  I/O last 3 completed shifts:  In: 320 [P.O.:320]  Out: 1050 [Urine:1050]  Intake/Output this shift:  No intake/output data recorded.    PHYSICAL EXAM:    General: Alert, cooperative, no distress, appears stated age  Head:  Normocephalic, atraumatic, mucous membranes moist  Eyes:  Conjunctiva/corneas clear, EOM's intact     Neck:  Supple,  no adenopathy;      Lungs: Clear to auscultation bilaterally, no wheezes rhonchi rales are noted  Chest wall: No tenderness  Heart::  Regular rate and rhythm, S1 and S2 normal, no murmur, rub or gallop  Abdomen: Soft, non-tender, nondistended bowel sounds active.  Cath site soft, nontender, no hematoma  Extremities: No cyanosis, clubbing, or edema   Pulses: 2+ and symmetric all extremities  Skin:  No rashes or lesions  Neuro/psych: A&O x3. CN II through XII are grossly intact with appropriate affect      Scheduled Meds:        aspirin 81 mg Oral Daily   atorvastatin 40 mg Oral Nightly   clopidogrel 75 mg Oral Daily   docusate sodium 100 mg Oral BID   famotidine 20 mg Oral Daily   hydrALAZINE 50 mg Oral Q8H   [START ON 9/18/2019] isosorbide mononitrate 60 mg Oral Q24H   metoprolol tartrate 25 mg Oral BID       Continuous Infusions:      nitroglycerin " 10-50 mcg/min Last Rate: Stopped (09/16/19 1900)       PRN Meds:    •  acetaminophen  •  aluminum-magnesium hydroxide-simethicone  •  atropine  •  ondansetron **OR** ondansetron  •  sodium chloride        Results Review:     I reviewed the patient's new clinical results.    CBC    Results from last 7 days   Lab Units 09/17/19 0442 09/16/19  0202 09/15/19  0213 09/14/19 0220 09/13/19 1930   WBC 10*3/mm3 7.00 7.80 6.90 10.70 11.10*   HEMOGLOBIN g/dL 13.1 13.3 13.1 13.6 14.6   PLATELETS 10*3/mm3 83* 77* 79* 84* 93*     Cr Clearance Estimated Creatinine Clearance: 93.9 mL/min (by C-G formula based on SCr of 1 mg/dL).  Coag   Results from last 7 days   Lab Units 09/16/19 0202 09/13/19 1930   INR  1.05 1.03   APTT seconds 25.3 25.8     HbA1C No results found for: HGBA1C  Blood Glucose No results found for: POCGLU  Infection     CMP Results from last 7 days   Lab Units 09/17/19 0442 09/16/19  0202 09/15/19  0213 09/14/19 0220 09/13/19 1930 09/13/19  1432 09/13/19  0609   SODIUM mmol/L 142 140 135* 138 140  --  141   POTASSIUM mmol/L 4.0 3.8 3.9 3.9 4.0  --  4.6   CHLORIDE mmol/L 108 110 104 108 105  --  105   CO2 mmol/L 25.0 22.0 23.0 24.0 25.0  --  27.0   BUN mg/dL 13 15 17 17 17  --  17   CREATININE mg/dL 1.00 1.10 1.00 1.10 1.30*  --  1.30*   GLUCOSE mg/dL 102* 112* 106* 123* 165*  --  102*   ALBUMIN g/dL 3.20* 3.30* 3.20* 3.00*  --  3.2  --    BILIRUBIN mg/dL 1.0  --   --  1.3*  --   --   --    ALK PHOS U/L 49  --   --  44  --   --   --    AST (SGOT) U/L 22  --   --  21  --   --   --    ALT (SGPT) U/L 18  --   --  26  --   --   --      ABG      UA  Results from last 7 days   Lab Units 09/13/19  1929   NITRITE UA  Negative     MORE  No results found for: POCMETH, POCAMPHET, POCBARBITUR, POCBENZO, POCCOCAINE, POCOPIATES, POCOXYCODO, POCPHENCYC, POCPROPOXY, POCTHC, POCTRICYC  Lysis Labs Results from last 7 days   Lab Units 09/17/19  0442 09/16/19  0202 09/15/19  0213 09/14/19  0220 09/13/19  1930 09/13/19  0609    INR   --  1.05  --   --  1.03  --    APTT seconds  --  25.3  --   --  25.8  --    HEMOGLOBIN g/dL 13.1 13.3 13.1 13.6 14.6  --    PLATELETS 10*3/mm3 83* 77* 79* 84* 93*  --    CREATININE mg/dL 1.00 1.10 1.00 1.10 1.30* 1.30*     Radiology(recent) No radiology results for the last day      Results from last 7 days   Lab Units 09/14/19  0220   CK TOTAL U/L 29*       Xrays, labs reviewed personally by physician.    ECG/EMG Results (most recent)     Procedure Component Value Units Date/Time    Adult Transesophageal Echo (JENNY) W/ Cont if Necessary Per Protocol [[id number]] Collected:  09/13/19 1108     Updated:  09/13/19 1503     BSA 2.1 m^2      EDV(MOD-sp4) 173.7 ml      ESV(MOD-sp4) 99.9 ml      EF(MOD-sp4) 42.5 %      SV(MOD-sp4) 73.8 ml      SI(MOD-sp4) 35.7 ml/m^2      LV Null Vol (BSA corrected) 84.1 ml/m^2      LV Sys Vol (BSA corrected) 48.4 ml/m^2      AI max kathryn 407.9 cm/sec      AI max PG 66.6 mmHg      AI dec slope 44.9 cm/sec^2      AI dec time 9.1 sec      AI P1/2t 2,662 msec       CV ECHO ANALIA - BZI_BMI 28.0 kilograms/m^2       CV ECHO ANALIA - BSA(HAYCOCK) 2.1 m^2       CV ECHO ANALIA - BZI_METRIC_WEIGHT 88.5 kg       CV ECHO ANALIA - BZI_METRIC_HEIGHT 177.8 cm     Narrative:       · Left ventricular systolic function is severely decreased.  · Estimated EF appears to be in the range of less than 20%.  · Moderate aortic valve regurgitation is present.  · Left atrial cavity size is borderline dilated.  · The left ventricular cavity is severely dilated.  · Right ventricular cavity is borderline dilated.  · Mild tricuspid valve regurgitation is present.       ECG 12 Lead [[id number]] Collected:  09/14/19 0300     Updated:  09/14/19 0302    Narrative:       HEART RATE= 67  bpm  RR Interval= 900  ms  RI Interval= 218  ms  P Horizontal Axis= -76  deg  P Front Axis= -44  deg  QRSD Interval= 185  ms  QT Interval= 476  ms  QRS Axis= -59  deg  T Wave Axis= 105  deg  - ABNORMAL ECG -  Sinus rhythm  Borderline  prolonged NJ interval  Left bundle branch block  ST elevation secondary to IVCD  Electronically Signed By:   Date and Time of Study: 2019-09-14 03:00:25    LOUISE/CRM Study [[id number]] Resulted:  09/16/19 1037     Updated:  09/16/19 1047    Narrative:       Percutaneous coronary intervention     DATE OF PROCEDURE: 9/16/2019    INDICATION FOR PROCEDURE:   Cardiomyopathy  Severe LV dysfunction  Two-vessel coronary artery disease  Renal failure    PROCEDURE PERFORMED:   Successful PCI mid LAD with placement of a drug-eluting stent  Successful PCI of the mid right coronary artery with placement of a   drug-eluting stent  Successful insertion of the Impella left ventricular percutaneous   ventricular assist device  Successful removal of the Impella left ventricular percutaneous   ventricular assist device  Hemostasis of the left common femoral artery with a Perclose closure   device    PROCEDURE COMMENTS:     Informed consent was obtained from the patient after explaining risks and   benefits.  Patient was brought to the cardiac interventional artery and   placed on the table in the usual fashion.  Right groin was prepped and   draped in usual sterile fashion 2% lidocaine was used for localization   right femoral artery was accessed using a modified 7 technique with   micropuncture needle with a 6 Moldovan arterial sheath.   Left femoral artery was accessed using micropuncture and placed 2 Perclose   closure devices before the start of the procedure and a 14 Moldovan Impella   sheath was placed after dilating the tract.  We inserted an Impella CP   left ventricular percutaneous ventricular assist device under fluoroscopic   guidance with a good waveforms and good cardiac output before the start of   the procedure.  And this device was removed at the end of the procedure   and hemostasis was achieved using 2 Perclose closure devices with good   hemostasis.  For the interventional procedure on the mid LAD lesion we used a XB 3.5    guide catheter with a whisper guidewire to cross the lesion in the   midportion of the LAD and the lesion is directly stented with a 3.0 x 18   mm Xience drug-eluting stent that was deployed at 14 krysta with good   angiographic results.  There was a FERCHO-3 flow in the diagonal branch with   no significant compromise on the flow after the interventional procedure.   For the interventional procedure on the right coronary artery we used a   centimeters whisper guidewire with a JR4 guide catheter and the lesion is   directly stented with a 3.5 x 18 mm Xience drug-eluting stent that was   deployed at 18 krysta with good angiographic results.  Patient tolerated   procedure well with no immediate postop completion plan to obtain   hemostasis by manual pressure on the right side.    FINDINGS:      1. CORONARY ANGIOGRAPHY:     LAD: Mid 90% stenosis diagonal bifurcation  FERCHO-3 flow before and after the intervention in the LAD and also in the   diagonal branch  Lesion length is 10 mm  Stented with a 3.0 x 18 mm Xience drug-eluting stent    RCA: 70% stenosis in the mid right coronary artery  FERCHO-3 flow before and after the intervention  Lesion length is 15 mm  Stented with a 3.5 x 18 mm Xience drug-eluting stent    SUMMARY:   Successful PCI mid LAD with placement of a drug-eluting stent  Successful PCI of the mid right coronary artery with placement of a   drug-eluting stent  Successful insertion of the Impella left ventricular percutaneous   ventricular assist device  Successful removal of the Impella left ventricular percutaneous   ventricular assist device  Hemostasis of the left common femoral artery with a Perclose closure   device    RECOMMENDATIONS:   Aspirin 81 mg p.o. once a day  Plavix 75 mg p.o. once a day  Observe patient in CVCU bed overnight  Continued aggressive risk factor modification  Medical therapy for cardiomyopathy  Test results discussed with the patient and family    ECG 12 Lead [[id number]] Collected:   09/16/19 1154     Updated:  09/16/19 1155    Narrative:       HEART RATE= 66  bpm  RR Interval= 904  ms  NJ Interval= 216  ms  P Horizontal Axis= -30  deg  P Front Axis= -59  deg  QRSD Interval= 185  ms  QT Interval= 482  ms  QRS Axis= -59  deg  T Wave Axis= 111  deg  - ABNORMAL ECG -  Sinus or ectopic atrial rhythm  Borderline prolonged NJ interval  Left bundle branch block  ST elevation secondary to IVCD  Electronically Signed By:   Date and Time of Study: 2019-09-16 11:54:44    ECG 12 Lead [[id number]] Collected:  09/17/19 0449     Updated:  09/17/19 0450    Narrative:       HEART RATE= 65  bpm  RR Interval= 920  ms  NJ Interval= 230  ms  P Horizontal Axis= -67  deg  P Front Axis= -36  deg  QRSD Interval= 190  ms  QT Interval= 482  ms  QRS Axis= -55  deg  T Wave Axis= 127  deg  - ABNORMAL ECG -  Sinus rhythm  Prolonged NJ interval  Left bundle branch block  ST elevation secondary to IVCD  Electronically Signed By:   Date and Time of Study: 2019-09-17 04:49:46            Medication Review:   I have reviewed the patient's current medication list  Scheduled Meds:  aspirin 81 mg Oral Daily   atorvastatin 40 mg Oral Nightly   clopidogrel 75 mg Oral Daily   docusate sodium 100 mg Oral BID   famotidine 20 mg Oral Daily   hydrALAZINE 50 mg Oral Q8H   [START ON 9/18/2019] isosorbide mononitrate 60 mg Oral Q24H   metoprolol tartrate 25 mg Oral BID     Continuous Infusions:  nitroglycerin 10-50 mcg/min Last Rate: Stopped (09/16/19 1900)     PRN Meds:.•  acetaminophen  •  aluminum-magnesium hydroxide-simethicone  •  atropine  •  ondansetron **OR** ondansetron  •  sodium chloride    Imaging:  Imaging Results (last 72 hours)     ** No results found for the last 72 hours. **            Marry Kenyon, ROSALINA  09/17/19  11:52 AM

## 2019-09-17 NOTE — PROGRESS NOTES
Discharge Planning Assessment  [facility]     Patient Name: Devaughn Valdez  MRN: [id number]  Today's Date: 9/17/2019    Admit Date: 9/13/2019    Discharge Needs Assessment     Row Name 09/17/19 1356       Living Environment    Lives With  spouse    Current Living Arrangements  home/apartment/condo    Primary Care Provided by  self    Provides Primary Care For  no one    Family Caregiver if Needed  none    Quality of Family Relationships  supportive    Able to Return to Prior Arrangements  yes       Resource/Environmental Concerns    Resource/Environmental Concerns  none    Transportation Concerns  car, none       Transition Planning    Patient/Family Anticipates Transition to  home;home with family    Patient/Family Anticipated Services at Transition  none    Transportation Anticipated  family or friend will provide       Discharge Needs Assessment    Readmission Within the Last 30 Days  no previous admission in last 30 days    Concerns to be Addressed  adjustment to diagnosis/illness;decision making    Equipment Currently Used at Home  cane, straight    Anticipated Changes Related to Illness  none    Equipment Needed After Discharge  none    Discharge Coordination/Progress  Routine D/C Home        Discharge Plan     Row Name 09/17/19 1400       Plan    Plan  Routine D/C Home. Denies any needs at this time.     Patient/Family in Agreement with Plan  yes    Plan Comments  Patient has been independent and driving. Dr. Li is PCP.           Demographic Summary     Row Name 09/17/19 1352       General Information    Admission Type  inpatient    Arrived From  emergency department    Referral Source  other (see comments)    Reason for Consult  discharge planning    Preferred Language  English     Used During This Interaction  no        Functional Status     Row Name 09/17/19 2746       Functional Status    Usual Activity Tolerance  good    Current Activity Tolerance  good       Functional Status, IADL     Medications  independent    Meal Preparation  independent    Housekeeping  independent    Laundry  independent    Shopping  independent       Mental Status    General Appearance WDL  WDL       Mental Status Summary    Recent Changes in Mental Status/Cognitive Functioning  no changes          Anna Naegele RN    [phone]

## 2019-09-17 NOTE — PLAN OF CARE
Problem: Patient Care Overview  Goal: Plan of Care Review  Outcome: Ongoing (interventions implemented as appropriate)   09/17/19 1459   Coping/Psychosocial   Plan of Care Reviewed With patient;spouse   Plan of Care Review   Progress improving     Goal: Interprofessional Rounds/Family Conf  Outcome: Ongoing (interventions implemented as appropriate)   09/17/19 1459   Interdisciplinary Rounds/Family Conf   Summary pt. to transfer out of unit when bed available.    Participants nursing;advanced practice nurse       Problem: Cardiac: Heart Failure (Adult)  Goal: Signs and Symptoms of Listed Potential Problems Will be Absent, Minimized or Managed (Cardiac: Heart Failure)  Outcome: Ongoing (interventions implemented as appropriate)   09/17/19 1459   Goal/Outcome Evaluation   Problems Assessed (Heart Failure) all   Problems Present (Heart Failure) situational response

## 2019-09-17 NOTE — CONSULTS
Patient given CRbrochure;discussed salt consuption and HHD; CAD; med compliance, is interested in coming to CR,

## 2019-09-17 NOTE — PROGRESS NOTES
"NEPHROLOGY PROGRESS NOTE    [facility]  [phone]  Heide Reno MD      Patient Care Team:  Zev Li MD as PCP - General (Family Medicine)      Provider:  Heide Reno MD  Patient Name: Devaughn Valdez  :  1955    SUBJECTIVE:  F/u MARINA/CKD  No complaints. No angina.BP up    Medication:    Acetylcysteine 1,200 mg Oral BID   aspirin 81 mg Oral Daily   atorvastatin 40 mg Oral Nightly   clopidogrel 75 mg Oral Daily   docusate sodium 100 mg Oral BID   famotidine 20 mg Oral Daily   hydrALAZINE 50 mg Oral Q12H   isosorbide mononitrate 30 mg Oral Q24H   metoprolol tartrate 25 mg Oral BID       nitroglycerin 10-50 mcg/min Last Rate: Stopped (19)   sodium chloride 60 mL/hr Last Rate: Stopped (19)       OBJECTIVE    Vital Sign Min/Max for last 24 hours  Temp  Min: 97.6 °F (36.4 °C)  Max: 98.3 °F (36.8 °C)   BP  Min: 116/65  Max: 180/96   Pulse  Min: 60  Max: 93   Resp  Min: 17  Max: 17   SpO2  Min: 92 %  Max: 99 %   No Data Recorded   Weight  Min: 86.7 kg (191 lb 2.2 oz)  Max: 88.7 kg (195 lb 8.8 oz)     Flowsheet Rows      First Filed Value   Admission Height  172.7 cm (68\") Documented at 2019   Admission Weight  87 kg (191 lb 12.8 oz) Documented at 2019 0628          I/O this shift:  In: 320 [P.O.:320]  Out: 325 [Urine:325]  I/O last 3 completed shifts:  In: 260 [P.O.:260]  Out: 725 [Urine:725]    Physical Exam:  General Appearance: alert, appears stated age and cooperative  Head: normocephalic, without obvious abnormality and atraumatic  Eyes: conjunctivae and sclerae normal and no icterus  Neck: supple and no JVD  Lungs: clear to auscultation and respirations regular  Heart: regular rhythm & normal rate and normal S1, S2 +DAVID  Chest: Wall no abnormalities observed  Abdomen: normal bowel sounds and soft non-tender  Extremities: moves extremities well, no edema, no cyanosis and no redness  Skin: no bleeding, bruising or rash, turgor " normal, color normal and no leasions noted  Neurologic: Alert, and oriented. No focal deficits    Labs:    WBC WBC   Date Value Ref Range Status   09/17/2019 7.00 3.40 - 10.80 10*3/mm3 Final   09/16/2019 7.80 3.40 - 10.80 10*3/mm3 Final   09/15/2019 6.90 3.40 - 10.80 10*3/mm3 Final      HGB Hemoglobin   Date Value Ref Range Status   09/17/2019 13.1 13.0 - 17.7 g/dL Final   09/16/2019 13.3 13.0 - 17.7 g/dL Final   09/15/2019 13.1 13.0 - 17.7 g/dL Final      HCT Hematocrit   Date Value Ref Range Status   09/17/2019 39.4 37.5 - 51.0 % Final   09/16/2019 39.0 37.5 - 51.0 % Final   09/15/2019 38.6 37.5 - 51.0 % Final      Platlets No results found for: LABPLAT   MCV MCV   Date Value Ref Range Status   09/17/2019 90.2 79.0 - 97.0 fL Final   09/16/2019 89.0 79.0 - 97.0 fL Final   09/15/2019 88.6 79.0 - 97.0 fL Final          Sodium Sodium   Date Value Ref Range Status   09/17/2019 142 136 - 144 mmol/L Final   09/16/2019 140 136 - 144 mmol/L Final   09/15/2019 135 (L) 136 - 144 mmol/L Final      Potassium Potassium   Date Value Ref Range Status   09/17/2019 4.0 3.6 - 5.1 mmol/L Final   09/16/2019 3.8 3.6 - 5.1 mmol/L Final   09/15/2019 3.9 3.6 - 5.1 mmol/L Final      Chloride Chloride   Date Value Ref Range Status   09/17/2019 108 101 - 111 mmol/L Final   09/16/2019 110 101 - 111 mmol/L Final   09/15/2019 104 101 - 111 mmol/L Final      CO2 CO2   Date Value Ref Range Status   09/17/2019 25.0 22.0 - 32.0 mmol/L Final   09/16/2019 22.0 22.0 - 32.0 mmol/L Final   09/15/2019 23.0 22.0 - 32.0 mmol/L Final      BUN BUN   Date Value Ref Range Status   09/17/2019 13 8 - 20 mg/dL Final   09/16/2019 15 8 - 20 mg/dL Final   09/15/2019 17 8 - 20 mg/dL Final      Creatinine Creatinine   Date Value Ref Range Status   09/17/2019 1.00 0.70 - 1.20 mg/dL Final   09/16/2019 1.10 0.70 - 1.20 mg/dL Final   09/15/2019 1.00 0.70 - 1.20 mg/dL Final      Calcium Calcium   Date Value Ref Range Status   09/17/2019 8.8 (L) 8.9 - 10.3 mg/dL Final    09/16/2019 8.7 (L) 8.9 - 10.3 mg/dL Final   09/15/2019 8.5 (L) 8.9 - 10.3 mg/dL Final      PO4 No components found for: PO4   Albumin Albumin   Date Value Ref Range Status   09/17/2019 3.20 (L) 3.50 - 4.80 g/dL Final   09/16/2019 3.30 (L) 3.50 - 4.80 g/dL Final   09/15/2019 3.20 (L) 3.50 - 4.80 g/dL Final      Magnesium Magnesium   Date Value Ref Range Status   09/17/2019 1.8 1.8 - 2.5 mg/dL Final   09/15/2019 1.9 1.8 - 2.5 mg/dL Final      Uric Acid No components found for: URIC ACID     Imaging Results (last 72 hours)     Procedure Component Value Units Date/Time    US Renal Bilateral [[id number]] Collected:  09/13/19 2059     Updated:  09/13/19 2103    Narrative:       Examination:      Date of Exam: 9/13/2019 7:45 PM     Indication: acute renal failure; R06.01-Orthopnea; I42.0-Dilated  cardiomyopathy; I50.21-Acute systolic (congestive) heart failure;  I10-Essential (primary) hypertension.     Comparison: None available.     Technique: Grayscale and color Doppler ultrasound evaluation of the  kidneys and urinary bladder was performed     Findings:  The right kidney is about 11 cm in length by 4.7 x 7.5 seen.     The left kidney is about 10.5 cm in length by 4 x 5 cm.        Renal cortical thickness and echogenicity appear within normal. Neither  kidney is hydronephrotic. There is color flow to each kidney.      Limited exam of the bladder shows a volume of 204 cc. The prostate is  enlarged and lobulated in shape. It measures about 4 cm diameter.       Impression:          1. Normal appearance of kidneys.  2. Mild prostatic enlargement.     Electronically Signed By-Saniya Dejesus On:9/13/2019 9:01 PM  This report was finalized on [id number] by  Saniya Dejesus, .          Results for orders placed in visit on 08/15/19   XR Chest PA & Lateral       Results for orders placed during the hospital encounter of 09/13/19   Duplex Renal Artery - Bilateral Complete CAR    Narrative · Normal right renal artery.  · Normal  left renal artery.            ASSESSMENT / PLAN      Orthopnea    Essential hypertension    Dilated cardiomyopathy (CMS/HCC)    Acute systolic congestive heart failure (CMS/HCC)    Coronary artery disease involving native coronary artery of native heart with angina pectoris (CMS/HCC)    Non-rheumatic mitral regurgitation    Nonrheumatic aortic valve insufficiency    1. ARF/MARINA/CRF/CKD STG 3--- +Nonoliguric. ARF/MARINA is resolved. +very mild ARF/MARINA on top of what appears to be CRF/CKDwith baseline serum creatinine of 1.1mg/dl. Scr currently 1.90mg/dl. +ARF/MARINA was secondary to prerenal state/intravascular volume depletion with concomitant use of ACE-I in form of lisinopril. Stopped lisinopril.   No NSAIDs. Premedicated for cath today. Patient was explained the risks of IV dye he was exposed to today and I will given mucomyst and IVF post procedure.      2. CAD S/P IMPELLA ASSISTED PCI-------per , Cardiology     3. VALVULAR HEART DISEASE/CARDIOMYOPATHY--- EF reportedly 20%. Off IVFs     4. HTN WITH CKD---- No ACE/ARB/DRI/Diuretics at this time. Agree with addition of Hydralazine for afterload reduction. BP up a little. D/C IVFs and adjust meds     5. PUD PROPHYLAXIS--- Started renal dose pepcid.     6. DVT PROPHYLAXIS--- SCDs    7. HYPOMAGNESEMIA------Replace IV      Heide Reno MD  [facility]  [phone]  09/17/19  6:54 AM

## 2019-09-18 ENCOUNTER — NURSE TRIAGE (OUTPATIENT)
Dept: CALL CENTER | Facility: HOSPITAL | Age: 64
End: 2019-09-18

## 2019-09-18 ENCOUNTER — TELEPHONE (OUTPATIENT)
Dept: CALL CENTER | Facility: HOSPITAL | Age: 64
End: 2019-09-18

## 2019-09-18 VITALS
SYSTOLIC BLOOD PRESSURE: 135 MMHG | BODY MASS INDEX: 27.36 KG/M2 | HEART RATE: 75 BPM | TEMPERATURE: 97.6 F | OXYGEN SATURATION: 98 % | RESPIRATION RATE: 20 BRPM | HEIGHT: 70 IN | DIASTOLIC BLOOD PRESSURE: 62 MMHG | WEIGHT: 191.14 LBS

## 2019-09-18 LAB
ALBUMIN SERPL-MCNC: 3.2 G/DL (ref 3.5–4.8)
ANION GAP SERPL CALCULATED.3IONS-SCNC: 11 MMOL/L (ref 5–15)
BASOPHILS # BLD AUTO: 0 10*3/MM3 (ref 0–0.2)
BASOPHILS NFR BLD AUTO: 0.4 % (ref 0–1.5)
BUN BLD-MCNC: 12 MG/DL (ref 8–20)
BUN/CREAT SERPL: 12 (ref 6.2–20.3)
CALCIUM SPEC-SCNC: 8.5 MG/DL (ref 8.9–10.3)
CHLORIDE SERPL-SCNC: 109 MMOL/L (ref 101–111)
CO2 SERPL-SCNC: 25 MMOL/L (ref 22–32)
CREAT BLD-MCNC: 1 MG/DL (ref 0.7–1.2)
DEPRECATED RDW RBC AUTO: 45.1 FL (ref 37–54)
EOSINOPHIL # BLD AUTO: 0.2 10*3/MM3 (ref 0–0.4)
EOSINOPHIL NFR BLD AUTO: 3 % (ref 0.3–6.2)
ERYTHROCYTE [DISTWIDTH] IN BLOOD BY AUTOMATED COUNT: 14.2 % (ref 12.3–15.4)
GFR SERPL CREATININE-BSD FRML MDRD: 76 ML/MIN/1.73
GLUCOSE BLD-MCNC: 112 MG/DL (ref 65–99)
HCT VFR BLD AUTO: 38.7 % (ref 37.5–51)
HGB BLD-MCNC: 13 G/DL (ref 13–17.7)
LYMPHOCYTES # BLD AUTO: 1.7 10*3/MM3 (ref 0.7–3.1)
LYMPHOCYTES NFR BLD AUTO: 22.9 % (ref 19.6–45.3)
MAGNESIUM SERPL-MCNC: 1.9 MG/DL (ref 1.8–2.5)
MCH RBC QN AUTO: 30.3 PG (ref 26.6–33)
MCHC RBC AUTO-ENTMCNC: 33.6 G/DL (ref 31.5–35.7)
MCV RBC AUTO: 90.2 FL (ref 79–97)
MONOCYTES # BLD AUTO: 0.8 10*3/MM3 (ref 0.1–0.9)
MONOCYTES NFR BLD AUTO: 10.4 % (ref 5–12)
NEUTROPHILS # BLD AUTO: 4.7 10*3/MM3 (ref 1.7–7)
NEUTROPHILS NFR BLD AUTO: 63.3 % (ref 42.7–76)
NRBC BLD AUTO-RTO: 0 /100 WBC (ref 0–0.2)
PHOSPHATE SERPL-MCNC: 3.9 MG/DL (ref 2.4–4.7)
PLATELET # BLD AUTO: 93 10*3/MM3 (ref 140–450)
PMV BLD AUTO: 11.6 FL (ref 6–12)
POTASSIUM BLD-SCNC: 4 MMOL/L (ref 3.6–5.1)
RBC # BLD AUTO: 4.29 10*6/MM3 (ref 4.14–5.8)
SODIUM BLD-SCNC: 141 MMOL/L (ref 136–144)
WBC NRBC COR # BLD: 7.5 10*3/MM3 (ref 3.4–10.8)

## 2019-09-18 PROCEDURE — 80069 RENAL FUNCTION PANEL: CPT | Performed by: INTERNAL MEDICINE

## 2019-09-18 PROCEDURE — 99238 HOSP IP/OBS DSCHRG MGMT 30/<: CPT | Performed by: INTERNAL MEDICINE

## 2019-09-18 PROCEDURE — 85025 COMPLETE CBC W/AUTO DIFF WBC: CPT | Performed by: INTERNAL MEDICINE

## 2019-09-18 PROCEDURE — 83735 ASSAY OF MAGNESIUM: CPT | Performed by: INTERNAL MEDICINE

## 2019-09-18 RX ORDER — ISOSORBIDE MONONITRATE 60 MG/1
60 TABLET, EXTENDED RELEASE ORAL
Qty: 30 TABLET | Refills: 2 | Status: SHIPPED | OUTPATIENT
Start: 2019-09-19 | End: 2019-11-21 | Stop reason: SDUPTHER

## 2019-09-18 RX ORDER — SPIRONOLACTONE 25 MG/1
12.5 TABLET ORAL DAILY
Status: DISCONTINUED | OUTPATIENT
Start: 2019-09-18 | End: 2019-09-18 | Stop reason: HOSPADM

## 2019-09-18 RX ORDER — SPIRONOLACTONE 25 MG/1
12.5 TABLET ORAL DAILY
Qty: 30 TABLET | Refills: 2 | Status: SHIPPED | OUTPATIENT
Start: 2019-09-18 | End: 2020-08-19

## 2019-09-18 RX ORDER — HYDRALAZINE HYDROCHLORIDE 50 MG/1
50 TABLET, FILM COATED ORAL EVERY 8 HOURS SCHEDULED
Qty: 90 TABLET | Refills: 0 | Status: SHIPPED | OUTPATIENT
Start: 2019-09-18 | End: 2019-09-25

## 2019-09-18 RX ORDER — CLOPIDOGREL BISULFATE 75 MG/1
75 TABLET ORAL DAILY
Qty: 30 TABLET | Refills: 6 | Status: SHIPPED | OUTPATIENT
Start: 2019-09-19 | End: 2020-04-09

## 2019-09-18 RX ORDER — ATORVASTATIN CALCIUM 40 MG/1
40 TABLET, FILM COATED ORAL NIGHTLY
Qty: 30 TABLET | Refills: 5 | Status: SHIPPED | OUTPATIENT
Start: 2019-09-18 | End: 2020-02-21 | Stop reason: SDUPTHER

## 2019-09-18 RX ORDER — ASPIRIN 81 MG/1
81 TABLET ORAL DAILY
Qty: 30 TABLET | Refills: 5 | Status: SHIPPED | OUTPATIENT
Start: 2019-09-19 | End: 2020-09-21 | Stop reason: SDUPTHER

## 2019-09-18 RX ADMIN — ISOSORBIDE MONONITRATE 60 MG: 60 TABLET, EXTENDED RELEASE ORAL at 08:02

## 2019-09-18 RX ADMIN — CLOPIDOGREL BISULFATE 75 MG: 75 TABLET ORAL at 08:03

## 2019-09-18 RX ADMIN — HYDRALAZINE HYDROCHLORIDE 50 MG: 25 TABLET, FILM COATED ORAL at 06:41

## 2019-09-18 RX ADMIN — FAMOTIDINE 20 MG: 20 TABLET, FILM COATED ORAL at 08:02

## 2019-09-18 RX ADMIN — METOPROLOL TARTRATE 25 MG: 25 TABLET, FILM COATED ORAL at 08:02

## 2019-09-18 RX ADMIN — ASPIRIN 81 MG: 81 TABLET, DELAYED RELEASE ORAL at 08:03

## 2019-09-18 NOTE — DISCHARGE SUMMARY
Date of Admission: 9/13/2019    Date of Discharge:  9/18/2019    Length of stay:  LOS: 5 days     Admission Diagnosis:    Orthopnea    Essential hypertension    Dilated cardiomyopathy (CMS/HCC)    Acute systolic congestive heart failure (CMS/HCC)    Coronary artery disease involving native coronary artery of native heart with angina pectoris (CMS/HCC)    Non-rheumatic mitral regurgitation    Nonrheumatic aortic valve insufficiency  Severe cardiomyopathy  Newly diagnosed cardiomyopathy      Discharge Diagnosis:       Orthopnea    Essential hypertension    Dilated cardiomyopathy (CMS/HCC)    Acute systolic congestive heart failure (CMS/HCC)    Coronary artery disease involving native coronary artery of native heart with angina pectoris (CMS/HCC)    Non-rheumatic mitral regurgitation    Nonrheumatic aortic valve insufficiency  Severe cardiomyopathy  Newly diagnosed cardiomyopathy  Severe two-vessel coronary artery disease  Successful PCI of the LAD and right coronary artery with placement of drug-eluting stent  Impella assisted PCI  Newly diagnosed renal failure  Hypertension  Hyperlipidemia    Successful PCI mid LAD with placement of a drug-eluting stent  Successful PCI of the mid right coronary artery with placement of a drug-eluting stent  Successful insertion of the Impella left ventricular percutaneous ventricular assist device  Successful removal of the Impella left ventricular percutaneous ventricular assist device      Presenting Problem/History of Present Illness  Active Hospital Problems    Diagnosis  POA   • Dilated cardiomyopathy (CMS/HCC) [I42.0]  Yes     Priority: High   • Acute systolic congestive heart failure (CMS/HCC) [I50.21]  Yes     Priority: High   • Coronary artery disease involving native coronary artery of native heart with angina pectoris (CMS/HCC) [I25.119]  Yes   • Non-rheumatic mitral regurgitation [I34.0]  Yes   • Nonrheumatic aortic valve insufficiency [I35.1]  Yes   • Essential  hypertension [I10]  Yes   • Orthopnea [R06.01]  Yes      Resolved Hospital Problems   No resolved problems to display.          Hospital Course  Patient is a 64 y.o. male presented with presented with newly diagnosed cardiomyopathy.    Severe LV dysfunction  During the hospital course patient had a cardiac catheterization that showed severe two-vessel coronary artery disease with high risk features patient underwent a successful Impella guided PCI of the LAD and right coronary artery with placement of drug-eluting stent  Patient did well post-procedure  Renal function remained stable  Hemodynamic Andi patient is stable  Groin site looks good with no hematoma or bleeding    1. CORONARY ANGIOGRAPHY:      LAD: Mid 90% stenosis diagonal bifurcation  FERCHO-3 flow before and after the intervention in the LAD and also in the diagonal branch  Lesion length is 10 mm  Stented with a 3.0 x 18 mm Xience drug-eluting stent     RCA: 70% stenosis in the mid right coronary artery  FERCHO-3 flow before and after the intervention  Lesion length is 15 mm  Stented with a 3.5 x 18 mm Xience drug-eluting stent     SUMMARY:   Successful PCI mid LAD with placement of a drug-eluting stent  Successful PCI of the mid right coronary artery with placement of a drug-eluting stent  Successful insertion of the Impella left ventricular percutaneous ventricular assist device  Successful removal of the Impella left ventricular percutaneous ventricular assist device  Hemostasis of the left common femoral artery with a Perclose closure device     RECOMMENDATIONS:   Aspirin 81 mg p.o. once a day  Plavix 75 mg p.o. once a day  Observe patient in CVCU bed overnight  Continued aggressive risk factor modification  Medical therapy for cardiomyopathy  Test results discussed with the patient and family      Past Medical History:     Past Medical History:   Diagnosis Date   • Cardiomyopathy (CMS/HCC)     e.f 20%   • Hypertension        Past Surgical History:      Past Surgical History:   Procedure Laterality Date   • CARDIAC CATHETERIZATION N/A 9/13/2019    Procedure: Left Heart Cath and right heart cath;  Surgeon: Griselda Kowalski MD;  Location: [facility] CATH INVASIVE LOCATION;  Service: Cardiovascular   • CARDIAC CATHETERIZATION N/A 9/16/2019    Procedure: PCI to LAD;  Surgeon: Griselda Kowalski MD;  Location: [facility] CATH INVASIVE LOCATION;  Service: Cardiovascular   • CARDIAC ELECTROPHYSIOLOGY PROCEDURE  9/16/2019    Procedure: Impella Insertion;  Surgeon: Griselda Kowalski MD;  Location: [facility] CATH INVASIVE LOCATION;  Service: Cardiovascular   • CARDIAC ELECTROPHYSIOLOGY PROCEDURE N/A 9/16/2019    Procedure: Impella Removal;  Surgeon: Griselda Kowalski MD;  Location: [facility] CATH INVASIVE LOCATION;  Service: Cardiovascular   • LA RT/LT HEART CATHETERS N/A 9/16/2019    Procedure: Percutaneous Coronary Intervention;  Surgeon: Griselda Kowalski MD;  Location: [facility] CATH INVASIVE LOCATION;  Service: Cardiovascular       Social History:   Social History     Socioeconomic History   • Marital status:      Spouse name: Not on file   • Number of children: Not on file   • Years of education: Not on file   • Highest education level: Not on file   Tobacco Use   • Smoking status: Never Smoker   • Smokeless tobacco: Never Used   Substance and Sexual Activity   • Alcohol use: No     Frequency: Never   • Drug use: No       Procedures Performed    1. CORONARY ANGIOGRAPHY:      LAD: Mid 90% stenosis diagonal bifurcation  FERCHO-3 flow before and after the intervention in the LAD and also in the diagonal branch  Lesion length is 10 mm  Stented with a 3.0 x 18 mm Xience drug-eluting stent     RCA: 70% stenosis in the mid right coronary artery  FERCHO-3 flow before and after the intervention  Lesion length is 15 mm  Stented with a 3.5 x 18 mm Xience drug-eluting stent     SUMMARY:   Successful PCI mid LAD with placement of a drug-eluting stent  Successful  PCI of the mid right coronary artery with placement of a drug-eluting stent  Successful insertion of the Impella left ventricular percutaneous ventricular assist device  Successful removal of the Impella left ventricular percutaneous ventricular assist device  Hemostasis of the left common femoral artery with a Perclose closure device     RECOMMENDATIONS:   Aspirin 81 mg p.o. once a day  Plavix 75 mg p.o. once a day  Observe patient in CVCU bed overnight  Continued aggressive risk factor modification  Medical therapy for cardiomyopathy  Test results discussed with the patient and family      Consults:   Consulting Physician(s)     Provider Relationship Specialty    Abrahan Reno MD Consulting Physician Nephrology          Pertinent Test Results:     Lab Results (last 72 hours)     Procedure Component Value Units Date/Time    Renal Function Panel [[id number]]  (Abnormal) Collected:  09/18/19 0324    Specimen:  Blood Updated:  09/18/19 0423     Glucose 112 mg/dL      BUN 12 mg/dL      Creatinine 1.00 mg/dL      Sodium 141 mmol/L      Potassium 4.0 mmol/L      Chloride 109 mmol/L      CO2 25.0 mmol/L      Calcium 8.5 mg/dL      Albumin 3.20 g/dL      Phosphorus 3.9 mg/dL      Anion Gap 11.0 mmol/L      BUN/Creatinine Ratio 12.0     eGFR Non African Amer 76 mL/min/1.73     Magnesium [[id number]]  (Normal) Collected:  09/18/19 0324    Specimen:  Blood Updated:  09/18/19 0423     Magnesium 1.9 mg/dL     CBC & Differential [[id number]] Collected:  09/18/19 0324    Specimen:  Blood Updated:  09/18/19 0402    Narrative:       The following orders were created for panel order CBC & Differential.  Procedure                               Abnormality         Status                     ---------                               -----------         ------                     CBC Auto Differential[[id number]]        Abnormal            Final result                 Please view results for these tests on the individual orders.     CBC Auto Differential [[id number]]  (Abnormal) Collected:  09/18/19 0324    Specimen:  Blood Updated:  09/18/19 0402     WBC 7.50 10*3/mm3      RBC 4.29 10*6/mm3      Hemoglobin 13.0 g/dL      Hematocrit 38.7 %      MCV 90.2 fL      MCH 30.3 pg      MCHC 33.6 g/dL      RDW 14.2 %      RDW-SD 45.1 fl      MPV 11.6 fL      Platelets 93 10*3/mm3      Neutrophil % 63.3 %      Lymphocyte % 22.9 %      Monocyte % 10.4 %      Eosinophil % 3.0 %      Basophil % 0.4 %      Neutrophils, Absolute 4.70 10*3/mm3      Lymphocytes, Absolute 1.70 10*3/mm3      Monocytes, Absolute 0.80 10*3/mm3      Eosinophils, Absolute 0.20 10*3/mm3      Basophils, Absolute 0.00 10*3/mm3      nRBC 0.0 /100 WBC     Lipid Panel [[id number]]  (Abnormal) Collected:  09/17/19 0442    Specimen:  Blood Updated:  09/17/19 0612     Total Cholesterol 135 mg/dL      Triglycerides 69 mg/dL      HDL Cholesterol 35 mg/dL      LDL Cholesterol  92 mg/dL      VLDL Cholesterol 13.8 mg/dL      LDL/HDL Ratio 2.46     Chol/HDL Ratio 3.86    Narrative:       The following guidelines have been recommended by the NCEP for Total Cholesterol, Total Triglycerides, LDL Cholesterol, and HDL Cholesterols    Total Cholesterol  Desirable:        <200 mg/dL  Borderline High:  200-239 mg/dL  High:             > or = 240 mg/dL    Total Triglyceride  Normal:           <150 mg/dL  Borderline High:  150-199 mg/dL  High:             200-499 mg/dL  Very High:        > or = 500 mg/dL    HDL Cholesterol  Low HDL:          <40 mg/dL  Normal:           40-60 mg/dL  Desirable:        >60 mg/dL    LDL Cholesterol  Optimal:          <100 mg/dL  Low Risk:         100-129 mg/dL  Borderline High:  130-159 mg/dL  High:             160-189 mg/dL  Very High:        > or = 190 mg/dL    The following ratios of LDL to HDL and Total cholesterol to HDL are for information only:    LDL/HDL Ratio  Desirable:        <5  Optimal:          < or = 3.5    Total Cholesterol/HDL Ratio  Low Risk:          3.3-4.4  Average Risk:     4.4-7.1  Medium Risk:      7.1-11  High Risk:        >11       Calcium, Ionized [[id number]]  (Normal) Collected:  09/17/19 0442    Specimen:  Blood Updated:  09/17/19 0604     Ionized Calcium 1.24 mmol/L     Comprehensive Metabolic Panel [[id number]]  (Abnormal) Collected:  09/17/19 0442    Specimen:  Blood Updated:  09/17/19 0556     Glucose 102 mg/dL      BUN 13 mg/dL      Creatinine 1.00 mg/dL      Sodium 142 mmol/L      Potassium 4.0 mmol/L      Chloride 108 mmol/L      CO2 25.0 mmol/L      Calcium 8.8 mg/dL      Total Protein 6.4 g/dL      Albumin 3.20 g/dL      ALT (SGPT) 18 U/L      AST (SGOT) 22 U/L      Alkaline Phosphatase 49 U/L      Total Bilirubin 1.0 mg/dL      eGFR Non African Amer 76 mL/min/1.73      Globulin 3.2 gm/dL      A/G Ratio 1.0 g/dL      BUN/Creatinine Ratio 13.0     Anion Gap 13.0 mmol/L     Magnesium [[id number]]  (Normal) Collected:  09/17/19 0442    Specimen:  Blood Updated:  09/17/19 0556     Magnesium 1.8 mg/dL     Phosphorus [[id number]]  (Normal) Collected:  09/17/19 0442    Specimen:  Blood Updated:  09/17/19 0556     Phosphorus 3.9 mg/dL     CBC & Differential [[id number]] Collected:  09/17/19 0442    Specimen:  Blood Updated:  09/17/19 0526    Narrative:       The following orders were created for panel order CBC & Differential.  Procedure                               Abnormality         Status                     ---------                               -----------         ------                     CBC Auto Differential[[id number]]        Abnormal            Final result                 Please view results for these tests on the individual orders.    CBC Auto Differential [[id number]]  (Abnormal) Collected:  09/17/19 0442    Specimen:  Blood Updated:  09/17/19 0526     WBC 7.00 10*3/mm3      RBC 4.37 10*6/mm3      Hemoglobin 13.1 g/dL      Hematocrit 39.4 %      MCV 90.2 fL      MCH 30.0 pg      MCHC 33.2 g/dL      RDW 13.9 %      RDW-SD 44.2 fl      MPV  11.2 fL      Platelets 83 10*3/mm3      Neutrophil % 64.6 %      Lymphocyte % 23.5 %      Monocyte % 9.0 %      Eosinophil % 2.5 %      Basophil % 0.4 %      Neutrophils, Absolute 4.60 10*3/mm3      Lymphocytes, Absolute 1.70 10*3/mm3      Monocytes, Absolute 0.60 10*3/mm3      Eosinophils, Absolute 0.20 10*3/mm3      Basophils, Absolute 0.00 10*3/mm3      nRBC 0.0 /100 WBC     POC Activated Clotting Time [[id number]]  (Abnormal) Collected:  09/16/19 1153    Specimen:  Blood Updated:  09/16/19 1325     Activated Clotting Time  175 Seconds      Comment: Serial Number: [id number]:  [id number]       POC Activated Clotting Time [[id number]]  (Abnormal) Collected:  09/16/19 1320    Specimen:  Blood Updated:  09/16/19 1325     Activated Clotting Time  147 Seconds      Comment: Serial Number: [id number]:  [id number]       Protein Elec + Interp, Serum [[id number]] Collected:  09/13/19 1432    Specimen:  Blood Updated:  09/16/19 1308     Total Protein 6.4 g/dL      Albumin 3.2 g/dL      Alpha-1-Globulin 0.2 g/dL      Alpha-2-Globulin 0.7 g/dL      Beta Globulin 1.1 g/dL      Gamma Globulin 1.2 g/dL      M-Faraz Not Observed g/dL      Globulin 3.2 g/dL      A/G Ratio 1.0     Please note Comment     Comment: Protein electrophoresis scan will follow via computer, mail, or   delivery.        SPE Interpretation Comment     Comment: The SPE pattern appears essentially unremarkable. Evidence of  monoclonal protein is not apparent.       Narrative:       Performed at:  [address] [place] [address]  [id number]  : Nathen Ingram PhD, Phone:  [phone]    POC Activated Clotting Time [[id number]]  (Abnormal) Collected:  09/16/19 0957    Specimen:  Blood Updated:  09/16/19 1048     Activated Clotting Time  230 Seconds      Comment: Serial Number: [id number]:  [id number]       Renal Function Panel [[id number]]  (Abnormal) Collected:  09/16/19 0202    Specimen:  Blood Updated:  09/16/19 0328      Glucose 112 mg/dL      BUN 15 mg/dL      Creatinine 1.10 mg/dL      Sodium 140 mmol/L      Potassium 3.8 mmol/L      Chloride 110 mmol/L      CO2 22.0 mmol/L      Calcium 8.7 mg/dL      Albumin 3.30 g/dL      Phosphorus 3.9 mg/dL      Anion Gap 11.8 mmol/L      BUN/Creatinine Ratio 13.6     eGFR Non African Amer 68 mL/min/1.73     aPTT [[id number]]  (Normal) Collected:  09/16/19 0202    Specimen:  Blood Updated:  09/16/19 0328     PTT 25.3 seconds     Protime-INR [[id number]]  (Normal) Collected:  09/16/19 0202    Specimen:  Blood Updated:  09/16/19 0328     Protime 10.7 Seconds      INR 1.05    CBC & Differential [[id number]] Collected:  09/16/19 0202    Specimen:  Blood Updated:  09/16/19 0310    Narrative:       The following orders were created for panel order CBC & Differential.  Procedure                               Abnormality         Status                     ---------                               -----------         ------                     CBC Auto Differential[[id number]]        Abnormal            Final result                 Please view results for these tests on the individual orders.    CBC Auto Differential [[id number]]  (Abnormal) Collected:  09/16/19 0202    Specimen:  Blood Updated:  09/16/19 0310     WBC 7.80 10*3/mm3      RBC 4.38 10*6/mm3      Hemoglobin 13.3 g/dL      Hematocrit 39.0 %      MCV 89.0 fL      MCH 30.4 pg      MCHC 34.2 g/dL      RDW 14.0 %      RDW-SD 43.3 fl      MPV 10.9 fL      Platelets 77 10*3/mm3      Neutrophil % 66.0 %      Lymphocyte % 21.7 %      Monocyte % 9.2 %      Eosinophil % 2.8 %      Basophil % 0.3 %      Neutrophils, Absolute 5.10 10*3/mm3      Lymphocytes, Absolute 1.70 10*3/mm3      Monocytes, Absolute 0.70 10*3/mm3      Eosinophils, Absolute 0.20 10*3/mm3      Basophils, Absolute 0.00 10*3/mm3      nRBC 0.0 /100 WBC           Results for orders placed during the hospital encounter of 09/13/19   Adult Transesophageal Echo (JENNY) W/ Cont if Necessary Per  "Protocol    Narrative · Left ventricular systolic function is severely decreased.  · Estimated EF appears to be in the range of less than 20%.  · Moderate aortic valve regurgitation is present.  · Left atrial cavity size is borderline dilated.  · The left ventricular cavity is severely dilated.  · Right ventricular cavity is borderline dilated.  · Mild tricuspid valve regurgitation is present.          Imaging Results (all)     Procedure Component Value Units Date/Time    US Renal Bilateral [[id number]] Collected:  09/13/19 2059     Updated:  09/13/19 2103    Narrative:       Examination:      Date of Exam: 9/13/2019 7:45 PM     Indication: acute renal failure; R06.01-Orthopnea; I42.0-Dilated  cardiomyopathy; I50.21-Acute systolic (congestive) heart failure;  I10-Essential (primary) hypertension.     Comparison: None available.     Technique: Grayscale and color Doppler ultrasound evaluation of the  kidneys and urinary bladder was performed     Findings:  The right kidney is about 11 cm in length by 4.7 x 7.5 seen.     The left kidney is about 10.5 cm in length by 4 x 5 cm.        Renal cortical thickness and echogenicity appear within normal. Neither  kidney is hydronephrotic. There is color flow to each kidney.      Limited exam of the bladder shows a volume of 204 cc. The prostate is  enlarged and lobulated in shape. It measures about 4 cm diameter.       Impression:          1. Normal appearance of kidneys.  2. Mild prostatic enlargement.     Electronically Signed By-Saniya Dejesus On:9/13/2019 9:01 PM  This report was finalized on [id number] by  Saniya Dejesus, .            Condition on Discharge: Stable    Vital Signs  Visit Vitals  /71   Pulse 69   Temp 98 °F (36.7 °C)   Resp 20   Ht 177.8 cm (70\")   Wt 86.7 kg (191 lb 2.2 oz)   SpO2 98%   BMI 27.43 kg/m²       Physical Exam:    Physical Exam   Constitutional: He is oriented to person, place, and time. He appears well-developed and well-nourished. "   HENT:   Head: Normocephalic and atraumatic.   Eyes: Conjunctivae are normal. Pupils are equal, round, and reactive to light.   Neck: Normal range of motion. Neck supple. No thyromegaly present.   Cardiovascular: Normal rate, regular rhythm, S1 normal, S2 normal and intact distal pulses. PMI is displaced.   Murmur heard.   Midsystolic murmur is present with a grade of 2/6.  Pulmonary/Chest: Effort normal and breath sounds normal.   Abdominal: Soft. Bowel sounds are normal.   Musculoskeletal: He exhibits no edema.   Neurological: He is alert and oriented to person, place, and time.   Skin: Skin is warm.   Nursing note and vitals reviewed.        Discharge Disposition  Home or Self Care    Discharge Medications     Discharge Medications      New Medications      Instructions Start Date   aspirin 81 MG EC tablet   81 mg, Oral, Daily   Start Date:  9/19/2019     atorvastatin 40 MG tablet  Commonly known as:  LIPITOR   40 mg, Oral, Nightly      clopidogrel 75 MG tablet  Commonly known as:  PLAVIX   75 mg, Oral, Daily   Start Date:  9/19/2019     hydrALAZINE 50 MG tablet  Commonly known as:  APRESOLINE   50 mg, Oral, Every 8 Hours Scheduled      isosorbide mononitrate 60 MG 24 hr tablet  Commonly known as:  IMDUR   60 mg, Oral, Every 24 Hours Scheduled   Start Date:  9/19/2019        Continue These Medications      Instructions Start Date   metoprolol tartrate 25 MG tablet  Commonly known as:  LOPRESSOR   25 mg, Oral, 2 Times Daily         Stop These Medications    lisinopril 40 MG tablet  Commonly known as:  PRINIVIL,ZESTRIL            Discharge Diet: Low-salt diet    Activity at Discharge: Post PCI care    Follow-up Appointments  Future Appointments   Date Time Provider Department Center   9/24/2019  8:30 AM Mayra Najera MD [facility]   9/25/2019  8:00 AM Zev Li MD [facility] None   10/10/2019  2:00 PM Griselda Kowalski MD [facility] None     Additional Instructions for the Follow-ups  that You Need to Schedule     Basic Metabolic Panel    Sep 25, 2019 (Approximate)            Test Results Pending at Discharge       Risk for Readmission (LACE) Score: 9 (9/18/2019  6:00 AM)      Plan  Continue dual antiplatelet therapy consisting of aspirin 81 mg daily and Plavix 75 mg daily  Continue aggressive risk factor modification  Medical therapy for cardiomyopathy  Continue aspirin, statin, beta-blocker, long-acting nitroglycerin  Hold ACE inhibitors for now and start patient on Entresto next week in the office  Resume spironolactone 12.5 mg p.o. once a day  Follow-up in office in 1 week    Griselda Kowalski MD  09/18/19  9:25 AM

## 2019-09-18 NOTE — PROGRESS NOTES
"NEPHROLOGY PROGRESS NOTE    [facility]  [phone]  Heide Reno MD      Patient Care Team:  Zev Li MD as PCP - General (Family Medicine)      Provider:  Heide Reno MD  Patient Name: Devaughn Valdez  :  1955    SUBJECTIVE:  F/u MARINA/CKD  No complaints. No angina. BP better.     Medication:    aspirin 81 mg Oral Daily   atorvastatin 40 mg Oral Nightly   clopidogrel 75 mg Oral Daily   docusate sodium 100 mg Oral BID   famotidine 20 mg Oral Daily   hydrALAZINE 50 mg Oral Q8H   isosorbide mononitrate 60 mg Oral Q24H   metoprolol tartrate 25 mg Oral BID       nitroglycerin 10-50 mcg/min Last Rate: Stopped (19 1900)       OBJECTIVE    Vital Sign Min/Max for last 24 hours  Temp  Min: 97.5 °F (36.4 °C)  Max: 98.5 °F (36.9 °C)   BP  Min: 119/67  Max: 194/89   Pulse  Min: 63  Max: 93   Resp  Min: 18  Max: 20   SpO2  Min: 93 %  Max: 99 %   No Data Recorded   No Data Recorded     Flowsheet Rows      First Filed Value   Admission Height  172.7 cm (68\") Documented at 201928   Admission Weight  87 kg (191 lb 12.8 oz) Documented at 2019 0628          No intake/output data recorded.  I/O last 3 completed shifts:  In: 1236 [P.O.:1060; I.V.:176]  Out: 2825 [Urine:2825]    Physical Exam:  General Appearance: alert, appears stated age and cooperative  Head: normocephalic, without obvious abnormality and atraumatic  Eyes: conjunctivae and sclerae normal and no icterus  Neck: supple and no JVD  Lungs: clear to auscultation and respirations regular  Heart: regular rhythm & normal rate and normal S1, S2 +DAVID  Chest: Wall no abnormalities observed  Abdomen: normal bowel sounds and soft non-tender  Extremities: moves extremities well, no edema, no cyanosis and no redness  Skin: no bleeding, bruising or rash, turgor normal, color normal and no leasions noted  Neurologic: Alert, and oriented. No focal deficits    Labs:    WBC WBC   Date Value Ref Range Status "   09/18/2019 7.50 3.40 - 10.80 10*3/mm3 Final   09/17/2019 7.20 3.40 - 10.80 10*3/mm3 Final   09/17/2019 7.00 3.40 - 10.80 10*3/mm3 Final   09/16/2019 7.80 3.40 - 10.80 10*3/mm3 Final      HGB Hemoglobin   Date Value Ref Range Status   09/18/2019 13.0 13.0 - 17.7 g/dL Final   09/17/2019 12.7 (L) 13.0 - 17.7 g/dL Final   09/17/2019 13.1 13.0 - 17.7 g/dL Final   09/16/2019 13.3 13.0 - 17.7 g/dL Final      HCT Hematocrit   Date Value Ref Range Status   09/18/2019 38.7 37.5 - 51.0 % Final   09/17/2019 37.8 37.5 - 51.0 % Final   09/17/2019 39.4 37.5 - 51.0 % Final   09/16/2019 39.0 37.5 - 51.0 % Final      Platlets No results found for: LABPLAT   MCV MCV   Date Value Ref Range Status   09/18/2019 90.2 79.0 - 97.0 fL Final   09/17/2019 89.1 79.0 - 97.0 fL Final   09/17/2019 90.2 79.0 - 97.0 fL Final   09/16/2019 89.0 79.0 - 97.0 fL Final          Sodium Sodium   Date Value Ref Range Status   09/18/2019 141 136 - 144 mmol/L Final   09/17/2019 142 136 - 144 mmol/L Final   09/16/2019 140 136 - 144 mmol/L Final      Potassium Potassium   Date Value Ref Range Status   09/18/2019 4.0 3.6 - 5.1 mmol/L Final   09/17/2019 4.0 3.6 - 5.1 mmol/L Final   09/16/2019 3.8 3.6 - 5.1 mmol/L Final      Chloride Chloride   Date Value Ref Range Status   09/18/2019 109 101 - 111 mmol/L Final   09/17/2019 108 101 - 111 mmol/L Final   09/16/2019 110 101 - 111 mmol/L Final      CO2 CO2   Date Value Ref Range Status   09/18/2019 25.0 22.0 - 32.0 mmol/L Final   09/17/2019 25.0 22.0 - 32.0 mmol/L Final   09/16/2019 22.0 22.0 - 32.0 mmol/L Final      BUN BUN   Date Value Ref Range Status   09/18/2019 12 8 - 20 mg/dL Final   09/17/2019 13 8 - 20 mg/dL Final   09/16/2019 15 8 - 20 mg/dL Final      Creatinine Creatinine   Date Value Ref Range Status   09/18/2019 1.00 0.70 - 1.20 mg/dL Final   09/17/2019 1.00 0.70 - 1.20 mg/dL Final   09/16/2019 1.10 0.70 - 1.20 mg/dL Final      Calcium Calcium   Date Value Ref Range Status   09/18/2019 8.5 (L) 8.9 -  10.3 mg/dL Final   09/17/2019 8.8 (L) 8.9 - 10.3 mg/dL Final   09/16/2019 8.7 (L) 8.9 - 10.3 mg/dL Final      PO4 No components found for: PO4   Albumin Albumin   Date Value Ref Range Status   09/18/2019 3.20 (L) 3.50 - 4.80 g/dL Final   09/17/2019 3.20 (L) 3.50 - 4.80 g/dL Final   09/16/2019 3.30 (L) 3.50 - 4.80 g/dL Final      Magnesium Magnesium   Date Value Ref Range Status   09/18/2019 1.9 1.8 - 2.5 mg/dL Final   09/17/2019 1.8 1.8 - 2.5 mg/dL Final      Uric Acid No components found for: URIC ACID     Imaging Results (last 72 hours)     Procedure Component Value Units Date/Time    US Renal Bilateral [[id number]] Collected:  09/13/19 2059     Updated:  09/13/19 2103    Narrative:       Examination:      Date of Exam: 9/13/2019 7:45 PM     Indication: acute renal failure; R06.01-Orthopnea; I42.0-Dilated  cardiomyopathy; I50.21-Acute systolic (congestive) heart failure;  I10-Essential (primary) hypertension.     Comparison: None available.     Technique: Grayscale and color Doppler ultrasound evaluation of the  kidneys and urinary bladder was performed     Findings:  The right kidney is about 11 cm in length by 4.7 x 7.5 seen.     The left kidney is about 10.5 cm in length by 4 x 5 cm.        Renal cortical thickness and echogenicity appear within normal. Neither  kidney is hydronephrotic. There is color flow to each kidney.      Limited exam of the bladder shows a volume of 204 cc. The prostate is  enlarged and lobulated in shape. It measures about 4 cm diameter.       Impression:          1. Normal appearance of kidneys.  2. Mild prostatic enlargement.     Electronically Signed By-Saniya Dejesus On:9/13/2019 9:01 PM  This report was finalized on [id number] by  Saniya Dejesus, .          Results for orders placed in visit on 08/15/19   XR Chest PA & Lateral       Results for orders placed during the hospital encounter of 09/13/19   Duplex Renal Artery - Bilateral Complete CAR    Narrative · Normal right renal  artery.  · Normal left renal artery.            ASSESSMENT / PLAN      Orthopnea    Essential hypertension    Dilated cardiomyopathy (CMS/HCC)    Acute systolic congestive heart failure (CMS/HCC)    Coronary artery disease involving native coronary artery of native heart with angina pectoris (CMS/HCC)    Non-rheumatic mitral regurgitation    Nonrheumatic aortic valve insufficiency    1. ARF/MARINA/CRF/CKD STG 3--- +Nonoliguric. ARF/MARINA is resolved. +very mild ARF/MARINA on top of what appears to be CRF/CKDwith baseline serum creatinine of 1.1mg/dl. Scr currently 1.90mg/dl. +ARF/MARINA was secondary to prerenal state/intravascular volume depletion with concomitant use of ACE-I in form of lisinopril. Stopped lisinopril.   No NSAIDs. Premedicated for cath today. Patient was explained the risks of IV dye he was exposed to today and I will given mucomyst and IVF post procedure.      2. CAD S/P IMPELLA ASSISTED PCI-------per , Cardiology     3. VALVULAR HEART DISEASE/CARDIOMYOPATHY--- EF reportedly 20%. Off IVFs     4. HTN WITH CKD---- No ACE/ARB/DRI/Diuretics at this time. Agree with addition of Hydralazine for afterload reduction. BP better. Okay to add Entresto next week     5. PUD PROPHYLAXIS--- Started renal dose pepcid.     6. DVT PROPHYLAXIS--- SCDs    7. HYPOMAGNESEMIA------Replaced      Heide Reno MD  [facility]  [phone]  09/18/19  7:04 AM

## 2019-09-18 NOTE — TELEPHONE ENCOUNTER
Spoke with wife and explained that medications can be transferred to another pharmacy she would just need to call the pharmacy and request this.

## 2019-09-18 NOTE — PLAN OF CARE
Problem: Patient Care Overview  Goal: Plan of Care Review  Outcome: Outcome(s) achieved Date Met: 09/18/19 09/18/19 3486   Coping/Psychosocial   Plan of Care Reviewed With patient;spouse   OTHER   Outcome Summary Discharging to home no other services now     Goal: Individualization and Mutuality  Outcome: Outcome(s) achieved Date Met: 09/18/19    Goal: Discharge Needs Assessment  Outcome: Outcome(s) achieved Date Met: 09/18/19      Problem: Cardiac: Heart Failure (Adult)  Goal: Signs and Symptoms of Listed Potential Problems Will be Absent, Minimized or Managed (Cardiac: Heart Failure)  Outcome: Outcome(s) achieved Date Met: 09/18/19

## 2019-09-18 NOTE — TELEPHONE ENCOUNTER
"    Reason for Disposition  • Health Information question, no triage required and triager able to answer question    Additional Information  • Negative: [1] Caller is not with the adult (patient) AND [2] reporting urgent symptoms  • Negative: Lab result questions  • Negative: Medication questions  • Negative: Caller cannot be reached by phone  • Negative: Caller has already spoken to PCP or another triager  • Negative: RN needs further essential information from caller in order to complete triage  • Negative: Requesting regular office appointment  • Negative: [1] Caller requesting NON-URGENT health information AND [2] PCP's office is the best resource    Answer Assessment - Initial Assessment Questions  1. REASON FOR CALL or QUESTION: \"What is your reason for calling today?\" or \"How can I best help you?\" or \"What question do you have that I can help answer?\"      They have gotten home and looked at some information. They are wanting his scripts sent to a different pharmacy. Email sent to [facility] staff for follow up.    Protocols used: INFORMATION ONLY CALL-ADULT-      "

## 2019-09-19 ENCOUNTER — READMISSION MANAGEMENT (OUTPATIENT)
Dept: CALL CENTER | Facility: HOSPITAL | Age: 64
End: 2019-09-19

## 2019-09-19 NOTE — OUTREACH NOTE
Prep Survey      Responses   Facility patient discharged from?  [facility]   Is patient eligible?  Yes   Discharge diagnosis  Orthpnea, HTN, dilated cardiomyopathy, CHF, CAD, sp heart cath w/ stent placement   Does the patient have one of the following disease processes/diagnoses(primary or secondary)?  CHF   Does the patient have Home health ordered?  No   Is there a DME ordered?  No   Prep survey completed?  Yes          Aye Jeffrey RN

## 2019-09-20 ENCOUNTER — READMISSION MANAGEMENT (OUTPATIENT)
Dept: CALL CENTER | Facility: HOSPITAL | Age: 64
End: 2019-09-20

## 2019-09-20 ENCOUNTER — TELEPHONE (OUTPATIENT)
Dept: CARDIAC REHAB | Facility: HOSPITAL | Age: 64
End: 2019-09-20

## 2019-09-20 NOTE — OUTREACH NOTE
CHF Week 1 Survey      Responses   Facility patient discharged from?  [facility]   Does the patient have one of the following disease processes/diagnoses(primary or secondary)?  CHF   Is there a successful TCM telephone encounter documented?  No   CHF Week 1 attempt successful?  Yes   Call start time  0916   Call end time  0921   Discharge diagnosis  Orthpnea, HTN, dilated cardiomyopathy, CHF, CAD, sp heart cath w/ stent placement   Meds reviewed with patient/caregiver?  Yes   Is the patient having any side effects they believe may be caused by any medication additions or changes?  No   Does the patient have all medications ordered at discharge?  Yes   Is the patient taking all medications as directed (includes completed medication regime)?  Yes   Does the patient have a primary care provider?   Yes   Does the patient have an appointment with their PCP within 7 days of discharge?  Yes   Has the patient kept scheduled appointments due by today?  N/A   Has home health visited the patient within 72 hours of discharge?  N/A   Did the patient receive a copy of their discharge instructions?  Yes   Nursing interventions  Reviewed instructions with patient   What is the patient's perception of their health status since discharge?  Improving   Nursing interventions  Nurse provided patient education   Is the patient weighing daily?  No   Does the patient have scales?  Yes   Daily weight interventions  Education provided on importance of daily weight [PATIENT STATES HE WILL BEGIN WEIGHING HIMSELF DAILY]   Is the patient able to teach back Heart Failure diet management?  Yes   Is the patient able to teach back Heart Failure Zones?  Yes   Is the patient able to teach back signs and symptoms of worsening condition? (i.e. weight gain, shortness of air, etc.)  Yes   Is the patient/caregiver able to teach back the hierarchy of who to call/visit for symptoms/problems? PCP, Specialist, Home health nurse, Urgent Care, ED, 911  Yes    CHF  Week 1 call completed?  Yes          Albertina Mora LPN

## 2019-09-20 NOTE — TELEPHONE ENCOUNTER
Called pt to follow up and see if interested in Cardiac Rehab program. No answer. Voicemail left.

## 2019-09-23 ENCOUNTER — TELEPHONE (OUTPATIENT)
Dept: CARDIAC REHAB | Facility: HOSPITAL | Age: 64
End: 2019-09-23

## 2019-09-25 ENCOUNTER — TELEPHONE (OUTPATIENT)
Dept: CARDIAC REHAB | Facility: HOSPITAL | Age: 64
End: 2019-09-25

## 2019-09-25 ENCOUNTER — OFFICE VISIT (OUTPATIENT)
Dept: FAMILY MEDICINE CLINIC | Facility: CLINIC | Age: 64
End: 2019-09-25

## 2019-09-25 VITALS
TEMPERATURE: 98.2 F | DIASTOLIC BLOOD PRESSURE: 90 MMHG | RESPIRATION RATE: 8 BRPM | SYSTOLIC BLOOD PRESSURE: 158 MMHG | WEIGHT: 193 LBS | BODY MASS INDEX: 27.69 KG/M2 | HEART RATE: 56 BPM

## 2019-09-25 DIAGNOSIS — I50.21 ACUTE SYSTOLIC CONGESTIVE HEART FAILURE (HCC): ICD-10-CM

## 2019-09-25 DIAGNOSIS — I10 ESSENTIAL HYPERTENSION: ICD-10-CM

## 2019-09-25 DIAGNOSIS — I25.119 CORONARY ARTERY DISEASE INVOLVING NATIVE CORONARY ARTERY OF NATIVE HEART WITH ANGINA PECTORIS (HCC): Primary | ICD-10-CM

## 2019-09-25 DIAGNOSIS — I42.0 DILATED CARDIOMYOPATHY (HCC): ICD-10-CM

## 2019-09-25 PROCEDURE — 99213 OFFICE O/P EST LOW 20 MIN: CPT | Performed by: FAMILY MEDICINE

## 2019-09-25 PROCEDURE — 93010 ELECTROCARDIOGRAM REPORT: CPT | Performed by: INTERNAL MEDICINE

## 2019-09-25 RX ORDER — HYDRALAZINE HYDROCHLORIDE 50 MG/1
TABLET, FILM COATED ORAL
Qty: 135 TABLET | Refills: 11 | Status: SHIPPED | OUTPATIENT
Start: 2019-09-25 | End: 2019-11-25

## 2019-09-25 NOTE — ASSESSMENT & PLAN NOTE
Congestive heart failure due to hypertension.  Heart failure is improving with treatment.  NYHA Class IV.  Continue current treatment regimen.  Dietary sodium restriction.  Heart failure will be reassessed in 3 months.

## 2019-09-25 NOTE — ASSESSMENT & PLAN NOTE
Congestive heart failure due to coronary artery disease (CAD).  Heart failure is improving with treatment.  NYHA Class IV.  Continue current medications.  Heart failure will be reassessed in 3 months.  Increase hydralazine to 75mg tid

## 2019-09-25 NOTE — TELEPHONE ENCOUNTER
Patient interested in Cardiac Rehab. Order faxed to Dr. Kowalski, and Insurance form placed in Kinza's folder. No complaints at this time.

## 2019-09-25 NOTE — ASSESSMENT & PLAN NOTE
Coronary artery disease is improving with treatment.  Continue current treatment regimen.  Dietary sodium restriction.  Medication changes per orders.  Cardiac status will be reassessed in 3 months.

## 2019-09-25 NOTE — PROGRESS NOTES
Rooming Tab(CC,VS,Pt Hx,Fall Screen)  Chief Complaint   Patient presents with   • Follow-up     4 week       Subjective Patient is here for follow-up of his heart cath and stent placement.  He had stents placed in his RCA and LAD.  He also had noted to have congestive heart failure systolic dysfunction with a cardiomyopathy with a ejection fraction of 20%.  He also had moderate aortic regurgitation and left atrial enlargement.  Patient is currently feeling much better.  He has no chest pain with exertion.  He has no ARRIETA.  He has no orthopnea or PND.  He has no edema in his legs.  He has not been cleared for cardiac rehab yet.  Overall he is feeling much better and he is wearing a vest defibrillator to his severely poor ejection fraction.  He had a renal ultrasound was negative and saw Dr. Reno because of his renal sufficiency.    I have reviewed and updated his medications, medical history and problem list during today's office visit.     Patient Care Team:  Zev Li MD as PCP - General (Family Medicine)    Problem List Tab  Medications Tab  Synopsis Tab  Chart Review Tab  Care Everywhere Tab  Immunizations Tab  Patient History Tab    Social History     Tobacco Use   • Smoking status: Never Smoker   • Smokeless tobacco: Never Used   Substance Use Topics   • Alcohol use: No     Frequency: Never       Review of Systems   Constitutional: Negative for chills, fatigue and fever.   HENT: Negative for nosebleeds.    Eyes: Negative for double vision.   Respiratory: Negative for chest tightness and shortness of breath.    Cardiovascular: Negative for chest pain and palpitations.   Gastrointestinal: Negative for blood in stool.   Genitourinary: Negative for dysuria and hematuria.   Neurological: Negative for dizziness and syncope.   Psychiatric/Behavioral: Negative for depressed mood.       Objective     Rooming Tab(CC,VS,Pt Hx,Fall Screen)  /90 (BP Location: Right arm, Patient Position: Sitting, Cuff Size:  Large Adult)   Pulse 56   Temp 98.2 °F (36.8 °C) (Oral)   Resp 8   Wt 87.5 kg (193 lb)   BMI 27.69 kg/m²     Body mass index is 27.69 kg/m².    Physical Exam   Constitutional: He is oriented to person, place, and time. He appears well-developed and well-nourished. No distress.   HENT:   Head: Normocephalic and atraumatic.   Nose: Nose normal.   Mouth/Throat: Oropharynx is clear and moist.   Eyes: Conjunctivae, EOM and lids are normal. Pupils are equal, round, and reactive to light.   Neck: Trachea normal and normal range of motion. Neck supple. No JVD present. Carotid bruit is not present. No thyroid mass and no thyromegaly present.   No carotid bruits   Cardiovascular: Normal rate, regular rhythm, normal heart sounds and intact distal pulses.   Pulmonary/Chest: Effort normal and breath sounds normal.   Musculoskeletal:   No c/c/e   Neurological: He is alert and oriented to person, place, and time. No cranial nerve deficit.   Skin: Skin is warm and dry.   Psychiatric: He has a normal mood and affect. His speech is normal and behavior is normal. He is attentive.   Nursing note and vitals reviewed.       Statin Choice Calculator  Data Reviewed:    Us Renal Bilateral    Result Date: 9/13/2019  Impression:  1. Normal appearance of kidneys. 2. Mild prostatic enlargement.  Electronically Signed By-Saniya Dejesus On:9/13/2019 9:01 PM This report was finalized on [id number] by  Saniya Dejesus, .            Lab Results   Component Value Date    BUN 12 09/18/2019    CREATININE 1.00 09/18/2019    EGFRIFNONA 76 09/18/2019     09/18/2019    K 4.0 09/18/2019     09/18/2019    CALCIUM 8.5 (L) 09/18/2019    ALBUMIN 3.20 (L) 09/18/2019    BILITOT 1.0 09/17/2019    ALKPHOS 49 09/17/2019    AST 22 09/17/2019    ALT 18 09/17/2019    TRIG 69 09/17/2019    HDL 35 (L) 09/17/2019    VLDL 13.8 09/17/2019    LDL 92 09/17/2019    LDLHDL 2.46 09/17/2019    CKTOTAL 29 (L) 09/14/2019    WBC 7.50 09/18/2019    RBC 4.29 09/18/2019     HCT 38.7 09/18/2019    MCV 90.2 09/18/2019    MCH 30.3 09/18/2019    TSH 0.670 09/14/2019    PSA 0.520 08/15/2019    INR 1.02 09/17/2019    URICACID 7.9 09/13/2019      Assessment/Plan   Order Review Tab  Health Maintenance Tab  Patient Plan/Order Tab  Diagnoses and all orders for this visit:    1. Coronary artery disease involving native coronary artery of native heart with angina pectoris (CMS/HCC) (Primary)  Assessment & Plan:  Coronary artery disease is improving with treatment.  Continue current treatment regimen.  Dietary sodium restriction.  Medication changes per orders.  Cardiac status will be reassessed in 3 months.      2. Acute systolic congestive heart failure (CMS/[facility])  Assessment & Plan:  Congestive heart failure due to hypertension.  Heart failure is improving with treatment.  NYHA Class IV.  Continue current treatment regimen.  Dietary sodium restriction.  Heart failure will be reassessed in 3 months.      3. Dilated cardiomyopathy (CMS/[facility])  Assessment & Plan:  Congestive heart failure due to coronary artery disease (CAD).  Heart failure is improving with treatment.  NYHA Class IV.  Continue current medications.  Heart failure will be reassessed in 3 months.  Increase hydralazine to 75mg tid       4. Essential hypertension  Assessment & Plan:  Hypertension is improving with treatment.  Dietary sodium restriction.  Medication changes per orders.  Blood pressure will be reassessed at the next regular appointment.  Increase hydralazine to 75 mg 3 times daily      Other orders  -     hydrALAZINE (APRESOLINE) 50 MG tablet; 1.5 po tid  Dispense: 135 tablet; Refill: 11      Wrapup Tab  Return in about 3 months (around 12/25/2019) for Recheck.

## 2019-09-26 NOTE — ASSESSMENT & PLAN NOTE
Hypertension is improving with treatment.  Dietary sodium restriction.  Medication changes per orders.  Blood pressure will be reassessed at the next regular appointment.  Increase hydralazine to 75 mg 3 times daily

## 2019-09-27 ENCOUNTER — READMISSION MANAGEMENT (OUTPATIENT)
Dept: CALL CENTER | Facility: HOSPITAL | Age: 64
End: 2019-09-27

## 2019-09-27 NOTE — OUTREACH NOTE
CHF Week 2 Survey      Responses   Facility patient discharged from?  [facility]   Does the patient have one of the following disease processes/diagnoses(primary or secondary)?  CHF   Week 2 attempt successful?  No   Unsuccessful attempts  Attempt 1 [NO ANSWER, LEFT VM]          Albertina Mora LPN

## 2019-09-30 ENCOUNTER — TELEPHONE (OUTPATIENT)
Dept: CARDIAC REHAB | Facility: HOSPITAL | Age: 64
End: 2019-09-30

## 2019-09-30 ENCOUNTER — READMISSION MANAGEMENT (OUTPATIENT)
Dept: CALL CENTER | Facility: HOSPITAL | Age: 64
End: 2019-09-30

## 2019-09-30 NOTE — TELEPHONE ENCOUNTER
Called to notify pt of his insurance verification and talk to him about options for CR. No answer, voicemail left.

## 2019-09-30 NOTE — OUTREACH NOTE
CHF Week 2 Survey      Responses   Facility patient discharged from?  [facility]   Does the patient have one of the following disease processes/diagnoses(primary or secondary)?  CHF   Week 2 attempt successful?  No   Unsuccessful attempts  Attempt 2          Evelyn Gentile RN

## 2019-10-02 ENCOUNTER — READMISSION MANAGEMENT (OUTPATIENT)
Dept: CALL CENTER | Facility: HOSPITAL | Age: 64
End: 2019-10-02

## 2019-10-02 NOTE — OUTREACH NOTE
CHF Week 3 Survey      Responses   Facility patient discharged from?  [facility]   Does the patient have one of the following disease processes/diagnoses(primary or secondary)?  CHF   Week 3 attempt successful?  No   Unsuccessful attempts  Attempt 1   Revoke  Decline to participate [No answer/left voicemail]          Love Mallory LPN

## 2019-10-10 ENCOUNTER — OFFICE VISIT (OUTPATIENT)
Dept: CARDIOLOGY | Facility: CLINIC | Age: 64
End: 2019-10-10

## 2019-10-10 VITALS
WEIGHT: 193 LBS | BODY MASS INDEX: 27.63 KG/M2 | RESPIRATION RATE: 18 BRPM | HEART RATE: 59 BPM | SYSTOLIC BLOOD PRESSURE: 159 MMHG | HEIGHT: 70 IN | DIASTOLIC BLOOD PRESSURE: 69 MMHG | OXYGEN SATURATION: 98 %

## 2019-10-10 DIAGNOSIS — E78.2 MIXED HYPERLIPIDEMIA: ICD-10-CM

## 2019-10-10 DIAGNOSIS — I42.0 CARDIOMYOPATHY, DILATED (HCC): Primary | ICD-10-CM

## 2019-10-10 DIAGNOSIS — I25.119 CORONARY ARTERY DISEASE INVOLVING NATIVE CORONARY ARTERY OF NATIVE HEART WITH ANGINA PECTORIS (HCC): ICD-10-CM

## 2019-10-10 DIAGNOSIS — I10 ESSENTIAL HYPERTENSION: ICD-10-CM

## 2019-10-10 PROCEDURE — 93000 ELECTROCARDIOGRAM COMPLETE: CPT | Performed by: INTERNAL MEDICINE

## 2019-10-10 PROCEDURE — 99214 OFFICE O/P EST MOD 30 MIN: CPT | Performed by: INTERNAL MEDICINE

## 2019-10-10 NOTE — PROGRESS NOTES
Cardiology Office Visit      Encounter Date:  10/10/2019    Patient ID:   Devaughn Valdez is a 64 y.o. male.    Reason For Followup:  Coronary artery disease  Severe cardiomyopathy    Brief Clinical History:  Dear Zev Polk MD    I had the pleasure of seeing Devaughn Valdez today. As you are well aware, this is a 64 y.o. male that was recently diagnosed with hypertension and also cardiomegaly presented with symptoms of orthopnea and PND    EKG with left bundle branch block  Patient has severe cardiomyopathy on echocardiogram with LV ejection fraction of 20%  Moderate to severe mitral regurgitation  Continue wearable defibrillator    PROCEDURE PERFORMED: September 2019  Successful PCI mid LAD with placement of a drug-eluting stent  Successful PCI of the mid right coronary artery with placement of a drug-eluting stent  Successful insertion of the Impella left ventricular percutaneous ventricular assist device  Successful removal of the Impella left ventricular percutaneous ventricular assist device  Hemostasis of the left common femoral artery with a Perclose closure device             Interval History:  Patient underwent a cardiac catheterization that showed a severe two-vessel coronary artery disease that was successfully treated with placement of a drug-eluting stent    Assessment & Plan    Impressions:  Coronary artery disease  Severe cardiomyopathy  Hypertension  Lipidemia  Moderate mitral regurgitation  Left bundle branch block    Recommendations:  Repeat echocardiogram to assess the LV systolic function in 2 weeks might benefit from ICD if the EF is still low/if the LV ejection fraction is less than 35  Continue dual antiplatelet therapy with aspirin and Plavix  Start patient on Entresto and increase the dose as tolerated  Wean off and discontinue the hydralazine as the Entresto is escalated  Continue medical management for cardiomyopathy  Check renal function and BMP in 1 week  Follow-up in office in 1  "month  Objective:    Vitals:  Vitals:    10/10/19 1413   BP: 159/69   BP Location: Left arm   Pulse: 59   Resp: 18   SpO2: 98%   Weight: 87.5 kg (193 lb)   Height: 177.8 cm (70\")       Physical Exam:    General: Alert, cooperative, no distress, appears stated age  Head:  Normocephalic, atraumatic, mucous membranes moist  Eyes:  Conjunctiva/corneas clear, EOM's intact     Neck:  Supple,  no adenopathy;      Lungs: Clear to auscultation bilaterally, no wheezes rhonchi rales are noted  Chest wall: No tenderness  Heart::  Regular rate and rhythm, S1 and S2 normal, displaced LV apical impulse 2 x 6 pansystolic murmur left sternal border with radiation to left axilla  Abdomen: Soft, non-tender, nondistended bowel sounds active  Extremities: No cyanosis, clubbing, or edema  Pulses: 2+ and symmetric all extremities  Skin:  No rashes or lesions  Neuro/psych: A&O x3. CN II through XII are grossly intact with appropriate affect      Allergies:  No Known Allergies    Medication Review:     Current Outpatient Medications:   •  aspirin 81 MG EC tablet, Take 1 tablet by mouth Daily., Disp: 30 tablet, Rfl: 5  •  atorvastatin (LIPITOR) 40 MG tablet, Take 1 tablet by mouth Every Night., Disp: 30 tablet, Rfl: 5  •  clopidogrel (PLAVIX) 75 MG tablet, Take 1 tablet by mouth Daily., Disp: 30 tablet, Rfl: 6  •  hydrALAZINE (APRESOLINE) 50 MG tablet, 1.5 po tid, Disp: 135 tablet, Rfl: 11  •  isosorbide mononitrate (IMDUR) 60 MG 24 hr tablet, Take 1 tablet by mouth Daily., Disp: 30 tablet, Rfl: 2  •  metoprolol tartrate (LOPRESSOR) 25 MG tablet, Take 1 tablet by mouth 2 (Two) Times a Day., Disp: 60 tablet, Rfl: 2  •  spironolactone (ALDACTONE) 25 MG tablet, Take 0.5 tablets by mouth Daily., Disp: 30 tablet, Rfl: 2    Family History:  Family History   Problem Relation Age of Onset   • Cancer Mother    • Heart attack Father        Past Medical History:  Past Medical History:   Diagnosis Date   • Aortic insufficiency     Moderate   • CAD " (coronary artery disease)    • Cardiomyopathy (CMS/HCC)     e.f 20%   • Hyperlipidemia    • Hypertension    • Left atrial enlargement        Past surgical History:  Past Surgical History:   Procedure Laterality Date   • CARDIAC CATHETERIZATION N/A 9/13/2019    Procedure: Left Heart Cath and right heart cath;  Surgeon: Griselda Kowalski MD;  Location:  [facility];  Service: Cardiovascular   • CARDIAC CATHETERIZATION N/A 9/16/2019    Procedure: PCI to LAD;  Surgeon: Griselda Kowalski MD;  Location:  [facility];  Service: Cardiovascular   • CARDIAC ELECTROPHYSIOLOGY PROCEDURE  9/16/2019    Procedure: Impella Insertion;  Surgeon: Griselda Kowalski MD;  Location:  [facility];  Service: Cardiovascular   • CARDIAC ELECTROPHYSIOLOGY PROCEDURE N/A 9/16/2019    Procedure: Impella Removal;  Surgeon: Griselda Kowalski MD;  Location: [facility];  Service: Cardiovascular   • CORONARY ANGIOPLASTY WITH STENT PLACEMENT      RCA and LAD   • [place] RT/LT HEART CATHETERS N/A 9/16/2019    Procedure: Percutaneous Coronary Intervention;  Surgeon: Griselda Kowalski MD;  Location: [facility];  Service: Cardiovascular       Social History:  Social History     Socioeconomic History   • Marital status:      Spouse name: Not on file   • Number of children: Not on file   • Years of education: Not on file   • Highest education level: Not on file   Tobacco Use   • Smoking status: Never Smoker   • Smokeless tobacco: Never Used   Substance and Sexual Activity   • Alcohol use: No     Frequency: Never   • Drug use: No   • Sexual activity: Defer       Review of Systems:  The following systems were reviewed as they relate to the cardiovascular system: Constitutional, Eyes, ENT, Cardiovascular, Respiratory, Gastrointestinal, Integumentary, Neurological, Psychiatric, Hematologic, Endocrine, Musculoskeletal, and Genitourinary. The  pertinent cardiovascular findings are reported above with all other cardiovascular points within those systems being negative.    Diagnostic Study Review:     Current Electrocardiogram:    ECG 12 Lead  Date/Time: 10/10/2019 5:48 PM  Performed by: Griselda Kowalski MD  Authorized by: Girselda Kowalski MD   Comparison: compared with previous ECG   Similar to previous ECG  Rhythm: sinus rhythm  Rate: normal  Conduction: left bundle branch block  QRS axis: left    Clinical impression: abnormal EKG              NOTE: The following portions of the patient's history were reviewed and updated this visit as appropriate: allergies, current medications, past family history, past medical history, past social history, past surgical history and problem list.

## 2019-10-14 ENCOUNTER — TELEPHONE (OUTPATIENT)
Dept: CARDIAC REHAB | Facility: HOSPITAL | Age: 64
End: 2019-10-14

## 2019-10-14 NOTE — TELEPHONE ENCOUNTER
Pt called in to return call from last week. Told him his insurance info and says he will call his ins. Company himself to see if closer to meeting his OOP yet and then he will call us back.

## 2019-10-30 ENCOUNTER — HOSPITAL ENCOUNTER (OUTPATIENT)
Dept: CARDIOLOGY | Facility: HOSPITAL | Age: 64
Discharge: HOME | End: 2019-10-30
Admitting: INTERNAL MEDICINE

## 2019-10-30 VITALS
DIASTOLIC BLOOD PRESSURE: 84 MMHG | BODY MASS INDEX: 26.92 KG/M2 | HEART RATE: 58 BPM | WEIGHT: 188 LBS | HEIGHT: 70 IN | SYSTOLIC BLOOD PRESSURE: 156 MMHG

## 2019-10-30 DIAGNOSIS — I42.0 CARDIOMYOPATHY, DILATED (HCC): ICD-10-CM

## 2019-10-30 PROCEDURE — 0399T HC MYOCARDL STRAIN IMAG QUAN ASSMT PER SESS: CPT

## 2019-10-30 PROCEDURE — 93308 TTE F-UP OR LMTD: CPT

## 2019-10-31 LAB
BH CV ECHO MEAS - BSA(HAYCOCK): 2.1 M^2
BH CV ECHO MEAS - BSA: 2 M^2
BH CV ECHO MEAS - BZI_BMI: 27 KILOGRAMS/M^2
BH CV ECHO MEAS - BZI_METRIC_HEIGHT: 177.8 CM
BH CV ECHO MEAS - BZI_METRIC_WEIGHT: 85.3 KG
BH CV ECHO MEAS - EDV(CUBED): 201.5 ML
BH CV ECHO MEAS - EDV(MOD-SP2): 208.8 ML
BH CV ECHO MEAS - EDV(MOD-SP4): 256.1 ML
BH CV ECHO MEAS - EDV(TEICH): 170.7 ML
BH CV ECHO MEAS - EF(CUBED): 35.2 %
BH CV ECHO MEAS - EF(MOD-BP): 33 %
BH CV ECHO MEAS - EF(MOD-SP2): 30.5 %
BH CV ECHO MEAS - EF(MOD-SP4): 32.9 %
BH CV ECHO MEAS - EF(TEICH): 28.4 %
BH CV ECHO MEAS - ESV(CUBED): 130.5 ML
BH CV ECHO MEAS - ESV(MOD-SP2): 145.2 ML
BH CV ECHO MEAS - ESV(MOD-SP4): 171.9 ML
BH CV ECHO MEAS - ESV(TEICH): 122.2 ML
BH CV ECHO MEAS - FS: 13.5 %
BH CV ECHO MEAS - IVS/LVPW: 1.4
BH CV ECHO MEAS - IVSD: 1.6 CM
BH CV ECHO MEAS - LV DIASTOLIC VOL/BSA (35-75): 126 ML/M^2
BH CV ECHO MEAS - LV MASS(C)D: 375.9 GRAMS
BH CV ECHO MEAS - LV MASS(C)DI: 184.9 GRAMS/M^2
BH CV ECHO MEAS - LV SYSTOLIC VOL/BSA (12-30): 84.6 ML/M^2
BH CV ECHO MEAS - LVIDD: 5.9 CM
BH CV ECHO MEAS - LVIDS: 5.1 CM
BH CV ECHO MEAS - LVPWD: 1.2 CM
BH CV ECHO MEAS - SI(CUBED): 34.9 ML/M^2
BH CV ECHO MEAS - SI(MOD-SP2): 31.3 ML/M^2
BH CV ECHO MEAS - SI(MOD-SP4): 41.4 ML/M^2
BH CV ECHO MEAS - SI(TEICH): 23.8 ML/M^2
BH CV ECHO MEAS - SV(CUBED): 71 ML
BH CV ECHO MEAS - SV(MOD-SP2): 63.6 ML
BH CV ECHO MEAS - SV(MOD-SP4): 84.2 ML
BH CV ECHO MEAS - SV(TEICH): 48.4 ML
LV EF 2D ECHO EST: 35 %
MAXIMAL PREDICTED HEART RATE: 158 BPM
STRESS TARGET HR: 134 BPM

## 2019-10-31 PROCEDURE — 0399T ADULT TRANSTHORACIC ECHO LIMITED W/ CONT IF NECESSARY PER PROTOCOL: CPT | Performed by: INTERNAL MEDICINE

## 2019-10-31 PROCEDURE — 93308 TTE F-UP OR LMTD: CPT | Performed by: INTERNAL MEDICINE

## 2019-11-22 RX ORDER — ISOSORBIDE MONONITRATE 60 MG/1
TABLET, EXTENDED RELEASE ORAL
Qty: 30 TABLET | Refills: 1 | Status: SHIPPED | OUTPATIENT
Start: 2019-11-22 | End: 2020-02-11

## 2019-11-25 ENCOUNTER — OFFICE VISIT (OUTPATIENT)
Dept: CARDIOLOGY | Facility: CLINIC | Age: 64
End: 2019-11-25

## 2019-11-25 VITALS
SYSTOLIC BLOOD PRESSURE: 158 MMHG | HEIGHT: 70 IN | HEART RATE: 71 BPM | BODY MASS INDEX: 26.92 KG/M2 | WEIGHT: 188 LBS | DIASTOLIC BLOOD PRESSURE: 90 MMHG | RESPIRATION RATE: 18 BRPM

## 2019-11-25 DIAGNOSIS — E78.2 MIXED HYPERLIPIDEMIA: ICD-10-CM

## 2019-11-25 DIAGNOSIS — I42.0 CARDIOMYOPATHY, DILATED (HCC): ICD-10-CM

## 2019-11-25 DIAGNOSIS — I25.118 CORONARY ARTERY DISEASE OF NATIVE HEART WITH STABLE ANGINA PECTORIS, UNSPECIFIED VESSEL OR LESION TYPE (HCC): Primary | ICD-10-CM

## 2019-11-25 DIAGNOSIS — I10 ESSENTIAL HYPERTENSION: ICD-10-CM

## 2019-11-25 PROCEDURE — 99214 OFFICE O/P EST MOD 30 MIN: CPT | Performed by: INTERNAL MEDICINE

## 2019-11-25 NOTE — PROGRESS NOTES
"Cardiology Office Visit      Encounter Date:  11/25/2019    Patient ID:   Devaughn Valdez is a 64 y.o. male.    Reason For Followup:  Cardiomyopathy  Coronary artery disease status post PCI and stenting  Hypertension  Hyperlipidemia    Brief Clinical History:  Dear Zev Polk MD    I had the pleasure of seeing Devaughn Valdez today. As you are well aware, this is a 64 y.o. male  that was recently diagnosed with hypertension and also cardiomegaly and severe cardiomyopathy    Patient has severe cardiomyopathy on echocardiogram with LV ejection fraction of 20%  Moderate to severe mitral regurgitation    PROCEDURE PERFORMED: September 2019  Successful PCI mid LAD with placement of a drug-eluting stent  Successful PCI of the mid right coronary artery with placement of a drug-eluting stent    Interpretation Summary     · The left ventricular cavity is mild-to-moderately dilated.  · Left ventricular wall thickness is consistent with mild concentric hypertrophy.  · Estimated EF = 35%.           Interval History:  Patient denies any symptoms of chest pain shortness of breath dizziness or syncope    Assessment & Plan    Impressions:  Severe cardiomyopathy with improvement of the LV systolic function and ejection fraction from 20% to 35%  Coronary artery disease status post PCI and stenting  Hypertension  Hyperlipidemia    Recommendations:  Okay to discontinue use of LifeVest device  Increase the Entresto dose  Continue dual antiplatelet therapy with aspirin Plavix  Check labs next month  Continue aspirin statin Plavix beta-blocker for coronary artery disease  Renew Entresto and spironolactone for cardiomyopathy  Repeat echocardiogram in 3 months  Continue close monitoring    Objective:    Vitals:  Vitals:    11/25/19 1511   BP: 158/90   BP Location: Left arm   Pulse: 71   Resp: 18   Weight: 85.3 kg (188 lb)   Height: 177.8 cm (70\")       Physical Exam:    General: Alert, cooperative, no distress, appears stated " age  Head:  Normocephalic, atraumatic, mucous membranes moist  Eyes:  Conjunctiva/corneas clear, EOM's intact     Neck:  Supple,  no adenopathy;      Lungs: Clear to auscultation bilaterally, no wheezes rhonchi rales are noted  Chest wall: No tenderness  Heart::  Regular rate and rhythm, S1 and S2 normal, displaced LV apical impulse 2 x 6 ejection systolic murmur  Abdomen: Soft, non-tender, nondistended bowel sounds active  Extremities: No cyanosis, clubbing, or edema  Pulses: 2+ and symmetric all extremities  Skin:  No rashes or lesions  Neuro/psych: A&O x3. CN II through XII are grossly intact with appropriate affect      Allergies:  No Known Allergies    Medication Review:     Current Outpatient Medications:   •  aspirin 81 MG EC tablet, Take 1 tablet by mouth Daily., Disp: 30 tablet, Rfl: 5  •  atorvastatin (LIPITOR) 40 MG tablet, Take 1 tablet by mouth Every Night., Disp: 30 tablet, Rfl: 5  •  clopidogrel (PLAVIX) 75 MG tablet, Take 1 tablet by mouth Daily., Disp: 30 tablet, Rfl: 6  •  isosorbide mononitrate (IMDUR) 60 MG 24 hr tablet, TAKE 1 TABLET BY MOUTH ONE TIME A DAY , Disp: 30 tablet, Rfl: 1  •  metoprolol tartrate (LOPRESSOR) 25 MG tablet, Take 1 tablet by mouth 2 (Two) Times a Day., Disp: 60 tablet, Rfl: 2  •  sacubitril-valsartan (ENTRESTO) 24-26 MG tablet, Take 1 tablet by mouth 2 (Two) Times a Day., Disp: 60 tablet, Rfl: 2  •  spironolactone (ALDACTONE) 25 MG tablet, Take 0.5 tablets by mouth Daily., Disp: 30 tablet, Rfl: 2    Family History:  Family History   Problem Relation Age of Onset   • Cancer Mother    • Heart attack Father        Past Medical History:  Past Medical History:   Diagnosis Date   • Aortic insufficiency     Moderate   • CAD (coronary artery disease)    • Cardiomyopathy (CMS/HCC)     e.f 20%   • Hyperlipidemia    • Hypertension    • Left atrial enlargement        Past surgical History:  Past Surgical History:   Procedure Laterality Date   • CARDIAC CATHETERIZATION N/A 9/13/2019     Procedure: Left Heart Cath and right heart cath;  Surgeon: Griselda Kowalski MD;  Location: [facility] CATH INVASIVE LOCATION;  Service: Cardiovascular   • CARDIAC CATHETERIZATION N/A 9/16/2019    Procedure: PCI to LAD;  Surgeon: Griselda Kowalski MD;  Location: [facility] CATH INVASIVE LOCATION;  Service: Cardiovascular   • CARDIAC ELECTROPHYSIOLOGY PROCEDURE  9/16/2019    Procedure: Impella Insertion;  Surgeon: Griselda Kowalski MD;  Location: [facility] CATH INVASIVE LOCATION;  Service: Cardiovascular   • CARDIAC ELECTROPHYSIOLOGY PROCEDURE N/A 9/16/2019    Procedure: Impella Removal;  Surgeon: Griselda Kowalski MD;  Location: [facility] CATH INVASIVE LOCATION;  Service: Cardiovascular   • CORONARY ANGIOPLASTY WITH STENT PLACEMENT      RCA and LAD   • [place] RT/LT HEART CATHETERS N/A 9/16/2019    Procedure: Percutaneous Coronary Intervention;  Surgeon: Griselda Kowalski MD;  Location: [facility] CATH INVASIVE LOCATION;  Service: Cardiovascular       Social History:  Social History     Socioeconomic History   • Marital status:      Spouse name: Not on file   • Number of children: Not on file   • Years of education: Not on file   • Highest education level: Not on file   Tobacco Use   • Smoking status: Never Smoker   • Smokeless tobacco: Never Used   Substance and Sexual Activity   • Alcohol use: No     Frequency: Never   • Drug use: No   • Sexual activity: Defer       Review of Systems:  The following systems were reviewed as they relate to the cardiovascular system: Constitutional, Eyes, ENT, Cardiovascular, Respiratory, Gastrointestinal, Integumentary, Neurological, Psychiatric, Hematologic, Endocrine, Musculoskeletal, and Genitourinary. The pertinent cardiovascular findings are reported above with all other cardiovascular points within those systems being negative.    Diagnostic Study Review:     Current Electrocardiogram:  Procedures      NOTE: The following portions of the patient's history  were reviewed and updated this visit as appropriate: allergies, current medications, past family history, past medical history, past social history, past surgical history and problem list.

## 2020-02-11 RX ORDER — ISOSORBIDE MONONITRATE 60 MG/1
TABLET, EXTENDED RELEASE ORAL
Qty: 30 TABLET | Refills: 0 | Status: SHIPPED | OUTPATIENT
Start: 2020-02-11 | End: 2020-03-17

## 2020-02-21 RX ORDER — ATORVASTATIN CALCIUM 40 MG/1
40 TABLET, FILM COATED ORAL NIGHTLY
Qty: 90 TABLET | Refills: 3 | Status: SHIPPED | OUTPATIENT
Start: 2020-02-21 | End: 2020-09-21 | Stop reason: SDUPTHER

## 2020-03-17 RX ORDER — ISOSORBIDE MONONITRATE 60 MG/1
TABLET, EXTENDED RELEASE ORAL
Qty: 30 TABLET | Refills: 0 | Status: SHIPPED | OUTPATIENT
Start: 2020-03-17 | End: 2020-04-09

## 2020-04-09 RX ORDER — ISOSORBIDE MONONITRATE 60 MG/1
TABLET, EXTENDED RELEASE ORAL
Qty: 30 TABLET | Refills: 0 | Status: SHIPPED | OUTPATIENT
Start: 2020-04-09 | End: 2020-05-18

## 2020-04-09 RX ORDER — CLOPIDOGREL BISULFATE 75 MG/1
TABLET ORAL
Qty: 30 TABLET | Refills: 0 | Status: SHIPPED | OUTPATIENT
Start: 2020-04-09 | End: 2020-05-18

## 2020-05-18 RX ORDER — CLOPIDOGREL BISULFATE 75 MG/1
TABLET ORAL
Qty: 30 TABLET | Refills: 0 | Status: SHIPPED | OUTPATIENT
Start: 2020-05-18 | End: 2020-06-22

## 2020-05-18 RX ORDER — ISOSORBIDE MONONITRATE 60 MG/1
TABLET, EXTENDED RELEASE ORAL
Qty: 30 TABLET | Refills: 0 | Status: SHIPPED | OUTPATIENT
Start: 2020-05-18 | End: 2020-06-22

## 2020-06-22 RX ORDER — ISOSORBIDE MONONITRATE 60 MG/1
TABLET, EXTENDED RELEASE ORAL
Qty: 30 TABLET | Refills: 0 | Status: SHIPPED | OUTPATIENT
Start: 2020-06-22 | End: 2020-07-24 | Stop reason: SDUPTHER

## 2020-06-22 RX ORDER — CLOPIDOGREL BISULFATE 75 MG/1
TABLET ORAL
Qty: 30 TABLET | Refills: 0 | Status: SHIPPED | OUTPATIENT
Start: 2020-06-22 | End: 2020-07-24 | Stop reason: SDUPTHER

## 2020-07-22 RX ORDER — CLOPIDOGREL BISULFATE 75 MG/1
TABLET ORAL
Qty: 30 TABLET | Refills: 0 | OUTPATIENT
Start: 2020-07-22

## 2020-07-22 RX ORDER — ISOSORBIDE MONONITRATE 60 MG/1
TABLET, EXTENDED RELEASE ORAL
Qty: 30 TABLET | Refills: 0 | OUTPATIENT
Start: 2020-07-22

## 2020-07-24 RX ORDER — ISOSORBIDE MONONITRATE 60 MG/1
60 TABLET, EXTENDED RELEASE ORAL DAILY
Qty: 30 TABLET | Refills: 0 | Status: SHIPPED | OUTPATIENT
Start: 2020-07-24 | End: 2020-08-19

## 2020-07-24 RX ORDER — CLOPIDOGREL BISULFATE 75 MG/1
75 TABLET ORAL DAILY
Qty: 30 TABLET | Refills: 0 | Status: SHIPPED | OUTPATIENT
Start: 2020-07-24 | End: 2020-08-19

## 2020-08-19 RX ORDER — SPIRONOLACTONE 25 MG/1
TABLET ORAL
Qty: 30 TABLET | Refills: 0 | Status: SHIPPED | OUTPATIENT
Start: 2020-08-19 | End: 2020-09-21 | Stop reason: SDUPTHER

## 2020-08-19 RX ORDER — CLOPIDOGREL BISULFATE 75 MG/1
TABLET ORAL
Qty: 30 TABLET | Refills: 0 | Status: SHIPPED | OUTPATIENT
Start: 2020-08-19 | End: 2020-09-21 | Stop reason: SDUPTHER

## 2020-08-19 RX ORDER — ISOSORBIDE MONONITRATE 60 MG/1
TABLET, EXTENDED RELEASE ORAL
Qty: 30 TABLET | Refills: 0 | Status: SHIPPED | OUTPATIENT
Start: 2020-08-19 | End: 2020-09-21 | Stop reason: SDUPTHER

## 2020-09-21 RX ORDER — ISOSORBIDE MONONITRATE 60 MG/1
60 TABLET, EXTENDED RELEASE ORAL DAILY
Qty: 30 TABLET | Refills: 0 | Status: SHIPPED | OUTPATIENT
Start: 2020-09-21 | End: 2020-11-11

## 2020-09-21 RX ORDER — CLOPIDOGREL BISULFATE 75 MG/1
75 TABLET ORAL DAILY
Qty: 30 TABLET | Refills: 0 | Status: SHIPPED | OUTPATIENT
Start: 2020-09-21 | End: 2020-10-30

## 2020-09-21 RX ORDER — ATORVASTATIN CALCIUM 40 MG/1
40 TABLET, FILM COATED ORAL NIGHTLY
Qty: 30 TABLET | Refills: 0 | Status: SHIPPED | OUTPATIENT
Start: 2020-09-21 | End: 2021-03-03

## 2020-09-21 RX ORDER — SPIRONOLACTONE 25 MG/1
12.5 TABLET ORAL DAILY
Qty: 30 TABLET | Refills: 0 | Status: SHIPPED | OUTPATIENT
Start: 2020-09-21 | End: 2020-10-30

## 2020-09-21 RX ORDER — ASPIRIN 81 MG/1
81 TABLET ORAL DAILY
Qty: 30 TABLET | Refills: 0 | Status: SHIPPED | OUTPATIENT
Start: 2020-09-21

## 2020-09-21 RX ORDER — SACUBITRIL AND VALSARTAN 49; 51 MG/1; MG/1
1 TABLET, FILM COATED ORAL 2 TIMES DAILY
Qty: 30 TABLET | Refills: 0 | Status: SHIPPED | OUTPATIENT
Start: 2020-09-21 | End: 2020-10-14

## 2020-10-14 RX ORDER — SACUBITRIL AND VALSARTAN 49; 51 MG/1; MG/1
TABLET, FILM COATED ORAL
Qty: 30 TABLET | Refills: 0 | Status: SHIPPED | OUTPATIENT
Start: 2020-10-14 | End: 2020-10-30

## 2020-10-29 ENCOUNTER — LAB (OUTPATIENT)
Dept: LAB | Facility: HOSPITAL | Age: 65
End: 2020-10-29

## 2020-10-29 DIAGNOSIS — I25.118 CORONARY ARTERY DISEASE OF NATIVE HEART WITH STABLE ANGINA PECTORIS, UNSPECIFIED VESSEL OR LESION TYPE (HCC): ICD-10-CM

## 2020-10-29 DIAGNOSIS — I42.0 CARDIOMYOPATHY, DILATED (HCC): ICD-10-CM

## 2020-10-29 LAB
ALBUMIN SERPL-MCNC: 4.4 G/DL (ref 3.5–5.2)
ALBUMIN/GLOB SERPL: 1.5 G/DL
ALP SERPL-CCNC: 60 U/L (ref 39–117)
ALT SERPL W P-5'-P-CCNC: 19 U/L (ref 1–41)
ANION GAP SERPL CALCULATED.3IONS-SCNC: 6.8 MMOL/L (ref 5–15)
AST SERPL-CCNC: 21 U/L (ref 1–40)
BASOPHILS # BLD AUTO: 0.05 10*3/MM3 (ref 0–0.2)
BASOPHILS NFR BLD AUTO: 0.7 % (ref 0–1.5)
BILIRUB SERPL-MCNC: 0.6 MG/DL (ref 0–1.2)
BUN SERPL-MCNC: 18 MG/DL (ref 8–23)
BUN/CREAT SERPL: 15.7 (ref 7–25)
CALCIUM SPEC-SCNC: 9.5 MG/DL (ref 8.6–10.5)
CHLORIDE SERPL-SCNC: 106 MMOL/L (ref 98–107)
CHOLEST SERPL-MCNC: 114 MG/DL (ref 0–200)
CO2 SERPL-SCNC: 29.2 MMOL/L (ref 22–29)
CREAT SERPL-MCNC: 1.15 MG/DL (ref 0.76–1.27)
DEPRECATED RDW RBC AUTO: 41.8 FL (ref 37–54)
EOSINOPHIL # BLD AUTO: 0.14 10*3/MM3 (ref 0–0.4)
EOSINOPHIL NFR BLD AUTO: 1.9 % (ref 0.3–6.2)
ERYTHROCYTE [DISTWIDTH] IN BLOOD BY AUTOMATED COUNT: 12.8 % (ref 12.3–15.4)
GFR SERPL CREATININE-BSD FRML MDRD: 64 ML/MIN/1.73
GLOBULIN UR ELPH-MCNC: 2.9 GM/DL
GLUCOSE SERPL-MCNC: 92 MG/DL (ref 65–99)
HCT VFR BLD AUTO: 42.1 % (ref 37.5–51)
HDLC SERPL-MCNC: 43 MG/DL (ref 40–60)
HGB BLD-MCNC: 14.2 G/DL (ref 13–17.7)
IMM GRANULOCYTES # BLD AUTO: 0.01 10*3/MM3 (ref 0–0.05)
IMM GRANULOCYTES NFR BLD AUTO: 0.1 % (ref 0–0.5)
LDLC SERPL CALC-MCNC: 54 MG/DL (ref 0–100)
LDLC/HDLC SERPL: 1.23 {RATIO}
LYMPHOCYTES # BLD AUTO: 2.53 10*3/MM3 (ref 0.7–3.1)
LYMPHOCYTES NFR BLD AUTO: 34.5 % (ref 19.6–45.3)
MCH RBC QN AUTO: 30 PG (ref 26.6–33)
MCHC RBC AUTO-ENTMCNC: 33.7 G/DL (ref 31.5–35.7)
MCV RBC AUTO: 88.8 FL (ref 79–97)
MONOCYTES # BLD AUTO: 0.67 10*3/MM3 (ref 0.1–0.9)
MONOCYTES NFR BLD AUTO: 9.1 % (ref 5–12)
NEUTROPHILS NFR BLD AUTO: 3.94 10*3/MM3 (ref 1.7–7)
NEUTROPHILS NFR BLD AUTO: 53.7 % (ref 42.7–76)
NRBC BLD AUTO-RTO: 0 /100 WBC (ref 0–0.2)
PLATELET # BLD AUTO: 128 10*3/MM3 (ref 140–450)
PMV BLD AUTO: 12.9 FL (ref 6–12)
POTASSIUM SERPL-SCNC: 4.6 MMOL/L (ref 3.5–5.2)
PROT SERPL-MCNC: 7.3 G/DL (ref 6–8.5)
RBC # BLD AUTO: 4.74 10*6/MM3 (ref 4.14–5.8)
SODIUM SERPL-SCNC: 142 MMOL/L (ref 136–145)
TRIGL SERPL-MCNC: 90 MG/DL (ref 0–150)
VLDLC SERPL-MCNC: 17 MG/DL (ref 5–40)
WBC # BLD AUTO: 7.34 10*3/MM3 (ref 3.4–10.8)

## 2020-10-29 PROCEDURE — 80053 COMPREHEN METABOLIC PANEL: CPT

## 2020-10-29 PROCEDURE — 85025 COMPLETE CBC W/AUTO DIFF WBC: CPT

## 2020-10-29 PROCEDURE — 80061 LIPID PANEL: CPT

## 2020-10-30 RX ORDER — CLOPIDOGREL BISULFATE 75 MG/1
TABLET ORAL
Qty: 30 TABLET | Refills: 0 | Status: SHIPPED | OUTPATIENT
Start: 2020-10-30 | End: 2020-11-04 | Stop reason: SDUPTHER

## 2020-10-30 RX ORDER — SACUBITRIL AND VALSARTAN 49; 51 MG/1; MG/1
TABLET, FILM COATED ORAL
Qty: 30 TABLET | Refills: 0 | Status: SHIPPED | OUTPATIENT
Start: 2020-10-30 | End: 2020-11-04

## 2020-10-30 RX ORDER — SPIRONOLACTONE 25 MG/1
TABLET ORAL
Qty: 30 TABLET | Refills: 0 | Status: SHIPPED | OUTPATIENT
Start: 2020-10-30 | End: 2020-11-04 | Stop reason: SDUPTHER

## 2020-11-04 ENCOUNTER — OFFICE VISIT (OUTPATIENT)
Dept: CARDIOLOGY | Facility: CLINIC | Age: 65
End: 2020-11-04

## 2020-11-04 VITALS
BODY MASS INDEX: 30.35 KG/M2 | OXYGEN SATURATION: 99 % | SYSTOLIC BLOOD PRESSURE: 180 MMHG | HEART RATE: 48 BPM | RESPIRATION RATE: 18 BRPM | HEIGHT: 70 IN | WEIGHT: 212 LBS | DIASTOLIC BLOOD PRESSURE: 97 MMHG

## 2020-11-04 DIAGNOSIS — I42.0 CARDIOMYOPATHY, DILATED (HCC): Primary | ICD-10-CM

## 2020-11-04 DIAGNOSIS — I25.119 CORONARY ARTERY DISEASE INVOLVING NATIVE CORONARY ARTERY OF NATIVE HEART WITH ANGINA PECTORIS (HCC): ICD-10-CM

## 2020-11-04 DIAGNOSIS — I10 ESSENTIAL HYPERTENSION: ICD-10-CM

## 2020-11-04 DIAGNOSIS — I35.1 NONRHEUMATIC AORTIC VALVE INSUFFICIENCY: ICD-10-CM

## 2020-11-04 DIAGNOSIS — I50.21 ACUTE SYSTOLIC CONGESTIVE HEART FAILURE (HCC): ICD-10-CM

## 2020-11-04 DIAGNOSIS — I34.0 NON-RHEUMATIC MITRAL REGURGITATION: ICD-10-CM

## 2020-11-04 DIAGNOSIS — I42.0 DILATED CARDIOMYOPATHY (HCC): ICD-10-CM

## 2020-11-04 PROCEDURE — 93000 ELECTROCARDIOGRAM COMPLETE: CPT | Performed by: INTERNAL MEDICINE

## 2020-11-04 PROCEDURE — 99214 OFFICE O/P EST MOD 30 MIN: CPT | Performed by: INTERNAL MEDICINE

## 2020-11-04 RX ORDER — SPIRONOLACTONE 25 MG/1
12.5 TABLET ORAL DAILY
Qty: 46 TABLET | Refills: 3 | Status: SHIPPED | OUTPATIENT
Start: 2020-11-04 | End: 2020-12-29

## 2020-11-04 RX ORDER — CLOPIDOGREL BISULFATE 75 MG/1
75 TABLET ORAL DAILY
Qty: 90 TABLET | Refills: 3 | Status: SHIPPED | OUTPATIENT
Start: 2020-11-04 | End: 2021-08-26 | Stop reason: SDUPTHER

## 2020-11-04 RX ORDER — SACUBITRIL AND VALSARTAN 49; 51 MG/1; MG/1
1 TABLET, FILM COATED ORAL 2 TIMES DAILY
Qty: 180 TABLET | Refills: 3 | Status: SHIPPED | OUTPATIENT
Start: 2020-11-04 | End: 2021-08-26 | Stop reason: SDUPTHER

## 2020-11-04 NOTE — PROGRESS NOTES
Cardiology Office Visit      Encounter Date:  11/04/2020    Patient ID:   Devaughn Valdez is a 65 y.o. male.    Reason For Followup:  Cardiomyopathy  Coronary artery disease status post PCI and stenting  Hypertension  Hyperlipidemia    Brief Clinical History:  Dear Zev Polk MD    I had the pleasure of seeing Devaughn Valdez today. As you are well aware, this is a 65 y.o. male  that was recently diagnosed with hypertension and also cardiomegaly and severe cardiomyopathy    Patient has severe cardiomyopathy on echocardiogram with LV ejection fraction of 20%  Moderate to severe mitral regurgitation    PROCEDURE PERFORMED: September 2019  Successful PCI mid LAD with placement of a drug-eluting stent  Successful PCI of the mid right coronary artery with placement of a drug-eluting stent    Interpretation Summary     · The left ventricular cavity is mild-to-moderately dilated.  · Left ventricular wall thickness is consistent with mild concentric hypertrophy.  · Estimated EF = 35%.           Interval History:  Patient denies any symptoms of chest pain shortness of breath dizziness or syncope  Denies any new complaints  Assessment & Plan    Impressions:  Severe cardiomyopathy with improvement of the LV systolic function and ejection fraction from 20% to 35%  Coronary artery disease status post PCI and stenting  Hypertension  Hyperlipidemia    Recommendations:  Increase the Entresto dose  Continue dual antiplatelet therapy with aspirin and Plavix  Recent labs reviewed and discussed with the patient lipids at goal  Continue aspirin statin Plavix beta-blocker for coronary artery disease  Renew Entresto and spironolactone for cardiomyopathy  Increase the dose of Entresto for better control of blood pressure  Repeat echocardiogram in 2 months  Continue close monitoring  Follow-up in office in 6 months      Objective:    Vitals:  Vitals:    11/04/20 0833   BP: 180/97   BP Location: Left arm   Pulse: (!) 48   Resp: 18   SpO2:  "99%   Weight: 96.2 kg (212 lb)   Height: 177.8 cm (70\")       Physical Exam:    General: Alert, cooperative, no distress, appears stated age  Head:  Normocephalic, atraumatic, mucous membranes moist  Eyes:  Conjunctiva/corneas clear, EOM's intact     Neck:  Supple,  no adenopathy;      Lungs: Clear to auscultation bilaterally, no wheezes rhonchi rales are noted  Chest wall: No tenderness  Heart::  Regular rate and rhythm, S1 and S2 normal, displaced LV apical impulse  Abdomen: Soft, non-tender, nondistended bowel sounds active  Extremities: No cyanosis, clubbing, or edema  Pulses: 2+ and symmetric all extremities  Skin:  No rashes or lesions  Neuro/psych: A&O x3. CN II through XII are grossly intact with appropriate affect      Allergies:  No Known Allergies    Medication Review:     Current Outpatient Medications:   •  aspirin (aspirin) 81 MG EC tablet, Take 1 tablet by mouth Daily., Disp: 30 tablet, Rfl: 0  •  atorvastatin (LIPITOR) 40 MG tablet, Take 1 tablet by mouth Every Night., Disp: 30 tablet, Rfl: 0  •  clopidogrel (PLAVIX) 75 MG tablet, TAKE 1 TABLET BY MOUTH EVERY DAY , Disp: 30 tablet, Rfl: 0  •  Entresto 49-51 MG tablet, TAKE 1 TABLET BY MOUTH TWO TIMES A DAY , Disp: 30 tablet, Rfl: 0  •  isosorbide mononitrate (IMDUR) 60 MG 24 hr tablet, Take 1 tablet by mouth Daily. 200001, Disp: 30 tablet, Rfl: 0  •  metoprolol tartrate (LOPRESSOR) 25 MG tablet, TAKE 1 TABLET BY MOUTH TWO TIMES A DAY , Disp: 30 tablet, Rfl: 0  •  spironolactone (ALDACTONE) 25 MG tablet, TAKE 1/2 TABLET BY MOUTH ONE TIME A DAY , Disp: 30 tablet, Rfl: 0    Family History:  Family History   Problem Relation Age of Onset   • Cancer Mother    • Heart attack Father        Past Medical History:  Past Medical History:   Diagnosis Date   • Aortic insufficiency     Moderate   • CAD (coronary artery disease)    • Cardiomyopathy (CMS/HCC)     e.f 20%   • Hyperlipidemia    • Hypertension    • Left atrial enlargement        Past surgical " History:  Past Surgical History:   Procedure Laterality Date   • CARDIAC CATHETERIZATION N/A 9/13/2019    Procedure: Left Heart Cath and right heart cath;  Surgeon: Griselda Kowalski MD;  Location: [facility] CATH INVASIVE LOCATION;  Service: Cardiovascular   • CARDIAC CATHETERIZATION N/A 9/16/2019    Procedure: PCI to LAD;  Surgeon: Griselda Kowalski MD;  Location: [facility] CATH INVASIVE LOCATION;  Service: Cardiovascular   • CARDIAC ELECTROPHYSIOLOGY PROCEDURE  9/16/2019    Procedure: Impella Insertion;  Surgeon: Griselda Kowalski MD;  Location: [facility] CATH INVASIVE LOCATION;  Service: Cardiovascular   • CARDIAC ELECTROPHYSIOLOGY PROCEDURE N/A 9/16/2019    Procedure: Impella Removal;  Surgeon: Griselda Kowalski MD;  Location: [facility] CATH INVASIVE LOCATION;  Service: Cardiovascular   • CORONARY ANGIOPLASTY WITH STENT PLACEMENT      RCA and LAD   • [place] RT/LT HEART CATHETERS N/A 9/16/2019    Procedure: Percutaneous Coronary Intervention;  Surgeon: Griselda Kowalski MD;  Location: [facility] CATH INVASIVE LOCATION;  Service: Cardiovascular       Social History:  Social History     Socioeconomic History   • Marital status:      Spouse name: Not on file   • Number of children: Not on file   • Years of education: Not on file   • Highest education level: Not on file   Tobacco Use   • Smoking status: Never Smoker   • Smokeless tobacco: Never Used   Substance and Sexual Activity   • Alcohol use: No     Frequency: Never   • Drug use: No   • Sexual activity: Defer       Review of Systems:  The following systems were reviewed as they relate to the cardiovascular system: Constitutional, Eyes, ENT, Cardiovascular, Respiratory, Gastrointestinal, Integumentary, Neurological, Psychiatric, Hematologic, Endocrine, Musculoskeletal, and Genitourinary. The pertinent cardiovascular findings are reported above with all other cardiovascular points within those systems being negative.    Diagnostic Study Review:      Current Electrocardiogram:    ECG 12 Lead    Date/Time: 11/4/2020 12:21 PM  Performed by: Griselda Kowalski MD  Authorized by: Griselda Kowalski MD   Comparison: compared with previous ECG   Similar to previous ECG  Rhythm: sinus rhythm and sinus bradycardia  Rate: normal  BPM: 48  Conduction: 1st degree AV block and non-specific intraventricular conduction delay  QRS axis: normal  Other findings: non-specific ST-T wave changes    Clinical impression: abnormal EKG              NOTE: The following portions of the patient's history were reviewed and updated this visit as appropriate: allergies, current medications, past family history, past medical history, past social history, past surgical history and problem list.

## 2020-11-11 RX ORDER — ISOSORBIDE MONONITRATE 60 MG/1
TABLET, EXTENDED RELEASE ORAL
Qty: 30 TABLET | Refills: 4 | Status: SHIPPED | OUTPATIENT
Start: 2020-11-11 | End: 2021-04-21

## 2020-12-29 RX ORDER — SPIRONOLACTONE 25 MG/1
TABLET ORAL
Qty: 30 TABLET | Refills: 0 | Status: SHIPPED | OUTPATIENT
Start: 2020-12-29 | End: 2021-03-02

## 2021-01-13 ENCOUNTER — APPOINTMENT (OUTPATIENT)
Dept: CARDIOLOGY | Facility: HOSPITAL | Age: 66
End: 2021-01-13

## 2021-03-03 RX ORDER — ATORVASTATIN CALCIUM 40 MG/1
TABLET, FILM COATED ORAL
Qty: 90 TABLET | Refills: 2 | Status: SHIPPED | OUTPATIENT
Start: 2021-03-03 | End: 2021-08-26 | Stop reason: SDUPTHER

## 2021-03-04 RX ORDER — SPIRONOLACTONE 25 MG/1
TABLET ORAL
Qty: 30 TABLET | Refills: 2 | Status: SHIPPED | OUTPATIENT
Start: 2021-03-04 | End: 2021-08-26 | Stop reason: SDUPTHER

## 2021-04-21 RX ORDER — ISOSORBIDE MONONITRATE 60 MG/1
TABLET, EXTENDED RELEASE ORAL
Qty: 30 TABLET | Refills: 0 | Status: SHIPPED | OUTPATIENT
Start: 2021-04-21 | End: 2021-05-24

## 2021-05-24 RX ORDER — ISOSORBIDE MONONITRATE 60 MG/1
TABLET, EXTENDED RELEASE ORAL
Qty: 30 TABLET | Refills: 3 | Status: SHIPPED | OUTPATIENT
Start: 2021-05-24 | End: 2021-08-26

## 2021-08-23 RX ORDER — SPIRONOLACTONE 25 MG/1
TABLET ORAL
Qty: 30 TABLET | Refills: 0 | OUTPATIENT
Start: 2021-08-23

## 2021-08-26 ENCOUNTER — OFFICE VISIT (OUTPATIENT)
Dept: CARDIOLOGY | Facility: CLINIC | Age: 66
End: 2021-08-26

## 2021-08-26 VITALS
WEIGHT: 212 LBS | DIASTOLIC BLOOD PRESSURE: 85 MMHG | HEART RATE: 57 BPM | SYSTOLIC BLOOD PRESSURE: 139 MMHG | OXYGEN SATURATION: 97 % | BODY MASS INDEX: 30.42 KG/M2

## 2021-08-26 DIAGNOSIS — I42.0 CARDIOMYOPATHY, DILATED (HCC): Primary | ICD-10-CM

## 2021-08-26 DIAGNOSIS — I50.21 ACUTE SYSTOLIC CONGESTIVE HEART FAILURE (HCC): ICD-10-CM

## 2021-08-26 DIAGNOSIS — I42.0 DILATED CARDIOMYOPATHY (HCC): ICD-10-CM

## 2021-08-26 DIAGNOSIS — I10 ESSENTIAL HYPERTENSION: ICD-10-CM

## 2021-08-26 DIAGNOSIS — E78.2 MIXED HYPERLIPIDEMIA: ICD-10-CM

## 2021-08-26 PROCEDURE — 93000 ELECTROCARDIOGRAM COMPLETE: CPT | Performed by: INTERNAL MEDICINE

## 2021-08-26 PROCEDURE — 99214 OFFICE O/P EST MOD 30 MIN: CPT | Performed by: INTERNAL MEDICINE

## 2021-08-26 RX ORDER — CLOPIDOGREL BISULFATE 75 MG/1
75 TABLET ORAL DAILY
Qty: 90 TABLET | Refills: 3 | Status: SHIPPED | OUTPATIENT
Start: 2021-08-26 | End: 2022-09-30

## 2021-08-26 RX ORDER — SACUBITRIL AND VALSARTAN 49; 51 MG/1; MG/1
1 TABLET, FILM COATED ORAL 2 TIMES DAILY
Qty: 180 TABLET | Refills: 3 | Status: SHIPPED | OUTPATIENT
Start: 2021-08-26 | End: 2022-09-20

## 2021-08-26 RX ORDER — SPIRONOLACTONE 25 MG/1
12.5 TABLET ORAL DAILY
Qty: 90 TABLET | Refills: 3 | Status: SHIPPED | OUTPATIENT
Start: 2021-08-26 | End: 2022-09-19

## 2021-08-26 RX ORDER — ATORVASTATIN CALCIUM 40 MG/1
40 TABLET, FILM COATED ORAL NIGHTLY
Qty: 90 TABLET | Refills: 3 | Status: SHIPPED | OUTPATIENT
Start: 2021-08-26 | End: 2022-09-30

## 2021-08-26 RX ORDER — NITROGLYCERIN 0.4 MG/1
TABLET SUBLINGUAL
Qty: 30 TABLET | Refills: 2 | Status: SHIPPED | OUTPATIENT
Start: 2021-08-26

## 2021-08-26 NOTE — PROGRESS NOTES
Cardiology Office Visit      Encounter Date:  08/26/2021    Patient ID:   Devaughn Valdez is a 66 y.o. male.    Reason For Followup:  Cardiomyopathy  Coronary artery disease status post PCI and stenting  Hypertension  Hyperlipidemia    Brief Clinical History:  Dear Zev Polk MD    I had the pleasure of seeing Devaughn Valdez today. As you are well aware, this is a 66 y.o. male  that was recently diagnosed with hypertension and also cardiomegaly and severe cardiomyopathy    Patient has severe cardiomyopathy on echocardiogram with LV ejection fraction of 20%  Moderate to severe mitral regurgitation    PROCEDURE PERFORMED: September 2019  Successful PCI mid LAD with placement of a drug-eluting stent  Successful PCI of the mid right coronary artery with placement of a drug-eluting stent    Interpretation Summary     · The left ventricular cavity is mild-to-moderately dilated.  · Left ventricular wall thickness is consistent with mild concentric hypertrophy.  · Estimated EF = 35%.           Interval History:  Patient denies any symptoms of chest pain shortness of breath dizziness or syncope  Denies any new complaints  Assessment & Plan    Impressions:  Severe cardiomyopathy with improvement of the LV systolic function and ejection fraction from 20% to 35%  Coronary artery disease status post PCI and stenting  Hypertension  Hyperlipidemia    Recommendations:  Continue current dose of Entresto dose  Continue statin beta-blocker and spironolactone  Discontinue isosorbide  Continue dual antiplatelet therapy with aspirin and Plavix  Check labs including CBC CMP and lipids  Continue aspirin statin Plavix beta-blocker for coronary artery disease  Repeat echocardiogram to reassess LV systolic function  Continue close monitoring  Follow-up in office in 6 months    Objective:    Vitals:  Vitals:    08/26/21 1133   BP: 139/85   Pulse: 57   SpO2: 97%   Weight: 96.2 kg (212 lb)       Physical Exam:    General: Alert, cooperative,  no distress, appears stated age  Head:  Normocephalic, atraumatic, mucous membranes moist  Eyes:  Conjunctiva/corneas clear, EOM's intact     Neck:  Supple,  no adenopathy;      Lungs: Clear to auscultation bilaterally, no wheezes rhonchi rales are noted  Chest wall: No tenderness  Heart::  Regular rate and rhythm, S1 and S2 normal, no murmur, rub or gallop  Abdomen: Soft, non-tender, nondistended bowel sounds active  Extremities: No cyanosis, clubbing, or edema  Pulses: 2+ and symmetric all extremities  Skin:  No rashes or lesions  Neuro/psych: A&O x3. CN II through XII are grossly intact with appropriate affect      Allergies:  No Known Allergies    Medication Review:     Current Outpatient Medications:   •  aspirin (aspirin) 81 MG EC tablet, Take 1 tablet by mouth Daily., Disp: 30 tablet, Rfl: 0  •  atorvastatin (LIPITOR) 40 MG tablet, TAKE 1 TABLET BY MOUTH  NIGHTly, Disp: 90 tablet, Rfl: 2  •  clopidogrel (PLAVIX) 75 MG tablet, Take 1 tablet by mouth Daily., Disp: 90 tablet, Rfl: 3  •  isosorbide mononitrate (IMDUR) 60 MG 24 hr tablet, TAKE 1 TABLET BY MOUTH EVERY DAY , Disp: 30 tablet, Rfl: 3  •  metoprolol tartrate (LOPRESSOR) 25 MG tablet, Take 1 tablet by mouth 2 (Two) Times a Day., Disp: 180 tablet, Rfl: 3  •  sacubitril-valsartan (Entresto) 49-51 MG tablet, Take 1 tablet by mouth 2 (Two) Times a Day., Disp: 180 tablet, Rfl: 3  •  spironolactone (ALDACTONE) 25 MG tablet, TAKE 1/2 TABLET BY MOUTH ONE TIME A DAY , Disp: 30 tablet, Rfl: 2    Family History:  Family History   Problem Relation Age of Onset   • Cancer Mother    • Heart attack Father        Past Medical History:  Past Medical History:   Diagnosis Date   • Aortic insufficiency     Moderate   • CAD (coronary artery disease)    • Cardiomyopathy (CMS/HCC)     e.f 20%   • Hyperlipidemia    • Hypertension    • Left atrial enlargement        Past surgical History:  Past Surgical History:   Procedure Laterality Date   • CARDIAC CATHETERIZATION N/A  9/13/2019    Procedure: Left Heart Cath and right heart cath;  Surgeon: Griselda Kowalski MD;  Location: [facility] CATH INVASIVE LOCATION;  Service: Cardiovascular   • CARDIAC CATHETERIZATION N/A 9/16/2019    Procedure: PCI to LAD;  Surgeon: Griselda Kowalski MD;  Location: [facility] CATH INVASIVE LOCATION;  Service: Cardiovascular   • CARDIAC ELECTROPHYSIOLOGY PROCEDURE  9/16/2019    Procedure: Impella Insertion;  Surgeon: Griselda Kowalski MD;  Location: [facility] CATH INVASIVE LOCATION;  Service: Cardiovascular   • CARDIAC ELECTROPHYSIOLOGY PROCEDURE N/A 9/16/2019    Procedure: Impella Removal;  Surgeon: Griselda Kowalski MD;  Location: [facility] CATH INVASIVE LOCATION;  Service: Cardiovascular   • CORONARY ANGIOPLASTY WITH STENT PLACEMENT      RCA and LAD   • [facility] RT/LT HEART CATHETERS N/A 9/16/2019    Procedure: Percutaneous Coronary Intervention;  Surgeon: Griselda Kowalski MD;  Location: [facility] CATH INVASIVE LOCATION;  Service: Cardiovascular       Social History:  Social History     Socioeconomic History   • Marital status:      Spouse name: Not on file   • Number of children: Not on file   • Years of education: Not on file   • Highest education level: Not on file   Tobacco Use   • Smoking status: Never Smoker   • Smokeless tobacco: Never Used   Substance and Sexual Activity   • Alcohol use: No   • Drug use: No   • Sexual activity: Defer       Review of Systems:  The following systems were reviewed as they relate to the cardiovascular system: Constitutional, Eyes, ENT, Cardiovascular, Respiratory, Gastrointestinal, Integumentary, Neurological, Psychiatric, Hematologic, Endocrine, Musculoskeletal, and Genitourinary. The pertinent cardiovascular findings are reported above with all other cardiovascular points within those systems being negative.    Diagnostic Study Review:     Current Electrocardiogram:    ECG 12 Lead    Date/Time: 8/26/2021 11:51 AM  Performed by: Griselda Kowalski  MD ELDA  Authorized by: Griselda Kowalski MD   Comparison: compared with previous ECG   Similar to previous ECG  Rhythm: sinus rhythm  Rate: normal  BPM: 57  Conduction: 1st degree AV block and non-specific intraventricular conduction delay  QRS axis: left  Other findings: non-specific ST-T wave changes    Clinical impression: abnormal EKG              NOTE: The following portions of the patient's history were reviewed and updated this visit as appropriate: allergies, current medications, past family history, past medical history, past social history, past surgical history and problem list.

## 2021-09-28 ENCOUNTER — APPOINTMENT (OUTPATIENT)
Dept: CARDIOLOGY | Facility: HOSPITAL | Age: 66
End: 2021-09-28

## 2021-10-08 RX ORDER — ISOSORBIDE MONONITRATE 60 MG/1
TABLET, EXTENDED RELEASE ORAL
Qty: 30 TABLET | Refills: 0 | OUTPATIENT
Start: 2021-10-08

## 2021-11-19 NOTE — PLAN OF CARE
Problem: Patient Care Overview  Goal: Plan of Care Review  Outcome: Ongoing (interventions implemented as appropriate)   09/14/19 1025   Coping/Psychosocial   Plan of Care Reviewed With patient;spouse   Plan of Care Review   Progress improving   OTHER   Outcome Summary continue to monitor kidney function; plan for heart cath on monday.       Problem: Hypertensive Disease/Crisis (Arterial) (Adult)  Goal: Signs and Symptoms of Listed Potential Problems Will be Absent, Minimized or Managed (Hypertensive Disease/Crisis)  Outcome: Ongoing (interventions implemented as appropriate)         none

## 2022-08-24 DIAGNOSIS — I42.0 DILATED CARDIOMYOPATHY: Primary | ICD-10-CM

## 2022-08-24 DIAGNOSIS — I50.21 ACUTE SYSTOLIC CONGESTIVE HEART FAILURE: ICD-10-CM

## 2022-09-12 ENCOUNTER — LAB (OUTPATIENT)
Dept: LAB | Facility: HOSPITAL | Age: 67
End: 2022-09-12

## 2022-09-12 DIAGNOSIS — I42.0 DILATED CARDIOMYOPATHY: ICD-10-CM

## 2022-09-12 DIAGNOSIS — I50.21 ACUTE SYSTOLIC CONGESTIVE HEART FAILURE: ICD-10-CM

## 2022-09-12 LAB
ANION GAP SERPL CALCULATED.3IONS-SCNC: 13.3 MMOL/L (ref 5–15)
BASOPHILS # BLD AUTO: 0.03 10*3/MM3 (ref 0–0.2)
BASOPHILS NFR BLD AUTO: 0.5 % (ref 0–1.5)
BUN SERPL-MCNC: 18 MG/DL (ref 8–23)
BUN/CREAT SERPL: 17.6 (ref 7–25)
CALCIUM SPEC-SCNC: 9.3 MG/DL (ref 8.6–10.5)
CHLORIDE SERPL-SCNC: 106 MMOL/L (ref 98–107)
CHOLEST SERPL-MCNC: 113 MG/DL (ref 0–200)
CO2 SERPL-SCNC: 26.7 MMOL/L (ref 22–29)
CREAT SERPL-MCNC: 1.02 MG/DL (ref 0.76–1.27)
DEPRECATED RDW RBC AUTO: 41.8 FL (ref 37–54)
EGFRCR SERPLBLD CKD-EPI 2021: 81.6 ML/MIN/1.73
EOSINOPHIL # BLD AUTO: 0.14 10*3/MM3 (ref 0–0.4)
EOSINOPHIL NFR BLD AUTO: 2.2 % (ref 0.3–6.2)
ERYTHROCYTE [DISTWIDTH] IN BLOOD BY AUTOMATED COUNT: 12.8 % (ref 12.3–15.4)
GLUCOSE SERPL-MCNC: 77 MG/DL (ref 65–99)
HCT VFR BLD AUTO: 42.9 % (ref 37.5–51)
HDLC SERPL-MCNC: 40 MG/DL (ref 40–60)
HGB BLD-MCNC: 14 G/DL (ref 13–17.7)
IMM GRANULOCYTES # BLD AUTO: 0.01 10*3/MM3 (ref 0–0.05)
IMM GRANULOCYTES NFR BLD AUTO: 0.2 % (ref 0–0.5)
LDLC SERPL CALC-MCNC: 47 MG/DL (ref 0–100)
LDLC/HDLC SERPL: 1.05 {RATIO}
LYMPHOCYTES # BLD AUTO: 2.13 10*3/MM3 (ref 0.7–3.1)
LYMPHOCYTES NFR BLD AUTO: 33 % (ref 19.6–45.3)
MCH RBC QN AUTO: 29.4 PG (ref 26.6–33)
MCHC RBC AUTO-ENTMCNC: 32.6 G/DL (ref 31.5–35.7)
MCV RBC AUTO: 90.1 FL (ref 79–97)
MONOCYTES # BLD AUTO: 0.55 10*3/MM3 (ref 0.1–0.9)
MONOCYTES NFR BLD AUTO: 8.5 % (ref 5–12)
NEUTROPHILS NFR BLD AUTO: 3.59 10*3/MM3 (ref 1.7–7)
NEUTROPHILS NFR BLD AUTO: 55.6 % (ref 42.7–76)
NRBC BLD AUTO-RTO: 0 /100 WBC (ref 0–0.2)
PLATELET # BLD AUTO: 125 10*3/MM3 (ref 140–450)
PMV BLD AUTO: 12.8 FL (ref 6–12)
POTASSIUM SERPL-SCNC: 3.9 MMOL/L (ref 3.5–5.2)
RBC # BLD AUTO: 4.76 10*6/MM3 (ref 4.14–5.8)
SODIUM SERPL-SCNC: 146 MMOL/L (ref 136–145)
TRIGL SERPL-MCNC: 156 MG/DL (ref 0–150)
VLDLC SERPL-MCNC: 26 MG/DL (ref 5–40)
WBC NRBC COR # BLD: 6.45 10*3/MM3 (ref 3.4–10.8)

## 2022-09-12 PROCEDURE — 85025 COMPLETE CBC W/AUTO DIFF WBC: CPT

## 2022-09-12 PROCEDURE — 80061 LIPID PANEL: CPT

## 2022-09-12 PROCEDURE — 80048 BASIC METABOLIC PNL TOTAL CA: CPT

## 2022-09-12 PROCEDURE — 36415 COLL VENOUS BLD VENIPUNCTURE: CPT

## 2022-09-13 ENCOUNTER — HOSPITAL ENCOUNTER (OUTPATIENT)
Dept: CARDIOLOGY | Facility: HOSPITAL | Age: 67
Discharge: HOME | End: 2022-09-13
Admitting: INTERNAL MEDICINE

## 2022-09-13 VITALS
SYSTOLIC BLOOD PRESSURE: 177 MMHG | WEIGHT: 212 LBS | HEIGHT: 70 IN | DIASTOLIC BLOOD PRESSURE: 105 MMHG | BODY MASS INDEX: 30.35 KG/M2 | HEART RATE: 57 BPM

## 2022-09-13 DIAGNOSIS — I50.21 ACUTE SYSTOLIC CONGESTIVE HEART FAILURE: ICD-10-CM

## 2022-09-13 DIAGNOSIS — I42.0 DILATED CARDIOMYOPATHY: ICD-10-CM

## 2022-09-13 PROCEDURE — 93306 TTE W/DOPPLER COMPLETE: CPT | Performed by: INTERNAL MEDICINE

## 2022-09-13 PROCEDURE — 93306 TTE W/DOPPLER COMPLETE: CPT

## 2022-09-15 LAB
ASCENDING AORTA: 3.4 CM
BH CV ECHO MEAS - ACS: 2.42 CM
BH CV ECHO MEAS - AI P1/2T: 634.7 MSEC
BH CV ECHO MEAS - AO MAX PG: 8.4 MMHG
BH CV ECHO MEAS - AO MEAN PG: 3.8 MMHG
BH CV ECHO MEAS - AO ROOT DIAM: 3.9 CM
BH CV ECHO MEAS - AO V2 MAX: 144.5 CM/SEC
BH CV ECHO MEAS - AO V2 VTI: 29 CM
BH CV ECHO MEAS - AVA(I,D): 3.7 CM2
BH CV ECHO MEAS - EDV(CUBED): 177.4 ML
BH CV ECHO MEAS - EDV(MOD-SP2): 169.4 ML
BH CV ECHO MEAS - EDV(MOD-SP4): 196.3 ML
BH CV ECHO MEAS - EF(MOD-BP): 44 %
BH CV ECHO MEAS - EF(MOD-SP2): 48.3 %
BH CV ECHO MEAS - EF(MOD-SP4): 39.9 %
BH CV ECHO MEAS - ESV(CUBED): 87.1 ML
BH CV ECHO MEAS - ESV(MOD-SP2): 87.6 ML
BH CV ECHO MEAS - ESV(MOD-SP4): 117.9 ML
BH CV ECHO MEAS - FS: 21.1 %
BH CV ECHO MEAS - IVS/LVPW: 1.3 CM
BH CV ECHO MEAS - IVSD: 1.66 CM
BH CV ECHO MEAS - LA DIMENSION: 4.7 CM
BH CV ECHO MEAS - LAT PEAK E' VEL: 4 CM/SEC
BH CV ECHO MEAS - LV DIASTOLIC VOL/BSA (35-75): 91.8 CM2
BH CV ECHO MEAS - LV MASS(C)D: 374.1 GRAMS
BH CV ECHO MEAS - LV MAX PG: 4 MMHG
BH CV ECHO MEAS - LV MEAN PG: 1.88 MMHG
BH CV ECHO MEAS - LV SYSTOLIC VOL/BSA (12-30): 55.1 CM2
BH CV ECHO MEAS - LV V1 MAX: 100.1 CM/SEC
BH CV ECHO MEAS - LV V1 VTI: 22.7 CM
BH CV ECHO MEAS - LVIDD: 5.6 CM
BH CV ECHO MEAS - LVIDS: 4.4 CM
BH CV ECHO MEAS - LVOT AREA: 4.7 CM2
BH CV ECHO MEAS - LVOT DIAM: 2.45 CM
BH CV ECHO MEAS - LVPWD: 1.28 CM
BH CV ECHO MEAS - MED PEAK E' VEL: 4 CM/SEC
BH CV ECHO MEAS - MV A MAX VEL: 78.1 CM/SEC
BH CV ECHO MEAS - MV DEC SLOPE: 126 CM/SEC2
BH CV ECHO MEAS - MV DEC TIME: 0.42 MSEC
BH CV ECHO MEAS - MV E MAX VEL: 53.2 CM/SEC
BH CV ECHO MEAS - MV E/A: 0.68
BH CV ECHO MEAS - MV MAX PG: 3.4 MMHG
BH CV ECHO MEAS - MV MEAN PG: 1.17 MMHG
BH CV ECHO MEAS - MV P1/2T: 51 MSEC
BH CV ECHO MEAS - MV V2 VTI: 24 CM
BH CV ECHO MEAS - MVA(P1/2T): 4.3 CM2
BH CV ECHO MEAS - MVA(VTI): 4.4 CM2
BH CV ECHO MEAS - PA ACC SLOPE: 454 CM/SEC2
BH CV ECHO MEAS - PA ACC TIME: 0.09 SEC
BH CV ECHO MEAS - PA PR(ACCEL): 37.8 MMHG
BH CV ECHO MEAS - PA V2 MAX: 79.5 CM/SEC
BH CV ECHO MEAS - PAPD(PI EDV): 15 MMHG
BH CV ECHO MEAS - PI END-D VEL: 135.1 CM/SEC
BH CV ECHO MEAS - PULM A REVS DUR: 0.13 SEC
BH CV ECHO MEAS - PULM A REVS VEL: 25.6 CM/SEC
BH CV ECHO MEAS - PULM DIAS VEL: 40.9 CM/SEC
BH CV ECHO MEAS - PULM S/D: 1.29
BH CV ECHO MEAS - PULM SYS VEL: 52.8 CM/SEC
BH CV ECHO MEAS - QP/QS: 0.61
BH CV ECHO MEAS - RAP SYSTOLE: 8 MMHG
BH CV ECHO MEAS - RV MAX PG: 0.76 MMHG
BH CV ECHO MEAS - RV V1 MAX: 43.7 CM/SEC
BH CV ECHO MEAS - RV V1 VTI: 11 CM
BH CV ECHO MEAS - RVOT DIAM: 2.7 CM
BH CV ECHO MEAS - RVSP: 34.4 MMHG
BH CV ECHO MEAS - SI(MOD-SP2): 38.2 ML/M2
BH CV ECHO MEAS - SI(MOD-SP4): 36.7 ML/M2
BH CV ECHO MEAS - SUP REN AO DIAM: 2.6 CM
BH CV ECHO MEAS - SV(LVOT): 106.8 ML
BH CV ECHO MEAS - SV(MOD-SP2): 81.8 ML
BH CV ECHO MEAS - SV(MOD-SP4): 78.4 ML
BH CV ECHO MEAS - SV(RVOT): 64.9 ML
BH CV ECHO MEAS - TAPSE (>1.6): 2 CM
BH CV ECHO MEAS - TR MAX PG: 26.4 MMHG
BH CV ECHO MEAS - TR MAX VEL: 256.3 CM/SEC
BH CV ECHO MEASUREMENTS AVERAGE E/E' RATIO: 13.3
BH CV VAS BP LEFT ARM: NORMAL MMHG
BH CV XLRA - RV BASE: 4.3 CM
BH CV XLRA - RV MID: 4.5 CM
BH CV XLRA - TDI S': 12 CM/SEC
IVRT: 136 MSEC
LEFT ATRIUM VOLUME INDEX: 40 ML/M2
LEFT ATRIUM VOLUME: 86 ML
LV EF 2D ECHO EST: 40 %
MAXIMAL PREDICTED HEART RATE: 155 BPM
PV VALVE AREA: 3.9 CM2
SINUS: 4.1 CM
STJ: 2.9 CM
STRESS TARGET HR: 132 BPM

## 2022-09-19 ENCOUNTER — TELEPHONE (OUTPATIENT)
Dept: FAMILY MEDICINE CLINIC | Facility: CLINIC | Age: 67
End: 2022-09-19

## 2022-09-19 RX ORDER — SPIRONOLACTONE 25 MG/1
TABLET ORAL
Qty: 90 TABLET | Refills: 0 | Status: SHIPPED | OUTPATIENT
Start: 2022-09-19

## 2022-09-19 NOTE — TELEPHONE ENCOUNTER
Please call pt and see if he would make appt with Dr. Meyers as Dr. Li been gone 2 years no and our records still have [facility] as his primary.

## 2022-09-19 NOTE — TELEPHONE ENCOUNTER
HUB TO SHARE: UNABLE TO REACH PATIENT SO SENT FOLLOWING [employer] MESSAGE. We still have you listed as a patient of Dr Li and he has not been with our office in 1.5 years. We have a new provider accepting patients. Please call the office at [phone] to schedule with Dr. Chuck Meyers.

## 2022-09-20 ENCOUNTER — OFFICE VISIT (OUTPATIENT)
Dept: CARDIOLOGY | Facility: CLINIC | Age: 67
End: 2022-09-20

## 2022-09-20 VITALS
HEIGHT: 70 IN | HEART RATE: 61 BPM | WEIGHT: 210 LBS | DIASTOLIC BLOOD PRESSURE: 82 MMHG | SYSTOLIC BLOOD PRESSURE: 184 MMHG | OXYGEN SATURATION: 97 % | BODY MASS INDEX: 30.06 KG/M2 | RESPIRATION RATE: 18 BRPM

## 2022-09-20 DIAGNOSIS — I34.0 NON-RHEUMATIC MITRAL REGURGITATION: ICD-10-CM

## 2022-09-20 DIAGNOSIS — E78.2 MIXED HYPERLIPIDEMIA: ICD-10-CM

## 2022-09-20 DIAGNOSIS — I42.0 DILATED CARDIOMYOPATHY: Primary | ICD-10-CM

## 2022-09-20 DIAGNOSIS — I25.119 CORONARY ARTERY DISEASE INVOLVING NATIVE CORONARY ARTERY OF NATIVE HEART WITH ANGINA PECTORIS: ICD-10-CM

## 2022-09-20 DIAGNOSIS — I42.0 CARDIOMYOPATHY, DILATED: ICD-10-CM

## 2022-09-20 PROCEDURE — 93000 ELECTROCARDIOGRAM COMPLETE: CPT | Performed by: INTERNAL MEDICINE

## 2022-09-20 PROCEDURE — 99214 OFFICE O/P EST MOD 30 MIN: CPT | Performed by: INTERNAL MEDICINE

## 2022-09-20 RX ORDER — LOSARTAN POTASSIUM 50 MG/1
50 TABLET ORAL DAILY
Qty: 90 TABLET | Refills: 3 | Status: SHIPPED | OUTPATIENT
Start: 2022-09-20 | End: 2023-02-17 | Stop reason: SDUPTHER

## 2022-09-20 RX ORDER — METOPROLOL SUCCINATE 25 MG/1
25 TABLET, EXTENDED RELEASE ORAL DAILY
Qty: 90 TABLET | Refills: 3 | Status: SHIPPED | OUTPATIENT
Start: 2022-09-20

## 2022-09-20 NOTE — PROGRESS NOTES
Cardiology Office Visit      Encounter Date:  09/20/2022    Patient ID:   Devaughn Vadlez is a 67 y.o. male.      Reason For Followup:  Cardiomyopathy  Coronary artery disease status post PCI and stenting  Hypertension  Hyperlipidemia    Brief Clinical History:  Dear Zev Polk MD    I had the pleasure of seeing Devaughn Valdez today. As you are well aware, this is a 67 y.o. male  that was recently diagnosed with hypertension and also cardiomegaly and severe cardiomyopathy    Patient has severe cardiomyopathy on echocardiogram with LV ejection fraction of 20%  Moderate to severe mitral regurgitation    PROCEDURE PERFORMED: September 2019  Successful PCI mid LAD with placement of a drug-eluting stent  Successful PCI of the mid right coronary artery with placement of a drug-eluting stent    Interpretation Summary     · The left ventricular cavity is mild-to-moderately dilated.  · Left ventricular wall thickness is consistent with mild concentric hypertrophy.  · Estimated EF = 35%.           Interval History:  Patient denies any symptoms of chest pain shortness of breath dizziness or syncope  Denies any new complaints  Assessment & Plan    Impressions:  Severe cardiomyopathy with improvement of the LV systolic function and ejection fraction from 20% to 35%/most recent echocardiogram with LV ejection fraction of 40%  Coronary artery disease status post PCI and stenting  Hypertension  Hyperlipidemia  Nonsustained ventricular tachycardia during echocardiogram  Moderate mitral regurgitation  Moderate aortic regurgitation  Impaired relaxation    Recommendations:  Patient unable to afford Entresto secondary to co-pay issues  He stopped taking Entresto for almost a month and blood pressure started increased  Start patient on losartan in place of Entresto  Start patient on Jardiance 10 mg p.o. once a day  Continue statin beta-blocker and spironolactone  Discontinue isosorbide  Continue dual antiplatelet therapy with aspirin  "and Plavix  Check labs including CBC CMP and lipids  Continue aspirin statin Plavix beta-blocker for coronary artery disease  Depending of the results of the Holter monitor consider EP evaluation for possible cardiac resynchronization therapy secondary to prolonged QRS and ongoing LV dysfunction and valvular heart disease  Significant improvement in the LV systolic function from the time of her diagnosis of cardiomyopathy  Coronary artery disease status post PCI  Continue current medical therapy with aspirin 81 mg p.o. once a day Plavix 75 mg p.o. once a day Lipitor 40 mg p.o. once a day Jardiance 10 mg p.o. once a day losartan 50 mg p.o. once a day Toprol-XL 25 mg p.o. once a day Aldactone 25 mg p.o. once a day  Continue close monitoring  Follow-up in office in 6 months      Objective:    Vitals:  Vitals:    09/20/22 1255   BP: (!) 184/82   BP Location: Left arm   Patient Position: Sitting   Cuff Size: Large Adult   Pulse: 61   Resp: 18   SpO2: 97%   Weight: 95.3 kg (210 lb)   Height: 177.8 cm (70\")       Physical Exam:    General: Alert, cooperative, no distress, appears stated age  Head:  Normocephalic, atraumatic, mucous membranes moist  Eyes:  Conjunctiva/corneas clear, EOM's intact     Neck:  Supple,  no adenopathy;      Lungs: Clear to auscultation bilaterally, no wheezes rhonchi rales are noted  Chest wall: No tenderness  Heart::  Regular rate and rhythm, S1 and S2 normal, no murmur, rub or gallop  Abdomen: Soft, non-tender, nondistended bowel sounds active  Extremities: No cyanosis, clubbing, or edema  Pulses: 2+ and symmetric all extremities  Skin:  No rashes or lesions  Neuro/psych: A&O x3. CN II through XII are grossly intact with appropriate affect      Allergies:  No Known Allergies    Medication Review:     Current Outpatient Medications:   •  aspirin (aspirin) 81 MG EC tablet, Take 1 tablet by mouth Daily., Disp: 30 tablet, Rfl: 0  •  atorvastatin (LIPITOR) 40 MG tablet, Take 1 tablet by mouth Every " Night., Disp: 90 tablet, Rfl: 3  •  clopidogrel (PLAVIX) 75 MG tablet, Take 1 tablet by mouth Daily., Disp: 90 tablet, Rfl: 3  •  nitroglycerin (NITROSTAT) 0.4 MG SL tablet, 1 under the tongue as needed for angina, may repeat q5mins for up three doses, Disp: 30 tablet, Rfl: 2  •  spironolactone (ALDACTONE) 25 MG tablet, TAKE 1/2 TABLET BY MOUTH ONE TIME A DAY, Disp: 90 tablet, Rfl: 0  •  empagliflozin (Jardiance) 10 MG tablet tablet, Take 1 tablet by mouth Daily., Disp: 90 tablet, Rfl: 3  •  losartan (COZAAR) 50 MG tablet, Take 1 tablet by mouth Daily., Disp: 90 tablet, Rfl: 3  •  metoprolol succinate XL (TOPROL-XL) 25 MG 24 hr tablet, Take 1 tablet by mouth Daily., Disp: 90 tablet, Rfl: 3    Family History:  Family History   Problem Relation Age of Onset   • Cancer Mother    • Heart attack Father        Past Medical History:  Past Medical History:   Diagnosis Date   • Aortic insufficiency     Moderate   • Atrial fibrillation ([facility]) august 2019   • CAD (coronary artery disease)    • Cardiomyopathy ([facility])     e.f 20%   • CHF (congestive heart failure) ([facility]) August 2019   • Hyperlipidemia    • Hypertension    • Left atrial enlargement        Past surgical History:  Past Surgical History:   Procedure Laterality Date   • CARDIAC CATHETERIZATION N/A 09/13/2019    Procedure: Left Heart Cath and right heart cath;  Surgeon: Griselda Kowalski MD;  Location: [facility] INVASIVE LOCATION;  Service: Cardiovascular   • CARDIAC CATHETERIZATION N/A 09/16/2019    Procedure: PCI to LAD;  Surgeon: Griselda Kowalski MD;  Location: [facility] CATH INVASIVE LOCATION;  Service: Cardiovascular   • CARDIAC ELECTROPHYSIOLOGY PROCEDURE  09/16/2019    Procedure: Impella Insertion;  Surgeon: Griselda Kowalski MD;  Location: [facility] CATH INVASIVE LOCATION;  Service: Cardiovascular   • CARDIAC ELECTROPHYSIOLOGY PROCEDURE N/A 09/16/2019    Procedure: Impella Removal;  Surgeon: Griselda Kowalski MD;  Location: [facility] CATH  INVASIVE LOCATION;  Service: Cardiovascular   • CORONARY ANGIOPLASTY WITH STENT PLACEMENT      RCA and LAD   • [facility] RT/LT HEART CATHETERS N/A 09/16/2019    Procedure: Percutaneous Coronary Intervention;  Surgeon: Griselda oKwalski MD;  Location: [facility] INVASIVE LOCATION;  Service: Cardiovascular       Social History:  Social History     Socioeconomic History   • Marital status:    Tobacco Use   • Smoking status: Never Smoker   • Smokeless tobacco: Never Used   Vaping Use   • Vaping Use: Never used   Substance and Sexual Activity   • Alcohol use: No   • Drug use: No   • Sexual activity: Yes     Partners: Female       Review of Systems:  The following systems were reviewed as they relate to the cardiovascular system: Constitutional, Eyes, ENT, Cardiovascular, Respiratory, Gastrointestinal, Integumentary, Neurological, Psychiatric, Hematologic, Endocrine, Musculoskeletal, and Genitourinary. The pertinent cardiovascular findings are reported above with all other cardiovascular points within those systems being negative.    Diagnostic Study Review:     Current Electrocardiogram:    ECG 12 Lead    Date/Time: 9/20/2022 5:08 PM  Performed by: Griselda Kowalski MD  Authorized by: Griselda Kowalski MD   Comparison: compared with previous ECG   Similar to previous ECG  Rhythm: sinus rhythm  Rate: normal  BPM: 60  Conduction: non-specific intraventricular conduction delay  QRS axis: normal  Other findings: non-specific ST-T wave changes    Clinical impression: abnormal EKG              NOTE: The following portions of the patient's history were reviewed and updated this visit as appropriate: allergies, current medications, past family history, past medical history, past social history, past surgical history and problem list.

## 2022-09-30 RX ORDER — CLOPIDOGREL BISULFATE 75 MG/1
TABLET ORAL
Qty: 90 TABLET | Refills: 0 | Status: SHIPPED | OUTPATIENT
Start: 2022-09-30 | End: 2023-01-05

## 2022-09-30 RX ORDER — ATORVASTATIN CALCIUM 40 MG/1
40 TABLET, FILM COATED ORAL NIGHTLY
Qty: 90 TABLET | Refills: 0 | Status: SHIPPED | OUTPATIENT
Start: 2022-09-30 | End: 2023-01-05

## 2022-10-10 ENCOUNTER — APPOINTMENT (OUTPATIENT)
Dept: CARDIOLOGY | Facility: HOSPITAL | Age: 67
End: 2022-10-10

## 2022-11-21 ENCOUNTER — APPOINTMENT (OUTPATIENT)
Dept: CARDIOLOGY | Facility: HOSPITAL | Age: 67
End: 2022-11-21

## 2022-11-29 DIAGNOSIS — I45.81 LONG QT SYNDROME: Primary | ICD-10-CM

## 2022-12-01 ENCOUNTER — HOSPITAL ENCOUNTER (OUTPATIENT)
Dept: CARDIOLOGY | Facility: HOSPITAL | Age: 67
Discharge: HOME | End: 2022-12-01

## 2022-12-01 DIAGNOSIS — I45.81 LONG QT SYNDROME: ICD-10-CM

## 2023-01-04 LAB
MAXIMAL PREDICTED HEART RATE: 155 BPM
STRESS TARGET HR: 132 BPM

## 2023-01-04 PROCEDURE — 93228 REMOTE 30 DAY ECG REV/REPORT: CPT | Performed by: INTERNAL MEDICINE

## 2023-01-04 PROCEDURE — 99999 PR OFFICE/OUTPT VISIT,PROCEDURE ONLY: CPT | Performed by: INTERNAL MEDICINE

## 2023-01-05 RX ORDER — CLOPIDOGREL BISULFATE 75 MG/1
TABLET ORAL
Qty: 90 TABLET | Refills: 0 | Status: SHIPPED | OUTPATIENT
Start: 2023-01-05 | End: 2023-04-06

## 2023-01-05 RX ORDER — ATORVASTATIN CALCIUM 40 MG/1
TABLET, FILM COATED ORAL
Qty: 90 TABLET | Refills: 0 | Status: SHIPPED | OUTPATIENT
Start: 2023-01-05 | End: 2023-04-06

## 2023-02-14 NOTE — PROGRESS NOTES
Reason for follow-up: Coronary artery disease  Cardiomyopathy  Mitral insufficiency  Aortic insufficiency  Hypertension and dyslipidemia     Patient Care Team:  Zev Li MD as PCP - General (Family Medicine)     Assessment and plan:      Dilated cardiomyopathy (CMS/HCC)    Acute systolic congestive heart failure (CMS/HCC)    Orthopnea    Essential hypertension    Coronary artery disease involving native coronary artery of native heart with angina pectoris (CMS/HCC)    Non-rheumatic mitral regurgitation    Nonrheumatic aortic valve insufficiency    Severe cardiomyopathy  Patient underwent cardiac catheter showed two-vessel CAD  Does have underlying renal failure valvular disease  Patient was seen by CV surgery and was advised PCI    Cardiac catheterization  Successful PCI mid LAD with placement of a drug-eluting stent  Successful PCI of the mid right coronary artery with placement of a drug-eluting stent  Successful insertion of the Impella left ventricular percutaneous ventricular assist device  Successful removal of the Impella left ventricular percutaneous ventricular assist device  Hemostasis of the left common femoral artery with a Perclose closure device     RECOMMENDATIONS:   Aspirin 81 mg p.o. once a day  Plavix 75 mg p.o. once a day  Observe patient in CVCU bed overnight  Continued aggressive risk factor modification  Medical therapy for cardiomyopathy  Test results discussed with the patient and family    Subjective .   Feels good     Review of Systems   Constitution: Positive for malaise/fatigue. Negative for fever.   HENT: Negative for congestion and hearing loss.    Eyes: Negative for double vision and visual disturbance.   Cardiovascular: Negative for chest pain, claudication, dyspnea on exertion, leg swelling and syncope.   Respiratory: Negative for cough and shortness of breath.    Endocrine: Negative for cold intolerance.   Skin: Negative for color change and rash.   Musculoskeletal:  "/83 (BP Location: Right arm)   Pulse 64   Temp 97.8  F (36.6  C) (Oral)   Resp 16   Ht 1.575 m (5' 2\")   Wt 94.8 kg (209 lb)   SpO2 100%   BMI 38.23 kg/m      Patient's condition and vital Signs are stable/WNL. Discharge instructions reviewed with patient and questions answered. Patient verbalizes understanding. PIV removed. Pain under control.  Patient is tolerating regular diet and denies any N/V. Patient to be discharged to home via family. Patient has all belongings.    " "Negative for arthritis and joint pain.   Gastrointestinal: Negative for abdominal pain and heartburn.   Genitourinary: Negative for hematuria.   Neurological: Negative for excessive daytime sleepiness and dizziness.   Psychiatric/Behavioral: Negative for depression. The patient is not nervous/anxious.    All other systems reviewed and are negative.      Patient has no known allergies.    Scheduled Meds:    Acetylcysteine 1,200 mg Oral BID   aspirin 81 mg Oral Daily   atorvastatin 40 mg Oral Nightly   clopidogrel 75 mg Oral Daily   docusate sodium 100 mg Oral BID   famotidine 20 mg Oral Daily   hydrALAZINE 50 mg Oral Q12H   isosorbide mononitrate 30 mg Oral Q24H   metoprolol tartrate 25 mg Oral BID     Continuous Infusions:    sodium chloride 60 mL/hr Last Rate: 60 mL/hr (09/16/19 0713)     PRN Meds:.•  acetaminophen  •  aluminum-magnesium hydroxide-simethicone  •  atropine  •  ondansetron **OR** ondansetron  •  sodium chloride    Objective   Looks comfortable sitting in the chair    VITAL SIGNS  Vitals:    09/15/19 2236 09/16/19 0207 09/16/19 0500 09/16/19 0717   BP: 152/79 148/77  174/87   Pulse: 65 65  70   Resp: 19 20  17   Temp: 98.8 °F (37.1 °C) 99.3 °F (37.4 °C)  98.3 °F (36.8 °C)   TempSrc: Oral Oral  Oral   SpO2: 98% 94%  99%   Weight:   89.2 kg (196 lb 10.4 oz)    Height:           Flowsheet Rows      First Filed Value   Admission Height  172.7 cm (68\") Documented at 09/13/2019 0628   Admission Weight  87 kg (191 lb 12.8 oz) Documented at 09/13/2019 0628           TELEMETRY: Sinus rhythm    Physical Exam:  Physical Exam   Constitutional: He is oriented to person, place, and time. He appears well-developed and well-nourished. He is cooperative.   HENT:   Head: Normocephalic and atraumatic.   Mouth/Throat: Uvula is midline and oropharynx is clear and moist. No oral lesions.   Eyes: Conjunctivae are normal. No scleral icterus.   Neck: Trachea normal. Neck supple. No JVD present. Carotid bruit is not present. " No thyromegaly present.   Cardiovascular: Normal rate, regular rhythm, S1 normal, S2 normal, intact distal pulses and normal pulses. PMI is not displaced. Exam reveals no gallop and no friction rub.   Murmur heard.  Pulmonary/Chest: Effort normal and breath sounds normal.   Abdominal: Soft. Bowel sounds are normal.   Musculoskeletal: Normal range of motion.   Neurological: He is alert and oriented to person, place, and time. He has normal strength.   No focal deficits   Skin: Skin is warm. No cyanosis.   Psychiatric: He has a normal mood and affect.                LAB RESULTS (LAST 7 DAYS)    CBC  Results from last 7 days   Lab Units 09/16/19  0202 09/15/19  0213 09/14/19  0220 09/13/19  1930   WBC 10*3/mm3 7.80 6.90 10.70 11.10*   RBC 10*6/mm3 4.38 4.36 4.49 4.91   HEMOGLOBIN g/dL 13.3 13.1 13.6 14.6   HEMATOCRIT % 39.0 38.6 39.9 43.6   MCV fL 89.0 88.6 88.9 88.9   PLATELETS 10*3/mm3 77* 79* 84* 93*       BMP  Results from last 7 days   Lab Units 09/16/19  0202 09/15/19  0213 09/14/19  0220 09/13/19  1930 09/13/19  0609   SODIUM mmol/L 140 135* 138 140 141   POTASSIUM mmol/L 3.8 3.9 3.9 4.0 4.6   CHLORIDE mmol/L 110 104 108 105 105   CO2 mmol/L 22.0 23.0 24.0 25.0 27.0   BUN mg/dL 15 17 17 17 17   CREATININE mg/dL 1.10 1.00 1.10 1.30* 1.30*   GLUCOSE mg/dL 112* 106* 123* 165* 102*   MAGNESIUM mg/dL  --  1.9 1.8  --   --    PHOSPHORUS mg/dL 3.9 3.5 3.0  --   --        CMP   Results from last 7 days   Lab Units 09/16/19  0202 09/15/19  0213 09/14/19  0220 09/13/19  1930 09/13/19  0609   SODIUM mmol/L 140 135* 138 140 141   POTASSIUM mmol/L 3.8 3.9 3.9 4.0 4.6   CHLORIDE mmol/L 110 104 108 105 105   CO2 mmol/L 22.0 23.0 24.0 25.0 27.0   BUN mg/dL 15 17 17 17 17   CREATININE mg/dL 1.10 1.00 1.10 1.30* 1.30*   GLUCOSE mg/dL 112* 106* 123* 165* 102*   ALBUMIN g/dL 3.30* 3.20* 3.00*  --   --    BILIRUBIN mg/dL  --   --  1.3*  --   --    ALK PHOS U/L  --   --  44  --   --    AST (SGOT) U/L  --   --  21  --   --    ALT  (SGPT) U/L  --   --  26  --   --          BNP        TROPONIN  Results from last 7 days   Lab Units 09/14/19  0220   CK TOTAL U/L 29*       CoAg  Results from last 7 days   Lab Units 09/16/19  0202 09/13/19  1930   INR  1.05 1.03   APTT seconds 25.3 25.8       Creatinine Clearance  Estimated Creatinine Clearance: 78.3 mL/min (by C-G formula based on SCr of 1.1 mg/dL).    ABG        Radiology  No radiology results for the last day          EKG    I personally viewed and interpreted the patient's EKG/Telemetry data:    ECHOCARDIOGRAM:    Results for orders placed during the hospital encounter of 09/13/19   Adult Transesophageal Echo (JENNY) W/ Cont if Necessary Per Protocol    Narrative · Left ventricular systolic function is severely decreased.  · Estimated EF appears to be in the range of less than 20%.  · Moderate aortic valve regurgitation is present.  · Left atrial cavity size is borderline dilated.  · The left ventricular cavity is severely dilated.  · Right ventricular cavity is borderline dilated.  · Mild tricuspid valve regurgitation is present.        STRESS MYOVIEW:    CARDIAC CATHETERIZATION:    OTHER:         Assessment/Plan       Dilated cardiomyopathy (CMS/HCC)    Acute systolic congestive heart failure (CMS/HCC)    Orthopnea    Essential hypertension    Coronary artery disease involving native coronary artery of native heart with angina pectoris (CMS/HCC)    Non-rheumatic mitral regurgitation    Nonrheumatic aortic valve insufficiency    Patient underwent cardiac catheter showed two-vessel CAD  Does have underlying renal failure valvular disease  Patient was seen by CV surgery and was advised PCI    SUMMARY:   Successful PCI mid LAD with placement of a drug-eluting stent  Successful PCI of the mid right coronary artery with placement of a drug-eluting stent  Successful insertion of the Impella left ventricular percutaneous ventricular assist device  Successful removal of the Impella left ventricular  percutaneous ventricular assist device  Hemostasis of the left common femoral artery with a Perclose closure device     RECOMMENDATIONS:   Aspirin 81 mg p.o. once a day  Plavix 75 mg p.o. once a day  Observe patient in CVCU bed overnight  Continued aggressive risk factor modification  Medical therapy for cardiomyopathy  Test results discussed with the patient and family      I discussed the patients findings and my recommendations with patient and family at bedside    Griselda Kowalski MD  09/16/19  11:09 AM

## 2023-02-17 ENCOUNTER — OFFICE VISIT (OUTPATIENT)
Dept: CARDIOLOGY | Facility: CLINIC | Age: 68
End: 2023-02-17
Payer: MEDICARE

## 2023-02-17 VITALS
WEIGHT: 215 LBS | HEART RATE: 54 BPM | BODY MASS INDEX: 30.78 KG/M2 | SYSTOLIC BLOOD PRESSURE: 167 MMHG | HEIGHT: 70 IN | OXYGEN SATURATION: 93 % | DIASTOLIC BLOOD PRESSURE: 82 MMHG

## 2023-02-17 DIAGNOSIS — I44.7 LBBB (LEFT BUNDLE BRANCH BLOCK): ICD-10-CM

## 2023-02-17 DIAGNOSIS — I10 ESSENTIAL HYPERTENSION: ICD-10-CM

## 2023-02-17 DIAGNOSIS — I50.22 CHRONIC SYSTOLIC CONGESTIVE HEART FAILURE: ICD-10-CM

## 2023-02-17 DIAGNOSIS — I25.119 CORONARY ARTERY DISEASE INVOLVING NATIVE CORONARY ARTERY OF NATIVE HEART WITH ANGINA PECTORIS: ICD-10-CM

## 2023-02-17 DIAGNOSIS — I42.0 CARDIOMYOPATHY, DILATED: Primary | ICD-10-CM

## 2023-02-17 DIAGNOSIS — I34.0 NON-RHEUMATIC MITRAL REGURGITATION: ICD-10-CM

## 2023-02-17 PROCEDURE — 99204 OFFICE O/P NEW MOD 45 MIN: CPT | Performed by: INTERNAL MEDICINE

## 2023-02-17 PROCEDURE — 93000 ELECTROCARDIOGRAM COMPLETE: CPT | Performed by: INTERNAL MEDICINE

## 2023-02-17 RX ORDER — LOSARTAN POTASSIUM 100 MG/1
100 TABLET ORAL DAILY
Qty: 90 TABLET | Refills: 1 | Status: SHIPPED | OUTPATIENT
Start: 2023-02-17

## 2023-02-17 NOTE — PROGRESS NOTES
CC--- cardiomyopathy with left bundle branch block and coronary artery disease    Subject    67-year-old male patient has known history of coronary artery disease his ejection fraction measuring as low as 20%.  Patient also has moderate to severe MR and had a PCI to LAD with placement of drug-eluting stent and PCI to mid RCA with placement of a drug-eluting stent back in September 2019.  He has been medically treated for his cardiomyopathy and his EF is improved to 40%.  Patient was sent for evaluation because of significant abnormal EKG with left bundle branch block and persistent LV dysfunction.  Patient also has history of hypertension and hyperlipidemia and had nonsustained VT in the past with moderate MR and moderate AI.  Patient is well compensated and functional class I to class II.  He denies any PND or orthopnea or leg edema.  He denies any angina or syncope.  He has occasional shortness of breath when he is tying his shoes.      Past Medical History:   Diagnosis Date   • Aortic insufficiency     Moderate   • Atrial fibrillation ([facility]) august 2019   • CAD (coronary artery disease)    • Cardiomyopathy ([facility])     e.f 20%   • CHF (congestive heart failure) ([facility]) August 2019   • Hyperlipidemia    • Hypertension    • Left atrial enlargement      Past Surgical History:   Procedure Laterality Date   • CARDIAC CATHETERIZATION N/A 09/13/2019    Procedure: Left Heart Cath and right heart cath;  Surgeon: Griselda Kowalski MD;  Location: [facility] CATH INVASIVE LOCATION;  Service: Cardiovascular   • CARDIAC CATHETERIZATION N/A 09/16/2019    Procedure: PCI to LAD;  Surgeon: Griselda Kowalski MD;  Location: [facility] CATH INVASIVE LOCATION;  Service: Cardiovascular   • CARDIAC ELECTROPHYSIOLOGY PROCEDURE  09/16/2019    Procedure: Impella Insertion;  Surgeon: Griselda Kowalski MD;  Location: [facility] CATH INVASIVE LOCATION;  Service: Cardiovascular   • CARDIAC ELECTROPHYSIOLOGY PROCEDURE N/A 09/16/2019     Procedure: Impella Removal;  Surgeon: Griselda Kowalski MD;  Location: [facility] CATH INVASIVE LOCATION;  Service: Cardiovascular   • CORONARY ANGIOPLASTY WITH STENT PLACEMENT      RCA and LAD   • ND RT/LT HEART CATHETERS N/A 09/16/2019    Procedure: Percutaneous Coronary Intervention;  Surgeon: Griselda Kowalski MD;  Location: [facility] CATH INVASIVE LOCATION;  Service: Cardiovascular       Physical Exam    General:      well developed, well nourished, in no acute distress.    Head:      normocephalic and atraumatic.    Eyes:      PERRL/EOM intact, conjunctivae and sclerae clear without nystagmus.    Neck:      no  thyromegaly, trachea central with normal respiratory effort  Lungs:      clear bilaterally to auscultation.    Heart:       regular rate and rhythm, S1, S2 without murmurs, rubs, or gallops--- paradoxical S2 positive  Skin:      intact without lesions or rashes.    Psych:      alert and cooperative; normal mood and affect; normal attention span and concentration.          Assessment and plan    Chronic ischemic cardiomyopathy  Left bundle branch block  Congestive heart failure well compensated.  Class I to class II  LV ejection fraction measured at 40%  Prior labs reviewed with normal creatinine and potassium of 3.9  Cardiac catheterization and echo reports and cardiology notes reviewed  Essential hypertension not well controlled  Hyperlipidemia treated and well modified on statins  Coronary artery disease stable without angina    Based on the information, no clear-cut indication for device therapy since patient does not have any significant heart failure symptoms and ejection fraction is around 40% and no evidence of syncope.    However significant QRS widening is noted on ECG and I would recommend a cardiac MRI to accurately evaluate his LV ejection fraction and a cardiac MRI has been ordered.    Hypertension is not well controlled and increase the losartan from 50 mg to 100 mg.  Reevaluate BMP  in 2 weeks.  Follow-up in 3 months    Orders placed and follow-up appointments.      ECG 12 Lead    Date/Time: 2/17/2023 9:18 AM  Performed by: Mauro Valencia MD  Authorized by: Mauro Valencia MD   Comparison: compared with previous ECG   Similar to previous ECG  Rhythm: sinus rhythm  Rate: normal  Conduction: left bundle branch block          Electronically signed by Mauro Valencia MD, 02/17/23, 9:18 AM EST.

## 2023-03-17 ENCOUNTER — LAB (OUTPATIENT)
Dept: LAB | Facility: HOSPITAL | Age: 68
End: 2023-03-17
Payer: COMMERCIAL

## 2023-03-17 DIAGNOSIS — I42.0 CARDIOMYOPATHY, DILATED: ICD-10-CM

## 2023-03-17 DIAGNOSIS — I44.7 LBBB (LEFT BUNDLE BRANCH BLOCK): ICD-10-CM

## 2023-03-17 LAB
ANION GAP SERPL CALCULATED.3IONS-SCNC: 13.3 MMOL/L (ref 5–15)
BUN SERPL-MCNC: 22 MG/DL (ref 8–23)
BUN/CREAT SERPL: 22 (ref 7–25)
CALCIUM SPEC-SCNC: 10 MG/DL (ref 8.6–10.5)
CHLORIDE SERPL-SCNC: 98 MMOL/L (ref 98–107)
CO2 SERPL-SCNC: 27.7 MMOL/L (ref 22–29)
CREAT SERPL-MCNC: 1 MG/DL (ref 0.76–1.27)
EGFRCR SERPLBLD CKD-EPI 2021: 83.5 ML/MIN/1.73
GLUCOSE SERPL-MCNC: 89 MG/DL (ref 65–99)
POTASSIUM SERPL-SCNC: 4.6 MMOL/L (ref 3.5–5.2)
SODIUM SERPL-SCNC: 139 MMOL/L (ref 136–145)

## 2023-03-17 PROCEDURE — 36415 COLL VENOUS BLD VENIPUNCTURE: CPT

## 2023-03-17 PROCEDURE — 80048 BASIC METABOLIC PNL TOTAL CA: CPT

## 2023-04-06 RX ORDER — ATORVASTATIN CALCIUM 40 MG/1
TABLET, FILM COATED ORAL
Qty: 90 TABLET | Refills: 0 | Status: SHIPPED | OUTPATIENT
Start: 2023-04-06

## 2023-04-06 RX ORDER — CLOPIDOGREL BISULFATE 75 MG/1
TABLET ORAL
Qty: 90 TABLET | Refills: 0 | Status: SHIPPED | OUTPATIENT
Start: 2023-04-06

## 2023-05-24 RX ORDER — LOSARTAN POTASSIUM 50 MG/1
TABLET ORAL
COMMUNITY
Start: 2023-03-26 | End: 2023-06-02

## 2023-05-30 RX ORDER — SPIRONOLACTONE 25 MG/1
TABLET ORAL
Qty: 90 TABLET | Refills: 0 | Status: SHIPPED | OUTPATIENT
Start: 2023-05-30

## 2023-06-02 ENCOUNTER — OFFICE VISIT (OUTPATIENT)
Dept: CARDIOLOGY | Facility: CLINIC | Age: 68
End: 2023-06-02

## 2023-06-02 VITALS
BODY MASS INDEX: 30.49 KG/M2 | SYSTOLIC BLOOD PRESSURE: 132 MMHG | OXYGEN SATURATION: 100 % | HEIGHT: 70 IN | WEIGHT: 213 LBS | HEART RATE: 60 BPM | DIASTOLIC BLOOD PRESSURE: 82 MMHG

## 2023-06-02 DIAGNOSIS — I42.0 CARDIOMYOPATHY, DILATED: Primary | ICD-10-CM

## 2023-06-02 DIAGNOSIS — I34.0 NON-RHEUMATIC MITRAL REGURGITATION: ICD-10-CM

## 2023-06-02 DIAGNOSIS — I50.22 CHRONIC SYSTOLIC CONGESTIVE HEART FAILURE: ICD-10-CM

## 2023-06-02 DIAGNOSIS — I10 ESSENTIAL HYPERTENSION: ICD-10-CM

## 2023-06-02 DIAGNOSIS — I44.7 LBBB (LEFT BUNDLE BRANCH BLOCK): ICD-10-CM

## 2023-06-02 NOTE — LETTER
June 2, 2023       No Recipients    Patient: Devaughn Valdez   YOB: 1955   Date of Visit: 6/2/2023       Dear Dr. Anne Recipients:    Thank you for referring Devaughn Valdez to me for evaluation. Below are the relevant portions of my assessment and plan of care.    If you have questions, please do not hesitate to call me. I look forward to following Devaughn along with you.         Sincerely,        Mauro Valencia MD        CC:   No Recipients    Mauro Valencia MD  06/02/23 0904  Signed  CC--- cardiomyopathy with left bundle branch block and coronary artery disease    Subject    68-year-old male patient was seen few months ago with ischemic cardiomyopathy and left bundle branch block.  Patient EF was as low as 20% and improved to 40% with medical management.  Patient had moderate to severe MR and had a PCI to LAD with placement of drug-eluting stent and PCI to mid RCA with placement of drug-eluting stent back in 2019.  He has been medically treated and EF improved to 40% and has history of hypertension hyperlipidemia nonsustained VT in the past with moderate MR and moderate AI.  Patient is well compensated with class I to class II and comes in for follow-up          Past Medical History:   Diagnosis Date   • Aortic insufficiency     Moderate   • Atrial fibrillation ([facility]) august 2019   • CAD (coronary artery disease)    • Cardiomyopathy ([facility])     e.f 20%   • CHF (congestive heart failure) ([facility]) August 2019   • Hyperlipidemia    • Hypertension    • Left atrial enlargement      Past Surgical History:   Procedure Laterality Date   • CARDIAC CATHETERIZATION N/A 09/13/2019    Procedure: Left Heart Cath and right heart cath;  Surgeon: Griselda Kowalski MD;  Location: [facility] CATH INVASIVE LOCATION;  Service: Cardiovascular   • CARDIAC CATHETERIZATION N/A 09/16/2019    Procedure: PCI to LAD;  Surgeon: Griselda Kowalski MD;  Location:  [facility] CATH INVASIVE LOCATION;  Service: Cardiovascular   •  CARDIAC ELECTROPHYSIOLOGY PROCEDURE  09/16/2019    Procedure: Impella Insertion;  Surgeon: Griselda Kowalski MD;  Location: [facility] CATH INVASIVE LOCATION;  Service: Cardiovascular   • CARDIAC ELECTROPHYSIOLOGY PROCEDURE N/A 09/16/2019    Procedure: Impella Removal;  Surgeon: Griselda Kowalski MD;  Location: [facility] CATH INVASIVE LOCATION;  Service: Cardiovascular   • CORONARY ANGIOPLASTY WITH STENT PLACEMENT      RCA and LAD   • TX RT/LT HEART CATHETERS N/A 09/16/2019    Procedure: Percutaneous Coronary Intervention;  Surgeon: Griselda Kowalski MD;  Location: [facility] CATH INVASIVE LOCATION;  Service: Cardiovascular       Physical Exam    General:      well developed, well nourished, in no acute distress.    Head:      normocephalic and atraumatic.    Eyes:      PERRL/EOM intact, conjunctivae and sclerae clear without nystagmus.    Neck:      no  thyromegaly, trachea central with normal respiratory effort  Lungs:      clear bilaterally to auscultation.    Heart:       regular rate and rhythm, S1, S2 without murmurs, rubs, or gallops--positive for paradoxical S2  Skin:      intact without lesions or rashes.    Psych:      alert and cooperative; normal mood and affect; normal attention span and concentration.          Assessment and plan    Chronic ischemic cardiomyopathy with EF of 40%  Left bundle branch block  Systolic heart failure chronic class I to class II  Essential hypertension well-controlled  Hyper lipidemia treated on statins  Coronary artery disease stable without angina  Most recent potassium of 4.6 and creatinine of 1.0  Hypertension much better optimized after increasing the dose of losartan last visit    Medications reviewed and follow-up appointments made    Cardiac MRI was ordered with last visit--still pending        ECG 12 Lead    Date/Time: 6/2/2023 9:01 AM  Performed by: Mauro Valencia MD  Authorized by: Mauro Valencia MD   Comparison: compared with previous ECG    Similar to previous ECG  Rhythm: sinus rhythm  Rate: normal  Conduction: left bundle branch block          Electronically signed by Mauro Valencia MD, 06/02/23, 9:01 AM EDT.

## 2023-06-02 NOTE — PROGRESS NOTES
CC--- cardiomyopathy with left bundle branch block and coronary artery disease    Subject    68-year-old male patient was seen few months ago with ischemic cardiomyopathy and left bundle branch block.  Patient EF was as low as 20% and improved to 40% with medical management.  Patient had moderate to severe MR and had a PCI to LAD with placement of drug-eluting stent and PCI to mid RCA with placement of drug-eluting stent back in 2019.  He has been medically treated and EF improved to 40% and has history of hypertension hyperlipidemia nonsustained VT in the past with moderate MR and moderate AI.  Patient is well compensated with class I to class II and comes in for follow-up          Past Medical History:   Diagnosis Date   • Aortic insufficiency     Moderate   • Atrial fibrillation ([facility]) august 2019   • CAD (coronary artery disease)    • Cardiomyopathy ([facility])     e.f 20%   • CHF (congestive heart failure) (formerly [facility]) August 2019   • Hyperlipidemia    • Hypertension    • Left atrial enlargement      Past Surgical History:   Procedure Laterality Date   • CARDIAC CATHETERIZATION N/A 09/13/2019    Procedure: Left Heart Cath and right heart cath;  Surgeon: Griselda Kowalski MD;  Location: [facility] CATH INVASIVE LOCATION;  Service: Cardiovascular   • CARDIAC CATHETERIZATION N/A 09/16/2019    Procedure: PCI to LAD;  Surgeon: Griselda Kowalski MD;  Location: [facility] CATH INVASIVE LOCATION;  Service: Cardiovascular   • CARDIAC ELECTROPHYSIOLOGY PROCEDURE  09/16/2019    Procedure: Impella Insertion;  Surgeon: Griselda Kowalski MD;  Location: [facility] CATH INVASIVE LOCATION;  Service: Cardiovascular   • CARDIAC ELECTROPHYSIOLOGY PROCEDURE N/A 09/16/2019    Procedure: Impella Removal;  Surgeon: Griselda Kowalski MD;  Location: [facility] CATH INVASIVE LOCATION;  Service: Cardiovascular   • CORONARY ANGIOPLASTY WITH STENT PLACEMENT      RCA and LAD   • MD RT/LT HEART CATHETERS N/A 09/16/2019    Procedure: Percutaneous  Coronary Intervention;  Surgeon: Griselda Kowalski MD;  Location: [facility] CATH INVASIVE LOCATION;  Service: Cardiovascular       Physical Exam    General:      well developed, well nourished, in no acute distress.    Head:      normocephalic and atraumatic.    Eyes:      PERRL/EOM intact, conjunctivae and sclerae clear without nystagmus.    Neck:      no  thyromegaly, trachea central with normal respiratory effort  Lungs:      clear bilaterally to auscultation.    Heart:       regular rate and rhythm, S1, S2 without murmurs, rubs, or gallops--positive for paradoxical S2  Skin:      intact without lesions or rashes.    Psych:      alert and cooperative; normal mood and affect; normal attention span and concentration.          Assessment and plan    Chronic ischemic cardiomyopathy with EF of 40%  Left bundle branch block  Systolic heart failure chronic class I to class II  Essential hypertension well-controlled  Hyper lipidemia treated on statins  Coronary artery disease stable without angina  Most recent potassium of 4.6 and creatinine of 1.0  Hypertension much better optimized after increasing the dose of losartan last visit    Medications reviewed and follow-up appointments made    Cardiac MRI was ordered with last visit--still pending        ECG 12 Lead    Date/Time: 6/2/2023 9:01 AM  Performed by: Mauro Valencia MD  Authorized by: Mauro Valencia MD   Comparison: compared with previous ECG   Similar to previous ECG  Rhythm: sinus rhythm  Rate: normal  Conduction: left bundle branch block          Electronically signed by Mauro Valencia MD, 06/02/23, 9:01 AM EDT.

## 2023-06-07 ENCOUNTER — HOSPITAL ENCOUNTER (OUTPATIENT)
Dept: MRI IMAGING | Facility: HOSPITAL | Age: 68
Discharge: HOME | End: 2023-06-07
Admitting: INTERNAL MEDICINE
Payer: COMMERCIAL

## 2023-06-07 PROCEDURE — 82565 ASSAY OF CREATININE: CPT

## 2023-06-07 PROCEDURE — 75561 CARDIAC MRI FOR MORPH W/DYE: CPT | Performed by: INTERNAL MEDICINE

## 2023-06-07 PROCEDURE — A9577 INJ MULTIHANCE: HCPCS | Performed by: INTERNAL MEDICINE

## 2023-06-07 PROCEDURE — 0 GADOBENATE DIMEGLUMINE 529 MG/ML SOLUTION: Performed by: INTERNAL MEDICINE

## 2023-06-07 PROCEDURE — 75561 CARDIAC MRI FOR MORPH W/DYE: CPT

## 2023-06-07 RX ADMIN — GADOBENATE DIMEGLUMINE 20 ML: 529 INJECTION, SOLUTION INTRAVENOUS at 10:29

## 2023-06-08 LAB — CREAT BLDA-MCNC: 1.2 MG/DL (ref 0.6–1.3)

## 2023-09-05 RX ORDER — LOSARTAN POTASSIUM 100 MG/1
TABLET ORAL
Qty: 90 TABLET | Refills: 0 | Status: SHIPPED | OUTPATIENT
Start: 2023-09-05

## 2023-09-05 RX ORDER — CLOPIDOGREL BISULFATE 75 MG/1
TABLET ORAL
Qty: 90 TABLET | Refills: 0 | Status: SHIPPED | OUTPATIENT
Start: 2023-09-05

## 2023-09-05 RX ORDER — ATORVASTATIN CALCIUM 40 MG/1
TABLET, FILM COATED ORAL
Qty: 90 TABLET | Refills: 0 | Status: SHIPPED | OUTPATIENT
Start: 2023-09-05

## 2023-10-02 RX ORDER — EMPAGLIFLOZIN 10 MG/1
TABLET, FILM COATED ORAL
Qty: 90 TABLET | Refills: 0 | Status: SHIPPED | OUTPATIENT
Start: 2023-10-02

## 2023-12-04 ENCOUNTER — OFFICE VISIT (OUTPATIENT)
Dept: CARDIOLOGY | Facility: CLINIC | Age: 68
End: 2023-12-04
Payer: COMMERCIAL

## 2023-12-04 VITALS
HEIGHT: 70 IN | HEART RATE: 67 BPM | BODY MASS INDEX: 31.07 KG/M2 | DIASTOLIC BLOOD PRESSURE: 69 MMHG | WEIGHT: 217 LBS | OXYGEN SATURATION: 98 % | SYSTOLIC BLOOD PRESSURE: 150 MMHG

## 2023-12-04 DIAGNOSIS — I34.0 NON-RHEUMATIC MITRAL REGURGITATION: ICD-10-CM

## 2023-12-04 DIAGNOSIS — I25.119 CORONARY ARTERY DISEASE INVOLVING NATIVE CORONARY ARTERY OF NATIVE HEART WITH ANGINA PECTORIS: ICD-10-CM

## 2023-12-04 DIAGNOSIS — I42.0 CARDIOMYOPATHY, DILATED: Primary | ICD-10-CM

## 2023-12-04 DIAGNOSIS — E78.2 MIXED HYPERLIPIDEMIA: ICD-10-CM

## 2023-12-04 DIAGNOSIS — I10 ESSENTIAL HYPERTENSION: ICD-10-CM

## 2023-12-04 PROCEDURE — 93000 ELECTROCARDIOGRAM COMPLETE: CPT | Performed by: INTERNAL MEDICINE

## 2023-12-04 PROCEDURE — 99214 OFFICE O/P EST MOD 30 MIN: CPT | Performed by: INTERNAL MEDICINE

## 2023-12-04 RX ORDER — NITROGLYCERIN 0.4 MG/1
TABLET SUBLINGUAL
Qty: 30 TABLET | Refills: 2 | Status: SHIPPED | OUTPATIENT
Start: 2023-12-04

## 2023-12-06 RX ORDER — LOSARTAN POTASSIUM 100 MG/1
TABLET ORAL
Qty: 90 TABLET | Refills: 0 | Status: SHIPPED | OUTPATIENT
Start: 2023-12-06

## 2023-12-06 RX ORDER — SPIRONOLACTONE 25 MG/1
12.5 TABLET ORAL DAILY
Qty: 90 TABLET | Refills: 0 | Status: SHIPPED | OUTPATIENT
Start: 2023-12-06

## 2023-12-18 RX ORDER — METOPROLOL SUCCINATE 25 MG/1
25 TABLET, EXTENDED RELEASE ORAL DAILY
Qty: 90 TABLET | Refills: 2 | Status: SHIPPED | OUTPATIENT
Start: 2023-12-18

## 2024-01-08 RX ORDER — ATORVASTATIN CALCIUM 40 MG/1
40 TABLET, FILM COATED ORAL
Qty: 30 TABLET | Refills: 0 | Status: SHIPPED | OUTPATIENT
Start: 2024-01-08

## 2024-01-08 RX ORDER — CLOPIDOGREL BISULFATE 75 MG/1
75 TABLET ORAL DAILY
Qty: 30 TABLET | Refills: 0 | Status: SHIPPED | OUTPATIENT
Start: 2024-01-08

## 2024-01-18 RX ORDER — ATORVASTATIN CALCIUM 40 MG/1
40 TABLET, FILM COATED ORAL
Qty: 30 TABLET | Refills: 0 | Status: SHIPPED | OUTPATIENT
Start: 2024-01-18

## 2024-01-18 RX ORDER — CLOPIDOGREL BISULFATE 75 MG/1
75 TABLET ORAL DAILY
Qty: 30 TABLET | Refills: 0 | Status: SHIPPED | OUTPATIENT
Start: 2024-01-18

## 2024-01-18 RX ORDER — LOSARTAN POTASSIUM 100 MG/1
TABLET ORAL
Qty: 90 TABLET | Refills: 0 | Status: SHIPPED | OUTPATIENT
Start: 2024-01-18

## 2024-01-18 RX ORDER — EMPAGLIFLOZIN 10 MG/1
10 TABLET, FILM COATED ORAL DAILY
Qty: 30 TABLET | Refills: 0 | Status: SHIPPED | OUTPATIENT
Start: 2024-01-18

## 2024-01-18 RX ORDER — SPIRONOLACTONE 25 MG/1
12.5 TABLET ORAL DAILY
Qty: 90 TABLET | Refills: 0 | Status: SHIPPED | OUTPATIENT
Start: 2024-01-18

## 2024-02-19 RX ORDER — ATORVASTATIN CALCIUM 40 MG/1
40 TABLET, FILM COATED ORAL
Qty: 30 TABLET | Refills: 0 | Status: SHIPPED | OUTPATIENT
Start: 2024-02-19

## 2024-02-19 RX ORDER — CLOPIDOGREL BISULFATE 75 MG/1
75 TABLET ORAL DAILY
Qty: 30 TABLET | Refills: 0 | Status: SHIPPED | OUTPATIENT
Start: 2024-02-19

## 2024-02-19 NOTE — TELEPHONE ENCOUNTER
Caller: Devaughn Valdez    Relationship: Self    Best call back number: [phone]    Requested Prescriptions:   Requested Prescriptions     Pending Prescriptions Disp Refills    empagliflozin (Jardiance) 10 MG tablet tablet 30 tablet 0     Sig: Take 1 tablet by mouth Daily.        Pharmacy where request should be sent: [id number] [address] - [phone]  - [phone] FX     Last office visit with prescribing clinician: 9/20/2022   Last telemedicine visit with prescribing clinician: Visit date not found   Next office visit with prescribing clinician: 3/6/2024     Additional details provided by patient:     PT WOULD LIKE  GENERIC OF THIS IF POSSIBLE. JARDIANCE IS TO EXPENSIVE    Does the patient have less than a 3 day supply:  [x] Yes  [] No    Would you like a call back once the refill request has been completed: [] Yes [x] No    If the office needs to give you a call back, can they leave a voicemail: [x] Yes [] No    Lisa Anglin Rep   02/19/24 08:59 EST

## 2024-03-06 ENCOUNTER — OFFICE VISIT (OUTPATIENT)
Dept: CARDIOLOGY | Facility: CLINIC | Age: 69
End: 2024-03-06
Payer: COMMERCIAL

## 2024-03-06 VITALS
DIASTOLIC BLOOD PRESSURE: 96 MMHG | BODY MASS INDEX: 30.92 KG/M2 | HEART RATE: 60 BPM | WEIGHT: 216 LBS | HEIGHT: 70 IN | SYSTOLIC BLOOD PRESSURE: 171 MMHG | OXYGEN SATURATION: 99 %

## 2024-03-06 DIAGNOSIS — I42.0 CARDIOMYOPATHY, DILATED: ICD-10-CM

## 2024-03-06 DIAGNOSIS — I10 ESSENTIAL HYPERTENSION: ICD-10-CM

## 2024-03-06 DIAGNOSIS — I34.0 NON-RHEUMATIC MITRAL REGURGITATION: ICD-10-CM

## 2024-03-06 DIAGNOSIS — I25.119 CORONARY ARTERY DISEASE INVOLVING NATIVE CORONARY ARTERY OF NATIVE HEART WITH ANGINA PECTORIS: ICD-10-CM

## 2024-03-06 DIAGNOSIS — I42.0 DILATED CARDIOMYOPATHY: ICD-10-CM

## 2024-03-06 DIAGNOSIS — I35.1 NONRHEUMATIC AORTIC VALVE INSUFFICIENCY: ICD-10-CM

## 2024-03-06 DIAGNOSIS — I50.22 CHRONIC SYSTOLIC CONGESTIVE HEART FAILURE: ICD-10-CM

## 2024-03-06 DIAGNOSIS — E78.5 HYPERLIPIDEMIA LDL GOAL <100: Primary | ICD-10-CM

## 2024-03-06 RX ORDER — SPIRONOLACTONE 25 MG/1
25 TABLET ORAL DAILY
Qty: 90 TABLET | Refills: 3 | Status: SHIPPED | OUTPATIENT
Start: 2024-03-06

## 2024-03-06 NOTE — PROGRESS NOTES
Cardiology Office Visit      Encounter Date:  03/06/2024    Patient ID:   Devaughn Valdez is a 68 y.o. male.      Reason For Followup:  Cardiomyopathy  Coronary artery disease status post PCI and stenting  Hypertension  Hyperlipidemia    Brief Clinical History:  Dear Zev Polk MD    I had the pleasure of seeing Devaughn Valdez today. As you are well aware, this is a 68 y.o. male  that was recently diagnosed with hypertension and also cardiomegaly and severe cardiomyopathy  Patient has severe cardiomyopathy on echocardiogram with LV ejection fraction of 20%  Moderate to severe mitral regurgitation    PROCEDURE PERFORMED: September 2019  Successful PCI mid LAD with placement of a drug-eluting stent  Successful PCI of the mid right coronary artery with placement of a drug-eluting stent    Interpretation Summary     The left ventricular cavity is mild-to-moderately dilated.  Left ventricular wall thickness is consistent with mild concentric hypertrophy.  Estimated EF = 35%.           Interval History:  Patient denies any symptoms of chest pain shortness of breath dizziness or syncope  Denies any new complaints  Assessment & Plan    Impressions:  Severe cardiomyopathy with improvement of the LV systolic function and ejection fraction from 20% to 35%/most recent echocardiogram with LV ejection fraction of 40%/most recent cardiac MRI with LV ejection fraction of 50%  Recovery of LV systolic function with most recent cardiac MRI showing LV ejection fraction of 50% and no significant valve pathology  Coronary artery disease status post PCI and stenting  Hypertension  Hyperlipidemia  Moderate mitral regurgitation/resolved  Moderate aortic regurgitation/resolved  Impaired relaxation  Cardiac MRI with LV ejection fraction of 52%    Recommendations:  Patient unable to afford Entresto secondary to co-pay issues  Continue losartan 100 mg p.o. once a day  Jardiance 10 mg p.o. once a day  Continue Toprol-XL 25 mg p.o. once a  "day  Continue dual antiplatelet with aspirin and Plavix  Increase the dose of spironolactone from 12.5 mg p.o. once a day to 25 mg p.o. once a day  Continue statin beta-blocker and spironolactone  Continue dual antiplatelet therapy with aspirin and Plavix  Check labs including CBC CMP and lipids  Continue aspirin statin Plavix beta-blocker for coronary artery disease  Significant improvement in the LV systolic function from the time of her diagnosis of cardiomyopathy  Coronary artery disease status post PCI  Continue current medical therapy with aspirin 81 mg p.o. once a day Plavix 75 mg p.o. once a day Lipitor 40 mg p.o. once a day Jardiance 10 mg p.o. once a day losartan 50 mg p.o. once a day Toprol-XL 25 mg p.o. once a day Aldactone 25 mg p.o. once a day  Continue close monitoring  Patient is advised to find a primary care physician  Follow-up in office in 6 months        Vitals:  Vitals:    03/06/24 0828   BP: 171/96   Pulse: 60   SpO2: 99%   Weight: 98 kg (216 lb)   Height: 177.8 cm (70\")       Physical Exam:    General: Alert, cooperative, no distress, appears stated age  Head:  Normocephalic, atraumatic, mucous membranes moist  Eyes:  Conjunctiva/corneas clear, EOM's intact     Neck:  Supple,  no adenopathy;      Lungs: Clear to auscultation bilaterally, no wheezes rhonchi rales are noted  Chest wall: No tenderness  Heart::  Regular rate and rhythm, S1 and S2 normal, no murmur, rub or gallop  Abdomen: Soft, non-tender, nondistended bowel sounds active  Extremities: No cyanosis, clubbing, or edema  Pulses: 2+ and symmetric all extremities  Skin:  No rashes or lesions  Neuro/psych: A&O x3. CN II through XII are grossly intact with appropriate affect              Lab Results   Component Value Date    GLUCOSE 89 03/17/2023    BUN 22 03/17/2023    CREATININE 1.20 06/07/2023    EGFR 83.5 03/17/2023    BCR 22.0 03/17/2023    K 4.6 03/17/2023    CO2 27.7 03/17/2023    CALCIUM 10.0 03/17/2023    PROTENTOTREF 6.4 " 09/13/2019    ALBUMIN 4.40 10/29/2020    BILITOT 0.6 10/29/2020    AST 21 10/29/2020    ALT 19 10/29/2020     Results for orders placed during the hospital encounter of 09/13/22    Adult Transthoracic Echo Complete w/ Color, Spectral and Contrast if necessary per protocol    Interpretation Summary  · The left ventricular cavity is mildly dilated.  · Left ventricular wall thickness is consistent with moderate concentric hypertrophy.  · Estimated left ventricular EF = 40% Estimated left ventricular EF was in agreement with the calculated left ventricular EF. Left ventricular systolic function is mildly decreased.  · The right ventricular cavity is mildly dilated.  · Mildly reduced right ventricular systolic function noted.  · Moderate mitral valve regurgitation is present.  · Mild dilation of the aortic root is present.  · Estimated right ventricular systolic pressure from tricuspid regurgitation is normal (<35 mmHg).  · Moderate aortic valve regurgitation is present.  · Left ventricular diastolic function is consistent with (grade I) impaired relaxation.     Results for orders placed during the hospital encounter of 09/13/19    Cardiac Catheterization/Vascular Study    Narrative  Percutaneous coronary intervention    DATE OF PROCEDURE: 9/16/2019    INDICATION FOR PROCEDURE:  Cardiomyopathy  Severe LV dysfunction  Two-vessel coronary artery disease  Renal failure    PROCEDURE PERFORMED:  Successful PCI mid LAD with placement of a drug-eluting stent  Successful PCI of the mid right coronary artery with placement of a drug-eluting stent  Successful insertion of the Impella left ventricular percutaneous ventricular assist device  Successful removal of the Impella left ventricular percutaneous ventricular assist device  Hemostasis of the left common femoral artery with a Perclose closure device    PROCEDURE COMMENTS:    Informed consent was obtained from the patient after explaining risks and benefits.  Patient was  brought to the cardiac interventional artery and placed on the table in the usual fashion.  Right groin was prepped and draped in usual sterile fashion 2% lidocaine was used for localization right femoral artery was accessed using a modified 7 technique with micropuncture needle with a 6 Jamaican arterial sheath.  Left femoral artery was accessed using micropuncture and placed 2 Perclose closure devices before the start of the procedure and a 14 Jamaican Impella sheath was placed after dilating the tract.  We inserted an Impella CP left ventricular percutaneous ventricular assist device under fluoroscopic guidance with a good waveforms and good cardiac output before the start of the procedure.  And this device was removed at the end of the procedure and hemostasis was achieved using 2 Perclose closure devices with good hemostasis.  For the interventional procedure on the mid LAD lesion we used a XB 3.5 guide catheter with a whisper guidewire to cross the lesion in the midportion of the LAD and the lesion is directly stented with a 3.0 x 18 mm Xience drug-eluting stent that was deployed at 14 krysta with good angiographic results.  There was a FERCHO-3 flow in the diagonal branch with no significant compromise on the flow after the interventional procedure.  For the interventional procedure on the right coronary artery we used a centimeters whisper guidewire with a JR4 guide catheter and the lesion is directly stented with a 3.5 x 18 mm Xience drug-eluting stent that was deployed at 18 krysta with good angiographic results.  Patient tolerated procedure well with no immediate postop completion plan to obtain hemostasis by manual pressure on the right side.    FINDINGS:      1. CORONARY ANGIOGRAPHY:    LAD: Mid 90% stenosis diagonal bifurcation  FERCHO-3 flow before and after the intervention in the LAD and also in the diagonal branch  Lesion length is 10 mm  Stented with a 3.0 x 18 mm Xience drug-eluting stent  Percent stenosis  before the procedure 90%  Percent stenosis after the procedure 0%  FERCHO-3 flow before and after the procedure  No complications during or immediately after the procedure    RCA: 70% stenosis in the mid right coronary artery  FERCHO-3 flow before and after the intervention  Lesion length is 15 mm  Stented with a 3.5 x 18 mm Xience drug-eluting stent  Percent stenosis after the procedure 0%  No complications during or immediately after the procedure  Coronary dominance: Right coronary artery    SUMMARY:  Successful PCI mid LAD with placement of a drug-eluting stent  Successful PCI of the mid right coronary artery with placement of a drug-eluting stent  Successful insertion of the Impella left ventricular percutaneous ventricular assist device  Successful removal of the Impella left ventricular percutaneous ventricular assist device  Hemostasis of the left common femoral artery with a Perclose closure device    RECOMMENDATIONS:  Aspirin 81 mg p.o. once a day  Plavix 75 mg p.o. once a day  Observe patient in CVCU bed overnight  Continued aggressive risk factor modification  Medical therapy for cardiomyopathy  Test results discussed with the patient and family     Lab Results   Component Value Date    CHOL 113 09/12/2022    TRIG 156 (H) 09/12/2022    HDL 40 09/12/2022    LDL 47 09/12/2022       Results for orders placed during the hospital encounter of 12/01/22    Mobile Cardiac Outpatient Telemetry    Interpretation Summary  Extended Holter monitor study  Patient was monitored from December 1, 2022 to December 30, 2022  Underlying rhythm is sinus rhythm with a minimal heart rate of 37 bpm maximal heart rate of 120 bpm average heart rate of 64 bpm  No atrial fibrillation  No sustained ventricular or supraventricular tachyarrhythmia  First-degree AV block  PVC burden of 1%  PAC burden of less than 1%  Minimal heart rate of 37 bpm was sinus bradycardia with first-degree AV block and intraventricular conduction  delay  Clinical correlation is recommended           Objective:          Allergies:  No Known Allergies    Medication Review:     Current Outpatient Medications:     aspirin (aspirin) 81 MG EC tablet, Take 1 tablet by mouth Daily., Disp: 30 tablet, Rfl: 0    atorvastatin (LIPITOR) 40 MG tablet, Take 1 tablet by mouth every night at bedtime. Please call for an appt with Dr Kowalski, Disp: 30 tablet, Rfl: 0    clopidogrel (PLAVIX) 75 MG tablet, TAKE 1 TABLET BY MOUTH EVERY DAY, Disp: 30 tablet, Rfl: 0    empagliflozin (Jardiance) 10 MG tablet tablet, Take 1 tablet by mouth Daily., Disp: 90 tablet, Rfl: 3    losartan (COZAAR) 100 MG tablet, TAKE 1 TABLET BY MOUTH EVERY DAY, Disp: 90 tablet, Rfl: 0    metoprolol succinate XL (TOPROL-XL) 25 MG 24 hr tablet, TAKE 1 TABLET BY MOUTH EVERY DAY, Disp: 90 tablet, Rfl: 2    nitroglycerin (NITROSTAT) 0.4 MG SL tablet, 1 under the tongue as needed for angina, may repeat q5mins for up three doses, Disp: 30 tablet, Rfl: 2    spironolactone (ALDACTONE) 25 MG tablet, Take 1 tablet by mouth Daily., Disp: 90 tablet, Rfl: 3    Family History:  Family History   Problem Relation Age of Onset    Cancer Mother     Heart attack Father        Past Medical History:  Past Medical History:   Diagnosis Date    Aortic insufficiency     Moderate    Atrial fibrillation august 2019    CAD (coronary artery disease)     Cardiomyopathy     e.f 20%    CHF (congestive heart failure) August 2019    Hyperlipidemia     Hypertension     Left atrial enlargement        Past surgical History:  Past Surgical History:   Procedure Laterality Date    CARDIAC CATHETERIZATION N/A 09/13/2019    Procedure: Left Heart Cath and right heart cath;  Surgeon: Griselda Kowalski MD;  Location: [facility] CATH INVASIVE LOCATION;  Service: Cardiovascular    CARDIAC CATHETERIZATION N/A 09/16/2019    Procedure: PCI to LAD;  Surgeon: Griselda Kowalski MD;  Location: [facility] CATH INVASIVE LOCATION;  Service: Cardiovascular     CARDIAC ELECTROPHYSIOLOGY PROCEDURE  09/16/2019    Procedure: Impella Insertion;  Surgeon: Griselda Kowalski MD;  Location: [facility] CATH INVASIVE LOCATION;  Service: Cardiovascular    CARDIAC ELECTROPHYSIOLOGY PROCEDURE N/A 09/16/2019    Procedure: Impella Removal;  Surgeon: Griselda Kowalski MD;  Location: [facility] CATH INVASIVE LOCATION;  Service: Cardiovascular    CORONARY ANGIOPLASTY WITH STENT PLACEMENT      RCA and LAD    MS RT/LT HEART CATHETERS N/A 09/16/2019    Procedure: Percutaneous Coronary Intervention;  Surgeon: Griselda Kowalski MD;  Location: [facility] CATH INVASIVE LOCATION;  Service: Cardiovascular       Social History:  Social History     Socioeconomic History    Marital status:    Tobacco Use    Smoking status: Never     Passive exposure: Never    Smokeless tobacco: Never   Vaping Use    Vaping status: Never Used   Substance and Sexual Activity    Alcohol use: No    Drug use: No    Sexual activity: Yes     Partners: Female       Review of Systems:  The following systems were reviewed as they relate to the cardiovascular system: Constitutional, Eyes, ENT, Cardiovascular, Respiratory, Gastrointestinal, Integumentary, Neurological, Psychiatric, Hematologic, Endocrine, Musculoskeletal, and Genitourinary. The pertinent cardiovascular findings are reported above with all other cardiovascular points within those systems being negative.    Diagnostic Study Review:     Current Electrocardiogram:  Procedures          NOTE: The following portions of the patient's history were reviewed and updated this visit as appropriate: allergies, current medications, past family history, past medical history, past social history, past surgical history and problem list.

## 2024-03-14 RX ORDER — LOSARTAN POTASSIUM 100 MG/1
TABLET ORAL
Qty: 90 TABLET | Refills: 1 | Status: SHIPPED | OUTPATIENT
Start: 2024-03-14

## 2024-03-18 RX ORDER — CLOPIDOGREL BISULFATE 75 MG/1
75 TABLET ORAL DAILY
Qty: 30 TABLET | Refills: 0 | Status: SHIPPED | OUTPATIENT
Start: 2024-03-18

## 2024-03-18 RX ORDER — ATORVASTATIN CALCIUM 40 MG/1
40 TABLET, FILM COATED ORAL
Qty: 30 TABLET | Refills: 0 | Status: SHIPPED | OUTPATIENT
Start: 2024-03-18

## 2024-03-29 RX ORDER — CLOPIDOGREL BISULFATE 75 MG/1
75 TABLET ORAL DAILY
Qty: 30 TABLET | Refills: 0 | Status: SHIPPED | OUTPATIENT
Start: 2024-03-29

## 2024-03-29 RX ORDER — ATORVASTATIN CALCIUM 40 MG/1
40 TABLET, FILM COATED ORAL
Qty: 30 TABLET | Refills: 0 | Status: SHIPPED | OUTPATIENT
Start: 2024-03-29

## 2024-05-22 RX ORDER — ATORVASTATIN CALCIUM 40 MG/1
40 TABLET, FILM COATED ORAL
Qty: 90 TABLET | Refills: 0 | Status: SHIPPED | OUTPATIENT
Start: 2024-05-22

## 2024-05-22 RX ORDER — CLOPIDOGREL BISULFATE 75 MG/1
75 TABLET ORAL DAILY
Qty: 90 TABLET | Refills: 0 | Status: SHIPPED | OUTPATIENT
Start: 2024-05-22

## 2024-08-19 RX ORDER — CLOPIDOGREL BISULFATE 75 MG/1
75 TABLET ORAL DAILY
Qty: 90 TABLET | Refills: 2 | Status: SHIPPED | OUTPATIENT
Start: 2024-08-19

## 2024-08-19 RX ORDER — ATORVASTATIN CALCIUM 40 MG/1
40 TABLET, FILM COATED ORAL
Qty: 90 TABLET | Refills: 2 | Status: SHIPPED | OUTPATIENT
Start: 2024-08-19

## 2024-08-30 ENCOUNTER — LAB (OUTPATIENT)
Dept: LAB | Facility: HOSPITAL | Age: 69
End: 2024-08-30
Payer: COMMERCIAL

## 2024-08-30 DIAGNOSIS — I34.0 NON-RHEUMATIC MITRAL REGURGITATION: ICD-10-CM

## 2024-08-30 DIAGNOSIS — E78.5 HYPERLIPIDEMIA LDL GOAL <100: ICD-10-CM

## 2024-08-30 DIAGNOSIS — I42.0 CARDIOMYOPATHY, DILATED: ICD-10-CM

## 2024-08-30 DIAGNOSIS — I25.119 CORONARY ARTERY DISEASE INVOLVING NATIVE CORONARY ARTERY OF NATIVE HEART WITH ANGINA PECTORIS: ICD-10-CM

## 2024-08-30 DIAGNOSIS — I10 ESSENTIAL HYPERTENSION: ICD-10-CM

## 2024-08-30 DIAGNOSIS — I50.22 CHRONIC SYSTOLIC CONGESTIVE HEART FAILURE: ICD-10-CM

## 2024-08-30 LAB
ALBUMIN SERPL-MCNC: 4.1 G/DL (ref 3.5–5.2)
ALBUMIN/GLOB SERPL: 1.4 G/DL
ALP SERPL-CCNC: 80 U/L (ref 39–117)
ALT SERPL W P-5'-P-CCNC: 19 U/L (ref 1–41)
ANION GAP SERPL CALCULATED.3IONS-SCNC: 9.5 MMOL/L (ref 5–15)
ANISOCYTOSIS BLD QL: NORMAL
AST SERPL-CCNC: 21 U/L (ref 1–40)
BASOPHILS # BLD AUTO: 0.03 10*3/MM3 (ref 0–0.2)
BASOPHILS NFR BLD AUTO: 0.4 % (ref 0–1.5)
BILIRUB SERPL-MCNC: 0.8 MG/DL (ref 0–1.2)
BUN SERPL-MCNC: 16 MG/DL (ref 8–23)
BUN/CREAT SERPL: 14.4 (ref 7–25)
CALCIUM SPEC-SCNC: 9.4 MG/DL (ref 8.6–10.5)
CHLORIDE SERPL-SCNC: 107 MMOL/L (ref 98–107)
CHOLEST SERPL-MCNC: 114 MG/DL (ref 0–200)
CO2 SERPL-SCNC: 25.5 MMOL/L (ref 22–29)
CREAT SERPL-MCNC: 1.11 MG/DL (ref 0.76–1.27)
DEPRECATED RDW RBC AUTO: 54.4 FL (ref 37–54)
EGFRCR SERPLBLD CKD-EPI 2021: 72.8 ML/MIN/1.73
EOSINOPHIL # BLD AUTO: 0.15 10*3/MM3 (ref 0–0.4)
EOSINOPHIL NFR BLD AUTO: 2.2 % (ref 0.3–6.2)
ERYTHROCYTE [DISTWIDTH] IN BLOOD BY AUTOMATED COUNT: 19.5 % (ref 12.3–15.4)
GLOBULIN UR ELPH-MCNC: 3 GM/DL
GLUCOSE SERPL-MCNC: 87 MG/DL (ref 65–99)
HCT VFR BLD AUTO: 41.9 % (ref 37.5–51)
HDLC SERPL-MCNC: 39 MG/DL (ref 40–60)
HGB BLD-MCNC: 12.3 G/DL (ref 13–17.7)
IMM GRANULOCYTES # BLD AUTO: 0.01 10*3/MM3 (ref 0–0.05)
IMM GRANULOCYTES NFR BLD AUTO: 0.1 % (ref 0–0.5)
LARGE PLATELETS: NORMAL
LDLC SERPL CALC-MCNC: 54 MG/DL (ref 0–100)
LDLC/HDLC SERPL: 1.32 {RATIO}
LYMPHOCYTES # BLD AUTO: 2.15 10*3/MM3 (ref 0.7–3.1)
LYMPHOCYTES NFR BLD AUTO: 32.2 % (ref 19.6–45.3)
MCH RBC QN AUTO: 23.1 PG (ref 26.6–33)
MCHC RBC AUTO-ENTMCNC: 29.4 G/DL (ref 31.5–35.7)
MCV RBC AUTO: 78.8 FL (ref 79–97)
MONOCYTES # BLD AUTO: 0.61 10*3/MM3 (ref 0.1–0.9)
MONOCYTES NFR BLD AUTO: 9.1 % (ref 5–12)
NEUTROPHILS NFR BLD AUTO: 3.72 10*3/MM3 (ref 1.7–7)
NEUTROPHILS NFR BLD AUTO: 56 % (ref 42.7–76)
NRBC BLD AUTO-RTO: 0 /100 WBC (ref 0–0.2)
PLATELET # BLD AUTO: 153 10*3/MM3 (ref 140–450)
PMV BLD AUTO: ABNORMAL FL
POTASSIUM SERPL-SCNC: 4.4 MMOL/L (ref 3.5–5.2)
PROT SERPL-MCNC: 7.1 G/DL (ref 6–8.5)
RBC # BLD AUTO: 5.32 10*6/MM3 (ref 4.14–5.8)
SODIUM SERPL-SCNC: 142 MMOL/L (ref 136–145)
TRIGL SERPL-MCNC: 118 MG/DL (ref 0–150)
TSH SERPL DL<=0.05 MIU/L-ACNC: 2.17 UIU/ML (ref 0.27–4.2)
VLDLC SERPL-MCNC: 21 MG/DL (ref 5–40)
WBC MORPH BLD: NORMAL
WBC NRBC COR # BLD AUTO: 6.67 10*3/MM3 (ref 3.4–10.8)

## 2024-08-30 PROCEDURE — 80061 LIPID PANEL: CPT

## 2024-08-30 PROCEDURE — 85007 BL SMEAR W/DIFF WBC COUNT: CPT

## 2024-08-30 PROCEDURE — 80050 GENERAL HEALTH PANEL: CPT

## 2024-09-04 ENCOUNTER — OFFICE VISIT (OUTPATIENT)
Dept: CARDIOLOGY | Facility: CLINIC | Age: 69
End: 2024-09-04
Payer: COMMERCIAL

## 2024-09-04 VITALS
DIASTOLIC BLOOD PRESSURE: 90 MMHG | HEART RATE: 60 BPM | BODY MASS INDEX: 29.92 KG/M2 | OXYGEN SATURATION: 97 % | WEIGHT: 209 LBS | SYSTOLIC BLOOD PRESSURE: 163 MMHG | HEIGHT: 70 IN

## 2024-09-04 DIAGNOSIS — I35.1 NONRHEUMATIC AORTIC VALVE INSUFFICIENCY: Primary | ICD-10-CM

## 2024-09-04 DIAGNOSIS — I34.0 NON-RHEUMATIC MITRAL REGURGITATION: ICD-10-CM

## 2024-09-04 DIAGNOSIS — I42.0 CARDIOMYOPATHY, DILATED: ICD-10-CM

## 2024-09-04 DIAGNOSIS — I25.119 CORONARY ARTERY DISEASE INVOLVING NATIVE CORONARY ARTERY OF NATIVE HEART WITH ANGINA PECTORIS: ICD-10-CM

## 2024-09-04 DIAGNOSIS — I42.0 DILATED CARDIOMYOPATHY: ICD-10-CM

## 2024-09-04 DIAGNOSIS — E78.2 MIXED HYPERLIPIDEMIA: ICD-10-CM

## 2024-09-04 PROCEDURE — 99214 OFFICE O/P EST MOD 30 MIN: CPT | Performed by: INTERNAL MEDICINE

## 2024-09-04 PROCEDURE — 93000 ELECTROCARDIOGRAM COMPLETE: CPT | Performed by: INTERNAL MEDICINE

## 2024-09-04 NOTE — PROGRESS NOTES
Cardiology Office Visit      Encounter Date:  09/04/2024    Patient ID:   Devaughn Valdez is a 69 y.o. male.    Reason For Followup:  Cardiomyopathy  Coronary artery disease status post PCI and stenting  Hypertension  Hyperlipidemia    Brief Clinical History:  Dear Zev Polk MD    I had the pleasure of seeing Devaughn Valdez today. As you are well aware, this is a 69 y.o. male  that was recently diagnosed with hypertension and also cardiomegaly and severe cardiomyopathy  Patient has severe cardiomyopathy on echocardiogram with LV ejection fraction of 20%  Moderate to severe mitral regurgitation  PROCEDURE PERFORMED: September 2019  Successful PCI mid LAD with placement of a drug-eluting stent  Successful PCI of the mid right coronary artery with placement of a drug-eluting stent        Interval History:  Patient denies any symptoms of chest pain shortness of breath dizziness or syncope  Denies any new complaints  Assessment & Plan    Impressions:  Severe cardiomyopathy with improvement of the LV systolic function and ejection fraction from 20% to 35%/most recent echocardiogram with LV ejection fraction of 40%/most recent cardiac MRI with LV ejection fraction of 50%  Recovery of LV systolic function with most recent cardiac MRI showing LV ejection fraction of 50% and no significant valve pathology  Coronary artery disease status post PCI and stenting  Hypertension  Hyperlipidemia  Moderate mitral regurgitation/resolved  Moderate aortic regurgitation/resolved  Cardiac MRI with LV ejection fraction of 52%  Whitecoat hypertension  Left bundle branch block    Recommendations:  Patient unable to afford Entresto secondary to co-pay issues  Continue statin beta-blocker and spironolactone  Continue dual antiplatelet therapy with aspirin and Plavix  Continue current medical therapy with aspirin 81 mg p.o. once a day Plavix 75 mg p.o. once a day Lipitor 40 mg p.o. once a day Jardiance 10 mg p.o. once a day losartan 50 mg  "p.o. once a day Toprol-XL 25 mg p.o. once a day Aldactone 25 mg p.o. once a day  Continue close monitoring  Patient is advised to find a primary care physician  Recent labs and workup reviewed and discussed with patient  Home blood pressure readings are optimal  Lipids and recent labs reviewed and discussed with patient and lipids are optimal  Follow-up in office in 6 months        Vitals:  Vitals:    09/04/24 0839   BP: 163/90   Pulse: 60   SpO2: 97%   Weight: 94.8 kg (209 lb)   Height: 177.8 cm (70\")       Physical Exam:    General: Alert, cooperative, no distress, appears stated age  Head:  Normocephalic, atraumatic, mucous membranes moist  Eyes:  Conjunctiva/corneas clear, EOM's intact     Neck:  Supple,  no adenopathy;      Lungs: Clear to auscultation bilaterally, no wheezes rhonchi rales are noted  Chest wall: No tenderness  Heart::  Regular rate and rhythm, S1 and S2 normal, no murmur, rub or gallop  Abdomen: Soft, non-tender, nondistended bowel sounds active  Extremities: No cyanosis, clubbing, or edema  Pulses: 2+ and symmetric all extremities  Skin:  No rashes or lesions  Neuro/psych: A&O x3. CN II through XII are grossly intact with appropriate affect              Lab Results   Component Value Date    GLUCOSE 87 08/30/2024    BUN 16 08/30/2024    CREATININE 1.11 08/30/2024    EGFR 72.8 08/30/2024    BCR 14.4 08/30/2024    K 4.4 08/30/2024    CO2 25.5 08/30/2024    CALCIUM 9.4 08/30/2024    PROTENTOTREF 6.4 09/13/2019    ALBUMIN 4.1 08/30/2024    BILITOT 0.8 08/30/2024    AST 21 08/30/2024    ALT 19 08/30/2024     Results for orders placed during the hospital encounter of 09/13/22    Adult Transthoracic Echo Complete w/ Color, Spectral and Contrast if necessary per protocol    Interpretation Summary  · The left ventricular cavity is mildly dilated.  · Left ventricular wall thickness is consistent with moderate concentric hypertrophy.  · Estimated left ventricular EF = 40% Estimated left ventricular EF was " in agreement with the calculated left ventricular EF. Left ventricular systolic function is mildly decreased.  · The right ventricular cavity is mildly dilated.  · Mildly reduced right ventricular systolic function noted.  · Moderate mitral valve regurgitation is present.  · Mild dilation of the aortic root is present.  · Estimated right ventricular systolic pressure from tricuspid regurgitation is normal (<35 mmHg).  · Moderate aortic valve regurgitation is present.  · Left ventricular diastolic function is consistent with (grade I) impaired relaxation.     Results for orders placed during the hospital encounter of 09/13/19    Cardiac Catheterization/Vascular Study    Narrative  Percutaneous coronary intervention    DATE OF PROCEDURE: 9/16/2019    INDICATION FOR PROCEDURE:  Cardiomyopathy  Severe LV dysfunction  Two-vessel coronary artery disease  Renal failure    PROCEDURE PERFORMED:  Successful PCI mid LAD with placement of a drug-eluting stent  Successful PCI of the mid right coronary artery with placement of a drug-eluting stent  Successful insertion of the Impella left ventricular percutaneous ventricular assist device  Successful removal of the Impella left ventricular percutaneous ventricular assist device  Hemostasis of the left common femoral artery with a Perclose closure device    PROCEDURE COMMENTS:    Informed consent was obtained from the patient after explaining risks and benefits.  Patient was brought to the cardiac interventional artery and placed on the table in the usual fashion.  Right groin was prepped and draped in usual sterile fashion 2% lidocaine was used for localization right femoral artery was accessed using a modified 7 technique with micropuncture needle with a 6 Cymro arterial sheath.  Left femoral artery was accessed using micropuncture and placed 2 Perclose closure devices before the start of the procedure and a 14 Cymro Impella sheath was placed after dilating the tract.  We  inserted an Impella CP left ventricular percutaneous ventricular assist device under fluoroscopic guidance with a good waveforms and good cardiac output before the start of the procedure.  And this device was removed at the end of the procedure and hemostasis was achieved using 2 Perclose closure devices with good hemostasis.  For the interventional procedure on the mid LAD lesion we used a XB 3.5 guide catheter with a whisper guidewire to cross the lesion in the midportion of the LAD and the lesion is directly stented with a 3.0 x 18 mm Xience drug-eluting stent that was deployed at 14 krysta with good angiographic results.  There was a FERCHO-3 flow in the diagonal branch with no significant compromise on the flow after the interventional procedure.  For the interventional procedure on the right coronary artery we used a centimeters whisper guidewire with a JR4 guide catheter and the lesion is directly stented with a 3.5 x 18 mm Xience drug-eluting stent that was deployed at 18 krysta with good angiographic results.  Patient tolerated procedure well with no immediate postop completion plan to obtain hemostasis by manual pressure on the right side.    FINDINGS:      1. CORONARY ANGIOGRAPHY:    LAD: Mid 90% stenosis diagonal bifurcation  FERCHO-3 flow before and after the intervention in the LAD and also in the diagonal branch  Lesion length is 10 mm  Stented with a 3.0 x 18 mm Xience drug-eluting stent  Percent stenosis before the procedure 90%  Percent stenosis after the procedure 0%  FERCHO-3 flow before and after the procedure  No complications during or immediately after the procedure    RCA: 70% stenosis in the mid right coronary artery  FERCHO-3 flow before and after the intervention  Lesion length is 15 mm  Stented with a 3.5 x 18 mm Xience drug-eluting stent  Percent stenosis after the procedure 0%  No complications during or immediately after the procedure  Coronary dominance: Right coronary  artery    SUMMARY:  Successful PCI mid LAD with placement of a drug-eluting stent  Successful PCI of the mid right coronary artery with placement of a drug-eluting stent  Successful insertion of the Impella left ventricular percutaneous ventricular assist device  Successful removal of the Impella left ventricular percutaneous ventricular assist device  Hemostasis of the left common femoral artery with a Perclose closure device    RECOMMENDATIONS:  Aspirin 81 mg p.o. once a day  Plavix 75 mg p.o. once a day  Observe patient in CVCU bed overnight  Continued aggressive risk factor modification  Medical therapy for cardiomyopathy  Test results discussed with the patient and family     Lab Results   Component Value Date    CHOL 114 08/30/2024    TRIG 118 08/30/2024    HDL 39 (L) 08/30/2024    LDL 54 08/30/2024       Results for orders placed during the hospital encounter of 12/01/22    Mobile Cardiac Outpatient Telemetry    Interpretation Summary  Extended Holter monitor study  Patient was monitored from December 1, 2022 to December 30, 2022  Underlying rhythm is sinus rhythm with a minimal heart rate of 37 bpm maximal heart rate of 120 bpm average heart rate of 64 bpm  No atrial fibrillation  No sustained ventricular or supraventricular tachyarrhythmia  First-degree AV block  PVC burden of 1%  PAC burden of less than 1%  Minimal heart rate of 37 bpm was sinus bradycardia with first-degree AV block and intraventricular conduction delay  Clinical correlation is recommended           Objective:          Allergies:  No Known Allergies    Medication Review:     Current Outpatient Medications:     aspirin (aspirin) 81 MG EC tablet, Take 1 tablet by mouth Daily., Disp: 30 tablet, Rfl: 0    atorvastatin (LIPITOR) 40 MG tablet, TAKE 1 TABLET BY MOUTH AT BEDTIME, Disp: 90 tablet, Rfl: 2    clopidogrel (PLAVIX) 75 MG tablet, TAKE 1 TABLET BY MOUTH EVERY DAY, Disp: 90 tablet, Rfl: 2    empagliflozin (Jardiance) 10 MG tablet tablet,  Take 1 tablet by mouth Daily., Disp: 90 tablet, Rfl: 3    losartan (COZAAR) 100 MG tablet, TAKE 1 TABLET BY MOUTH EVERY DAY, Disp: 90 tablet, Rfl: 1    metoprolol succinate XL (TOPROL-XL) 25 MG 24 hr tablet, TAKE 1 TABLET BY MOUTH EVERY DAY, Disp: 90 tablet, Rfl: 2    nitroglycerin (NITROSTAT) 0.4 MG SL tablet, 1 under the tongue as needed for angina, may repeat q5mins for up three doses, Disp: 30 tablet, Rfl: 2    spironolactone (ALDACTONE) 25 MG tablet, Take 1 tablet by mouth Daily., Disp: 90 tablet, Rfl: 3    Family History:  Family History   Problem Relation Age of Onset    Cancer Mother     Heart attack Father        Past Medical History:  Past Medical History:   Diagnosis Date    Aortic insufficiency     Moderate    Atrial fibrillation august 2019    CAD (coronary artery disease)     Cardiomyopathy     e.f 20%    CHF (congestive heart failure) August 2019    Hyperlipidemia     Hypertension     Left atrial enlargement        Past surgical History:  Past Surgical History:   Procedure Laterality Date    CARDIAC CATHETERIZATION N/A 09/13/2019    Procedure: Left Heart Cath and right heart cath;  Surgeon: Griselda Kowalski MD;  Location: [facility] CATH INVASIVE LOCATION;  Service: Cardiovascular    CARDIAC CATHETERIZATION N/A 09/16/2019    Procedure: PCI to LAD;  Surgeon: Griselda Kowalski MD;  Location: [facility] CATH INVASIVE LOCATION;  Service: Cardiovascular    CARDIAC ELECTROPHYSIOLOGY PROCEDURE  09/16/2019    Procedure: Impella Insertion;  Surgeon: Griselda Kowalski MD;  Location: [facility] CATH INVASIVE LOCATION;  Service: Cardiovascular    CARDIAC ELECTROPHYSIOLOGY PROCEDURE N/A 09/16/2019    Procedure: Impella Removal;  Surgeon: Griselda Kowalski MD;  Location: [facility] CATH INVASIVE LOCATION;  Service: Cardiovascular    CORONARY ANGIOPLASTY WITH STENT PLACEMENT      RCA and LAD    AL RT/LT HEART CATHETERS N/A 09/16/2019    Procedure: Percutaneous Coronary Intervention;  Surgeon: Dex  Griselda SCHROEDER MD;  Location: [facility] INVASIVE LOCATION;  Service: Cardiovascular       Social History:  Social History     Socioeconomic History    Marital status:    Tobacco Use    Smoking status: Never     Passive exposure: Never    Smokeless tobacco: Never   Vaping Use    Vaping status: Never Used   Substance and Sexual Activity    Alcohol use: No    Drug use: No    Sexual activity: Yes     Partners: Female       Review of Systems:  The following systems were reviewed as they relate to the cardiovascular system: Constitutional, Eyes, ENT, Cardiovascular, Respiratory, Gastrointestinal, Integumentary, Neurological, Psychiatric, Hematologic, Endocrine, Musculoskeletal, and Genitourinary. The pertinent cardiovascular findings are reported above with all other cardiovascular points within those systems being negative.    Diagnostic Study Review:     Current Electrocardiogram:    ECG 12 Lead    Date/Time: 9/4/2024 9:00 AM  Performed by: Griselda Kowalski MD    Authorized by: Griselda Kowalski MD  Comparison: compared with previous ECG   Similar to previous ECG  Rhythm: sinus rhythm  Rate: normal  BPM: 58  Conduction: left bundle branch block  QRS axis: normal    Clinical impression: abnormal EKG                NOTE: The following portions of the patient's history were reviewed and updated this visit as appropriate: allergies, current medications, past family history, past medical history, past social history, past surgical history and problem list.

## 2024-09-09 RX ORDER — METOPROLOL SUCCINATE 25 MG/1
25 TABLET, EXTENDED RELEASE ORAL DAILY
Qty: 90 TABLET | Refills: 1 | Status: SHIPPED | OUTPATIENT
Start: 2024-09-09

## 2024-11-25 RX ORDER — LOSARTAN POTASSIUM 100 MG/1
TABLET ORAL
Qty: 90 TABLET | Refills: 2 | Status: SHIPPED | OUTPATIENT
Start: 2024-11-25

## 2025-03-05 RX ORDER — METOPROLOL SUCCINATE 25 MG/1
25 TABLET, EXTENDED RELEASE ORAL DAILY
Qty: 90 TABLET | Refills: 0 | Status: SHIPPED | OUTPATIENT
Start: 2025-03-05

## 2025-03-06 ENCOUNTER — OFFICE VISIT (OUTPATIENT)
Dept: CARDIOLOGY | Facility: CLINIC | Age: 70
End: 2025-03-06
Payer: COMMERCIAL

## 2025-03-06 VITALS
OXYGEN SATURATION: 97 % | BODY MASS INDEX: 30.46 KG/M2 | HEART RATE: 64 BPM | HEIGHT: 70 IN | DIASTOLIC BLOOD PRESSURE: 82 MMHG | WEIGHT: 212.8 LBS | SYSTOLIC BLOOD PRESSURE: 143 MMHG

## 2025-03-06 DIAGNOSIS — I42.0 CARDIOMYOPATHY, DILATED: Primary | ICD-10-CM

## 2025-03-06 DIAGNOSIS — E78.2 MIXED HYPERLIPIDEMIA: ICD-10-CM

## 2025-03-06 DIAGNOSIS — E78.5 HYPERLIPIDEMIA LDL GOAL <100: ICD-10-CM

## 2025-03-06 DIAGNOSIS — I42.0 DILATED CARDIOMYOPATHY: ICD-10-CM

## 2025-03-06 DIAGNOSIS — I25.119 CORONARY ARTERY DISEASE INVOLVING NATIVE CORONARY ARTERY OF NATIVE HEART WITH ANGINA PECTORIS: ICD-10-CM

## 2025-03-06 DIAGNOSIS — I10 ESSENTIAL HYPERTENSION: ICD-10-CM

## 2025-03-06 NOTE — PROGRESS NOTES
Cardiology Office Visit      Encounter Date:  03/06/2025    Patient ID:   Devaughn Valdez is a 69 y.o. male.    Reason For Followup:  Cardiomyopathy  Coronary artery disease status post PCI and stenting  Hypertension  Hyperlipidemia    Brief Clinical History:  Dear Zev Polk MD    I had the pleasure of seeing Devaughn Valdez today. As you are well aware, this is a 69 y.o. male  that was recently diagnosed with hypertension and also cardiomegaly and severe cardiomyopathy  Patient has severe cardiomyopathy on echocardiogram with LV ejection fraction of 20%  Moderate to severe mitral regurgitation  PROCEDURE PERFORMED: September 2019  Successful PCI mid LAD with placement of a drug-eluting stent  Successful PCI of the mid right coronary artery with placement of a drug-eluting stent        Interval History:  Patient denies any symptoms of chest pain shortness of breath dizziness or syncope  Denies any new complaints  Compliant with medical therapy  Denies any new complaints  Assessment & Plan    Impressions:  Severe cardiomyopathy with improvement of the LV systolic function and ejection fraction from 20% to 35%/most recent echocardiogram with LV ejection fraction of 40%/most recent cardiac MRI with LV ejection fraction of 50%  Recovery of LV systolic function with most recent cardiac MRI showing LV ejection fraction of 50% and no significant valve pathology  Coronary artery disease status post PCI and stenting  Hypertension  Hyperlipidemia  Moderate mitral regurgitation/resolved  Moderate aortic regurgitation/resolved  Cardiac MRI with LV ejection fraction of 52%  [place] hypertension  Left bundle branch block    Recommendations:  Patient unable to afford Entresto secondary to co-pay issues  Continue statin beta-blocker and spironolactone  Continue dual antiplatelet therapy with aspirin and Plavix  Continue current medical therapy with aspirin 81 mg p.o. once a day Plavix 75 mg p.o. once a day Lipitor 40 mg p.o.  "once a day Jardiance 10 mg p.o. once a day losartan 50 mg p.o. once a day Toprol-XL 25 mg p.o. once a day Aldactone 25 mg p.o. once a day  Continue close monitoring  Patient is advised to find a primary care physician  Recent labs and workup reviewed and discussed with patient  Home blood pressure readings are optimal  Lipids and recent labs reviewed and discussed with patient and lipids are optimal  Prior workup labs medications reviewed and discussed with patient  Patient is advised to find a primary care provider for further evaluation and treatment options for regular medical problems and health maintenance and also for cancer screening  Need for close monitoring and follow-up reviewed and discussed with patient  Check labs including CBC CMP lipids and thyroid profile  Repeat echocardiogram before next office visit for further evaluation and treatment options  Prior workup labs medications reviewed and discussed patient  Continue current medical therapy  Continue aggressive risk factor modification  Need for regular exercise and weight loss reviewed and discussed with patient  Follow-up in office in 6 months        Vitals:  Vitals:    03/06/25 0821   BP: 143/82   Pulse: 64   SpO2: 97%   Weight: 96.5 kg (212 lb 12.8 oz)   Height: 177.8 cm (70\")       Physical Exam:    General: Alert, cooperative, no distress, appears stated age  Head:  Normocephalic, atraumatic, mucous membranes moist  Eyes:  Conjunctiva/corneas clear, EOM's intact     Neck:  Supple,  no adenopathy;      Lungs: Clear to auscultation bilaterally, no wheezes rhonchi rales are noted  Chest wall: No tenderness  Heart::  Regular rate and rhythm, S1 and S2 normal, no murmur, rub or gallop  Abdomen: Soft, non-tender, nondistended bowel sounds active  Extremities: No cyanosis, clubbing, or edema  Pulses: 2+ and symmetric all extremities  Skin:  No rashes or lesions  Neuro/psych: A&O x3. CN II through XII are grossly intact with appropriate " affect              Lab Results   Component Value Date    GLUCOSE 87 08/30/2024    BUN 16 08/30/2024    CREATININE 1.11 08/30/2024    EGFR 72.8 08/30/2024    BCR 14.4 08/30/2024    K 4.4 08/30/2024    CO2 25.5 08/30/2024    CALCIUM 9.4 08/30/2024    ALBUMIN 4.1 08/30/2024    BILITOT 0.8 08/30/2024    AST 21 08/30/2024    ALT 19 08/30/2024     Results for orders placed during the hospital encounter of 09/13/22    Adult Transthoracic Echo Complete w/ Color, Spectral and Contrast if necessary per protocol    Interpretation Summary  · The left ventricular cavity is mildly dilated.  · Left ventricular wall thickness is consistent with moderate concentric hypertrophy.  · Estimated left ventricular EF = 40% Estimated left ventricular EF was in agreement with the calculated left ventricular EF. Left ventricular systolic function is mildly decreased.  · The right ventricular cavity is mildly dilated.  · Mildly reduced right ventricular systolic function noted.  · Moderate mitral valve regurgitation is present.  · Mild dilation of the aortic root is present.  · Estimated right ventricular systolic pressure from tricuspid regurgitation is normal (<35 mmHg).  · Moderate aortic valve regurgitation is present.  · Left ventricular diastolic function is consistent with (grade I) impaired relaxation.     Results for orders placed during the hospital encounter of 09/13/19    Cardiac Catheterization/Vascular Study    Narrative  Percutaneous coronary intervention    DATE OF PROCEDURE: 9/16/2019    INDICATION FOR PROCEDURE:  Cardiomyopathy  Severe LV dysfunction  Two-vessel coronary artery disease  Renal failure    PROCEDURE PERFORMED:  Successful PCI mid LAD with placement of a drug-eluting stent  Successful PCI of the mid right coronary artery with placement of a drug-eluting stent  Successful insertion of the Impella left ventricular percutaneous ventricular assist device  Successful removal of the Impella left ventricular  percutaneous ventricular assist device  Hemostasis of the left common femoral artery with a Perclose closure device    PROCEDURE COMMENTS:    Informed consent was obtained from the patient after explaining risks and benefits.  Patient was brought to the cardiac interventional artery and placed on the table in the usual fashion.  Right groin was prepped and draped in usual sterile fashion 2% lidocaine was used for localization right femoral artery was accessed using a modified 7 technique with micropuncture needle with a 6 Icelandic arterial sheath.  Left femoral artery was accessed using micropuncture and placed 2 Perclose closure devices before the start of the procedure and a 14 [place]ic Impella sheath was placed after dilating the tract.  We inserted an Impella CP left ventricular percutaneous ventricular assist device under fluoroscopic guidance with a good waveforms and good cardiac output before the start of the procedure.  And this device was removed at the end of the procedure and hemostasis was achieved using 2 Perclose closure devices with good hemostasis.  For the interventional procedure on the mid LAD lesion we used a XB 3.5 guide catheter with a whisper guidewire to cross the lesion in the midportion of the LAD and the lesion is directly stented with a 3.0 x 18 mm Xience drug-eluting stent that was deployed at 14 krysta with good angiographic results.  There was a FERCHO-3 flow in the diagonal branch with no significant compromise on the flow after the interventional procedure.  For the interventional procedure on the right coronary artery we used a centimeters whisper guidewire with a JR4 guide catheter and the lesion is directly stented with a 3.5 x 18 mm Xience drug-eluting stent that was deployed at 18 krysta with good angiographic results.  Patient tolerated procedure well with no immediate postop completion plan to obtain hemostasis by manual pressure on the right side.    FINDINGS:      1. CORONARY  ANGIOGRAPHY:    LAD: Mid 90% stenosis diagonal bifurcation  FERCHO-3 flow before and after the intervention in the LAD and also in the diagonal branch  Lesion length is 10 mm  Stented with a 3.0 x 18 mm Xience drug-eluting stent  Percent stenosis before the procedure 90%  Percent stenosis after the procedure 0%  FERCHO-3 flow before and after the procedure  No complications during or immediately after the procedure    RCA: 70% stenosis in the mid right coronary artery  FERCHO-3 flow before and after the intervention  Lesion length is 15 mm  Stented with a 3.5 x 18 mm Xience drug-eluting stent  Percent stenosis after the procedure 0%  No complications during or immediately after the procedure  Coronary dominance: Right coronary artery    SUMMARY:  Successful PCI mid LAD with placement of a drug-eluting stent  Successful PCI of the mid right coronary artery with placement of a drug-eluting stent  Successful insertion of the Impella left ventricular percutaneous ventricular assist device  Successful removal of the Impella left ventricular percutaneous ventricular assist device  Hemostasis of the left common femoral artery with a Perclose closure device    RECOMMENDATIONS:  Aspirin 81 mg p.o. once a day  Plavix 75 mg p.o. once a day  Observe patient in CVCU bed overnight  Continued aggressive risk factor modification  Medical therapy for cardiomyopathy  Test results discussed with the patient and family     Lab Results   Component Value Date    CHOL 114 08/30/2024    TRIG 118 08/30/2024    HDL 39 (L) 08/30/2024    LDL 54 08/30/2024       Results for orders placed during the hospital encounter of 12/01/22    Mobile Cardiac Outpatient Telemetry    Interpretation Summary  Extended Holter monitor study  Patient was monitored from December 1, 2022 to December 30, 2022  Underlying rhythm is sinus rhythm with a minimal heart rate of 37 bpm maximal heart rate of 120 bpm average heart rate of 64 bpm  No atrial fibrillation  No  sustained ventricular or supraventricular tachyarrhythmia  First-degree AV block  PVC burden of 1%  PAC burden of less than 1%  Minimal heart rate of 37 bpm was sinus bradycardia with first-degree AV block and intraventricular conduction delay  Clinical correlation is recommended           Objective:          Allergies:  No Known Allergies    Medication Review:     Current Outpatient Medications:     aspirin (aspirin) 81 MG EC tablet, Take 1 tablet by mouth Daily., Disp: 30 tablet, Rfl: 0    atorvastatin (LIPITOR) 40 MG tablet, TAKE 1 TABLET BY MOUTH AT BEDTIME, Disp: 90 tablet, Rfl: 2    clopidogrel (PLAVIX) 75 MG tablet, TAKE 1 TABLET BY MOUTH EVERY DAY, Disp: 90 tablet, Rfl: 2    empagliflozin (Jardiance) 10 MG tablet tablet, Take 1 tablet by mouth Daily., Disp: 90 tablet, Rfl: 3    losartan (COZAAR) 100 MG tablet, TAKE 1 TABLET BY MOUTH EVERY DAY, Disp: 90 tablet, Rfl: 2    metoprolol succinate XL (TOPROL-XL) 25 MG 24 hr tablet, TAKE 1 TABLET BY MOUTH EVERY DAY, Disp: 90 tablet, Rfl: 0    nitroglycerin (NITROSTAT) 0.4 MG SL tablet, 1 under the tongue as needed for angina, may repeat q5mins for up three doses, Disp: 30 tablet, Rfl: 2    spironolactone (ALDACTONE) 25 MG tablet, Take 1 tablet by mouth Daily., Disp: 90 tablet, Rfl: 3    Family History:  Family History   Problem Relation Age of Onset    Cancer Mother     Heart attack Father        Past Medical History:  Past Medical History:   Diagnosis Date    Aortic insufficiency     Moderate    Atrial fibrillation august 2019    CAD (coronary artery disease)     Cardiomyopathy     e.f 20%    CHF (congestive heart failure) August 2019    Hyperlipidemia     Hypertension     Left atrial enlargement        Past surgical History:  Past Surgical History:   Procedure Laterality Date    CARDIAC CATHETERIZATION N/A 09/13/2019    Procedure: Left Heart Cath and right heart cath;  Surgeon: Griselda Kowalski MD;  Location: [facility] CATH INVASIVE LOCATION;  Service:  Cardiovascular    CARDIAC CATHETERIZATION N/A 09/16/2019    Procedure: PCI to LAD;  Surgeon: Griselda Kowalski MD;  Location: [facility] CATH INVASIVE LOCATION;  Service: Cardiovascular    CARDIAC ELECTROPHYSIOLOGY PROCEDURE  09/16/2019    Procedure: Impella Insertion;  Surgeon: Griselda Kowalski MD;  Location: [facility] CATH INVASIVE LOCATION;  Service: Cardiovascular    CARDIAC ELECTROPHYSIOLOGY PROCEDURE N/A 09/16/2019    Procedure: Impella Removal;  Surgeon: rGiselda Kowalski MD;  Location: [facility] CATH INVASIVE LOCATION;  Service: Cardiovascular    CORONARY ANGIOPLASTY WITH STENT PLACEMENT      RCA and LAD    IL RT/LT HEART CATHETERS N/A 09/16/2019    Procedure: Percutaneous Coronary Intervention;  Surgeon: Griselda Kowalski MD;  Location: [facility] CATH INVASIVE LOCATION;  Service: Cardiovascular       Social History:  Social History     Socioeconomic History    Marital status:    Tobacco Use    Smoking status: Never     Passive exposure: Never    Smokeless tobacco: Never   Vaping Use    Vaping status: Never Used   Substance and Sexual Activity    Alcohol use: No    Drug use: No    Sexual activity: Yes     Partners: Female       Review of Systems:  The following systems were reviewed as they relate to the cardiovascular system: Constitutional, Eyes, ENT, Cardiovascular, Respiratory, Gastrointestinal, Integumentary, Neurological, Psychiatric, Hematologic, Endocrine, Musculoskeletal, and Genitourinary. The pertinent cardiovascular findings are reported above with all other cardiovascular points within those systems being negative.    Diagnostic Study Review:     Current Electrocardiogram:    ECG 12 Lead    Date/Time: 3/6/2025 8:36 AM  Performed by: Griselda Kowalski MD    Authorized by: Griselda Kowalski MD  Comparison: compared with previous ECG   Similar to previous ECG  Rhythm: sinus rhythm  Rate: normal  BPM: 64  Conduction: left bundle branch block  QRS axis:  normal    Clinical impression: abnormal EKG                NOTE: The following portions of the patient's history were reviewed and updated this visit as appropriate: allergies, current medications, past family history, past medical history, past social history, past surgical history and problem list.

## 2025-03-10 RX ORDER — EMPAGLIFLOZIN 10 MG/1
10 TABLET, FILM COATED ORAL DAILY
Qty: 90 TABLET | Refills: 1 | Status: SHIPPED | OUTPATIENT
Start: 2025-03-10

## 2025-03-24 RX ORDER — SPIRONOLACTONE 25 MG/1
25 TABLET ORAL DAILY
Qty: 90 TABLET | Refills: 0 | Status: SHIPPED | OUTPATIENT
Start: 2025-03-24

## 2025-05-13 RX ORDER — ATORVASTATIN CALCIUM 40 MG/1
40 TABLET, FILM COATED ORAL
Qty: 90 TABLET | Refills: 2 | Status: SHIPPED | OUTPATIENT
Start: 2025-05-13

## 2025-05-13 RX ORDER — CLOPIDOGREL BISULFATE 75 MG/1
75 TABLET ORAL DAILY
Qty: 90 TABLET | Refills: 2 | Status: SHIPPED | OUTPATIENT
Start: 2025-05-13

## 2025-06-04 RX ORDER — METOPROLOL SUCCINATE 25 MG/1
25 TABLET, EXTENDED RELEASE ORAL DAILY
Qty: 90 TABLET | Refills: 0 | Status: SHIPPED | OUTPATIENT
Start: 2025-06-04

## 2025-06-23 RX ORDER — SPIRONOLACTONE 25 MG/1
25 TABLET ORAL DAILY
Qty: 90 TABLET | Refills: 0 | Status: SHIPPED | OUTPATIENT
Start: 2025-06-23

## 2025-08-20 RX ORDER — LOSARTAN POTASSIUM 100 MG/1
100 TABLET ORAL DAILY
Qty: 90 TABLET | Refills: 0 | Status: SHIPPED | OUTPATIENT
Start: 2025-08-20

## 2025-08-29 RX ORDER — METOPROLOL SUCCINATE 25 MG/1
25 TABLET, EXTENDED RELEASE ORAL DAILY
Qty: 90 TABLET | Refills: 0 | Status: SHIPPED | OUTPATIENT
Start: 2025-08-29

## (undated) DEVICE — RADIFOCUS OBTURATOR: Brand: RADIFOCUS

## (undated) DEVICE — CATH DIAG IMPULSE FL4 6F 100CM

## (undated) DEVICE — SWAN-GANZ TRUE SIZE THERMODILUTION "S" TIP: Brand: SWAN-GANZ TRUE SIZE

## (undated) DEVICE — PERCLOSE PROGLIDE™ SUTURE-MEDIATED CLOSURE SYSTEM: Brand: PERCLOSE PROGLIDE™

## (undated) DEVICE — KT CATH CAD SUP IMPELLA/CP 9/14F 5L

## (undated) DEVICE — PAD HEMOST NEPTUNE 2X2IN

## (undated) DEVICE — HI-TORQUE WHISPER MS GUIDE WIRE .014 STRAIGHT TIP 3.0 CM X 190 CM: Brand: HI-TORQUE WHISPER

## (undated) DEVICE — CATH DIAG IMPULSE FR4 6F 100CM

## (undated) DEVICE — PINNACLE INTRODUCER SHEATH: Brand: PINNACLE

## (undated) DEVICE — GUIDE CATHETER: Brand: MACH1™

## (undated) DEVICE — GW PTFE EMERALD HEPCOAT FC J TIP STD .035 3MM 150CM

## (undated) DEVICE — PK TRY HEART CATH 50

## (undated) DEVICE — ST ACC MICROPUNCTURE STFF/CANN PLAT/TP 4F 21G 40CM

## (undated) DEVICE — 6F .070 XB 3.5 100CM: Brand: VISTA BRITE TIP

## (undated) DEVICE — DEV INFL COMPAK W/ACCESSPLUS IN4530

## (undated) DEVICE — KT DYEVERT PLS EZ W/SMART SYR FOR HI VISC CONTRST DISP

## (undated) DEVICE — CATH DIAG IMPULSE PIG .056 6F 110CM

## (undated) DEVICE — ELECTRD DEFIB M/FUNC PROPADZ RADIOL 2PK